# Patient Record
Sex: MALE | Race: WHITE | Employment: FULL TIME | URBAN - METROPOLITAN AREA
[De-identification: names, ages, dates, MRNs, and addresses within clinical notes are randomized per-mention and may not be internally consistent; named-entity substitution may affect disease eponyms.]

---

## 2017-01-05 ENCOUNTER — TELEPHONE (OUTPATIENT)
Dept: FAMILY MEDICINE CLINIC | Facility: CLINIC | Age: 62
End: 2017-01-05

## 2017-01-09 NOTE — TELEPHONE ENCOUNTER
JOSE, please schedule nurse visit for Hep B #2 on or after 1/21/17. Pt will be due for Hep A #2 on or after 6/21/17, when he's due for Hep B #3. Thanks.

## 2017-01-21 ENCOUNTER — NURSE ONLY (OUTPATIENT)
Dept: FAMILY MEDICINE CLINIC | Facility: CLINIC | Age: 62
End: 2017-01-21

## 2017-01-21 DIAGNOSIS — Z23 NEED FOR HEPATITIS B VACCINATION: Primary | ICD-10-CM

## 2017-01-21 PROCEDURE — 90471 IMMUNIZATION ADMIN: CPT | Performed by: FAMILY MEDICINE

## 2017-01-21 PROCEDURE — 90746 HEPB VACCINE 3 DOSE ADULT IM: CPT | Performed by: FAMILY MEDICINE

## 2017-01-21 NOTE — PROGRESS NOTES
Pt is here for Hep B vaccine #2. Injection given, pt tolerated well. Pt advised to return for Hep B #3 and Hep B #2 after 6/21/17. Pt voiced understanding.

## 2017-02-23 ENCOUNTER — TELEPHONE (OUTPATIENT)
Dept: FAMILY MEDICINE CLINIC | Facility: CLINIC | Age: 62
End: 2017-02-23

## 2017-02-23 ENCOUNTER — OFFICE VISIT (OUTPATIENT)
Dept: FAMILY MEDICINE CLINIC | Facility: CLINIC | Age: 62
End: 2017-02-23

## 2017-02-23 VITALS
RESPIRATION RATE: 18 BRPM | TEMPERATURE: 98 F | WEIGHT: 284 LBS | HEART RATE: 76 BPM | SYSTOLIC BLOOD PRESSURE: 145 MMHG | BODY MASS INDEX: 43 KG/M2 | DIASTOLIC BLOOD PRESSURE: 87 MMHG

## 2017-02-23 DIAGNOSIS — I26.99 OTHER PULMONARY EMBOLISM WITHOUT ACUTE COR PULMONALE, UNSPECIFIED CHRONICITY (HCC): Primary | ICD-10-CM

## 2017-02-23 PROCEDURE — 99214 OFFICE O/P EST MOD 30 MIN: CPT | Performed by: FAMILY MEDICINE

## 2017-02-23 PROCEDURE — 99212 OFFICE O/P EST SF 10 MIN: CPT | Performed by: FAMILY MEDICINE

## 2017-02-23 RX ORDER — APIXABAN 5 MG/1
TABLET, FILM COATED ORAL
Refills: 0 | COMMUNITY
Start: 2017-02-15 | End: 2017-08-07

## 2017-02-23 NOTE — H&P
Mihai Norwood is a 64year old male with pmh of DMII, HTN, DID, depressive d/o, ADHD, BPH,who presents for f/u following hospitalization for DVT and small PE. Traveled to [facility] at end of January.  While there right calf selling and redness along with a grad mg by mouth daily. Disp:  Rfl:    Cholecalciferol (VITAMIN D3) 33222 UNITS Oral Cap Take by mouth. Disp:  Rfl:    tamsulosin HCl 0.4 MG Oral Cap Take by mouth daily.  Disp:  Rfl:    hydrochlorothiazide 25 MG Oral Tab Take 1 tablet (25 mg total) by mouth ignacio diarrhea and vomiting  Genitourinary:  Negative for dysuria and hematuria  Hema/Lymph:  Negative for easy bleeding and easy bruising since starting eliquis. Musculoskeletal:  Negative for bone/joint symptoms.  See HPI  Neurological:  Negative for gait dist

## 2017-02-23 NOTE — TELEPHONE ENCOUNTER
Pt was just discharged from [facility] fro right lower extremity DVT and PE. Needs follow-up with pulmonology. Still having some shortness of breath. Can we try to get him an appt in the next week or so?   He was due to have an appt 2 weeks after disc

## 2017-02-24 ENCOUNTER — TELEPHONE (OUTPATIENT)
Dept: PULMONOLOGY | Facility: CLINIC | Age: 62
End: 2017-02-24

## 2017-02-24 NOTE — TELEPHONE ENCOUNTER
Called scheduling, and the first available appt with pulmonology 3/9/17. Request will be forwarded to pulmo clinical staff for earlier appt.

## 2017-02-24 NOTE — TELEPHONE ENCOUNTER
Dr Sally Leyva office called to schedule appointment for this patient within the next week.   Patient was in [facility] with a blood clot and she would like patient seen by a Pulmonologist within the next week -  RN states Dr Yelena Medina sent communication to Mindi

## 2017-02-27 NOTE — TELEPHONE ENCOUNTER
Nai Narayan he was hospitalized @ [facility] for blood clot in R leg & then blood clot in lung found. He stts he hasn't seen any of our pulmonary physicians. Pt sched w/ AR for 3/6 @ 12 pm at [facility]. He was given appt time, location, & parking.  Pt verbal

## 2017-03-01 ENCOUNTER — TELEPHONE (OUTPATIENT)
Dept: FAMILY MEDICINE CLINIC | Facility: CLINIC | Age: 62
End: 2017-03-01

## 2017-03-02 RX ORDER — METOPROLOL SUCCINATE 50 MG/1
50 TABLET, EXTENDED RELEASE ORAL
Qty: 90 TABLET | Refills: 0 | Status: SHIPPED | OUTPATIENT
Start: 2017-03-02 | End: 2017-06-12

## 2017-03-02 NOTE — TELEPHONE ENCOUNTER
Current Outpatient Prescriptions:  Metoprolol Succinate ER 50 MG Oral Tablet 24 Hr Take 1 tablet (50 mg total) by mouth once daily.  Disp: 90 tablet Rfl: PT NEEDS REFILLS

## 2017-03-02 NOTE — TELEPHONE ENCOUNTER
Requesting Metoprolol Succinate ER refill    Hypertensive Medications  Protocol Criteria:  · Appointment scheduled in the past 6 months or in the next 3 months  · BMP or CMP in the past 12 months  · Creatinine result < 2  Recent Visits       Provider Depar

## 2017-03-22 ENCOUNTER — OFFICE VISIT (OUTPATIENT)
Dept: PULMONOLOGY | Facility: CLINIC | Age: 62
End: 2017-03-22

## 2017-03-22 VITALS
WEIGHT: 281 LBS | OXYGEN SATURATION: 98 % | HEIGHT: 68 IN | SYSTOLIC BLOOD PRESSURE: 150 MMHG | RESPIRATION RATE: 18 BRPM | BODY MASS INDEX: 42.59 KG/M2 | HEART RATE: 70 BPM | DIASTOLIC BLOOD PRESSURE: 76 MMHG

## 2017-03-22 DIAGNOSIS — I26.99 OTHER ACUTE PULMONARY EMBOLISM WITHOUT ACUTE COR PULMONALE (HCC): Primary | ICD-10-CM

## 2017-03-22 DIAGNOSIS — E66.01 MORBID OBESITY DUE TO EXCESS CALORIES (HCC): ICD-10-CM

## 2017-03-22 PROCEDURE — 99212 OFFICE O/P EST SF 10 MIN: CPT | Performed by: INTERNAL MEDICINE

## 2017-03-22 PROCEDURE — 99244 OFF/OP CNSLTJ NEW/EST MOD 40: CPT | Performed by: INTERNAL MEDICINE

## 2017-03-22 RX ORDER — OMEPRAZOLE 20 MG/1
20 CAPSULE, DELAYED RELEASE ORAL
COMMUNITY
End: 2018-12-26 | Stop reason: DRUGHIGH

## 2017-03-22 RX ORDER — BUPROPION HYDROCHLORIDE 150 MG/1
TABLET, EXTENDED RELEASE ORAL
Refills: 0 | COMMUNITY
Start: 2017-03-13 | End: 2017-03-22

## 2017-03-22 NOTE — PROGRESS NOTES
[facility], [facility], [facility]    Report of Consultation    María Sauceda Patient Status:  Outpatient    1955 MRN [id number]   Location [address] [place] [address], [address] [place] [address], [facility] Mother    • Hypertension Mother    • Stroke Mother      Per NG CVA   • Clotting Disorder Sister        Social History    Smoking Status: Never Smoker                      Alcohol Use: Yes                Comment: Beer & Wine Occasionally         Current Med tell from the records I have . Plan : At least 6 months and might need one year of anticoagulation and based on hematology evaluation , and  hypercoagulable workup might be need it  for longer time .   Continue anticoagulation with Eliquis  We will r

## 2017-05-05 ENCOUNTER — TELEPHONE (OUTPATIENT)
Dept: FAMILY MEDICINE CLINIC | Facility: CLINIC | Age: 62
End: 2017-05-05

## 2017-05-05 NOTE — TELEPHONE ENCOUNTER
Elva Leslie needs a refill of:    •  MetFORMIN HCl 500 MG Oral Tab, Take 1 tablet (500 mg total) by mouth daily with breakfast., Disp: 90 tablet, Rfl: 1

## 2017-05-05 NOTE — TELEPHONE ENCOUNTER
Requesting Metformin refill    Diabetes Medications  Protocol Criteria:  · Appointment scheduled in the past 6 months or the next 3 months  · A1C < 7.5 in the past 6 months  · Creatinine in the past 12 months  · Creatinine result < 1.5   Recent Visits

## 2017-06-06 RX ORDER — HYDROCHLOROTHIAZIDE 25 MG/1
TABLET ORAL
Qty: 90 TABLET | Refills: 0 | Status: SHIPPED | OUTPATIENT
Start: 2017-06-06 | End: 2017-09-01

## 2017-06-15 RX ORDER — METOPROLOL SUCCINATE 50 MG/1
50 TABLET, EXTENDED RELEASE ORAL
Qty: 90 TABLET | Refills: 0 | Status: SHIPPED | OUTPATIENT
Start: 2017-06-15 | End: 2017-09-09

## 2017-06-15 NOTE — TELEPHONE ENCOUNTER
Chart reviewed. Refills sent per Triage Dept protocol.      Hypertensive Medications  Protocol Criteria:  · Appointment scheduled in the past 6 months or in the next 3 months  · BMP or CMP in the past 12 months  · Creatinine result < 2  Recent Visits

## 2017-08-02 ENCOUNTER — TELEPHONE (OUTPATIENT)
Dept: FAMILY MEDICINE CLINIC | Facility: CLINIC | Age: 62
End: 2017-08-02

## 2017-08-03 ENCOUNTER — TELEPHONE (OUTPATIENT)
Dept: FAMILY MEDICINE CLINIC | Facility: CLINIC | Age: 62
End: 2017-08-03

## 2017-08-04 NOTE — TELEPHONE ENCOUNTER
Pt is scheduled for a diabetic f/u on Monday 08/07. Would Dr. Monse Mendoza like to order any labs prior to this visit?

## 2017-08-07 ENCOUNTER — OFFICE VISIT (OUTPATIENT)
Dept: FAMILY MEDICINE CLINIC | Facility: CLINIC | Age: 62
End: 2017-08-07

## 2017-08-07 VITALS
WEIGHT: 290.19 LBS | TEMPERATURE: 97 F | HEIGHT: 68 IN | OXYGEN SATURATION: 96 % | HEART RATE: 68 BPM | DIASTOLIC BLOOD PRESSURE: 84 MMHG | SYSTOLIC BLOOD PRESSURE: 140 MMHG | BODY MASS INDEX: 43.98 KG/M2

## 2017-08-07 DIAGNOSIS — I82.4Y1 ACUTE DEEP VEIN THROMBOSIS (DVT) OF PROXIMAL VEIN OF RIGHT LOWER EXTREMITY (HCC): ICD-10-CM

## 2017-08-07 DIAGNOSIS — I26.99 OTHER PULMONARY EMBOLISM WITHOUT ACUTE COR PULMONALE, UNSPECIFIED CHRONICITY (HCC): ICD-10-CM

## 2017-08-07 DIAGNOSIS — I10 ESSENTIAL HYPERTENSION: ICD-10-CM

## 2017-08-07 DIAGNOSIS — E11.9 TYPE 2 DIABETES MELLITUS WITHOUT COMPLICATION, WITHOUT LONG-TERM CURRENT USE OF INSULIN (HCC): Primary | ICD-10-CM

## 2017-08-07 PROCEDURE — 99214 OFFICE O/P EST MOD 30 MIN: CPT | Performed by: FAMILY MEDICINE

## 2017-08-07 PROCEDURE — 90471 IMMUNIZATION ADMIN: CPT | Performed by: FAMILY MEDICINE

## 2017-08-07 PROCEDURE — 90632 HEPA VACCINE ADULT IM: CPT | Performed by: FAMILY MEDICINE

## 2017-08-07 PROCEDURE — 90746 HEPB VACCINE 3 DOSE ADULT IM: CPT | Performed by: FAMILY MEDICINE

## 2017-08-07 PROCEDURE — 99212 OFFICE O/P EST SF 10 MIN: CPT | Performed by: FAMILY MEDICINE

## 2017-08-07 PROCEDURE — 90472 IMMUNIZATION ADMIN EACH ADD: CPT | Performed by: FAMILY MEDICINE

## 2017-08-07 RX ORDER — HYDROCHLOROTHIAZIDE 25 MG/1
25 TABLET ORAL
COMMUNITY
End: 2017-08-07

## 2017-08-07 RX ORDER — BUPROPION HYDROCHLORIDE 150 MG/1
150 TABLET, EXTENDED RELEASE ORAL
COMMUNITY
End: 2017-08-07

## 2017-08-07 RX ORDER — ALPRAZOLAM 1 MG/1
1 TABLET ORAL 4 TIMES DAILY
COMMUNITY
End: 2021-07-20

## 2017-08-07 RX ORDER — METOPROLOL SUCCINATE 50 MG/1
50 TABLET, EXTENDED RELEASE ORAL
COMMUNITY
End: 2017-08-07

## 2017-08-07 RX ORDER — TAMSULOSIN HYDROCHLORIDE 0.4 MG/1
0.4 CAPSULE ORAL
COMMUNITY
End: 2017-08-07

## 2017-08-07 RX ORDER — ARMODAFINIL 250 MG/1
250 TABLET ORAL
COMMUNITY
End: 2017-08-07

## 2017-08-07 RX ORDER — FLUOXETINE HYDROCHLORIDE 20 MG/1
20 CAPSULE ORAL
COMMUNITY
End: 2017-08-07

## 2017-08-07 NOTE — PROGRESS NOTES
HPI: Luis Duggan is a 64year old male who presents for diabetes follow-up. Takes Metformin 500mg daily. No side effects. Does not test accuchecks. States he is eating a lot. Last eye exam was 9-12 months ago. Pescatarian. Tries to avoid exercise.   States tamsulosin HCl 0.4 MG Oral Cap Take by mouth daily. Disp:  Rfl:    BuPROPion HCl ER, SR, (WELLBUTRIN SR) 150 MG Oral Tablet 12 Hr  Disp:  Rfl: 0   FLUoxetine HCl (PROZAC) 20 MG Oral Cap  Disp:  Rfl: 0   PSEUDOEPHEDRINE HCL OR Take  by mouth.  take 2 table

## 2017-08-17 ENCOUNTER — HOSPITAL ENCOUNTER (OUTPATIENT)
Dept: ULTRASOUND IMAGING | Facility: HOSPITAL | Age: 62
Discharge: HOME | End: 2017-08-17
Attending: FAMILY MEDICINE
Payer: COMMERCIAL

## 2017-08-17 DIAGNOSIS — I82.4Y1 ACUTE DEEP VEIN THROMBOSIS (DVT) OF PROXIMAL VEIN OF RIGHT LOWER EXTREMITY (HCC): ICD-10-CM

## 2017-08-17 DIAGNOSIS — I26.99 OTHER PULMONARY EMBOLISM WITHOUT ACUTE COR PULMONALE, UNSPECIFIED CHRONICITY (HCC): ICD-10-CM

## 2017-08-17 PROCEDURE — 93971 EXTREMITY STUDY: CPT | Performed by: FAMILY MEDICINE

## 2017-08-25 ENCOUNTER — TELEPHONE (OUTPATIENT)
Dept: FAMILY MEDICINE CLINIC | Facility: CLINIC | Age: 62
End: 2017-08-25

## 2017-08-25 ENCOUNTER — OFFICE VISIT (OUTPATIENT)
Dept: HEMATOLOGY/ONCOLOGY | Facility: HOSPITAL | Age: 62
End: 2017-08-25
Attending: INTERNAL MEDICINE
Payer: COMMERCIAL

## 2017-08-25 ENCOUNTER — TELEPHONE (OUTPATIENT)
Dept: HEMATOLOGY/ONCOLOGY | Facility: HOSPITAL | Age: 62
End: 2017-08-25

## 2017-08-25 VITALS
SYSTOLIC BLOOD PRESSURE: 130 MMHG | TEMPERATURE: 98 F | RESPIRATION RATE: 16 BRPM | DIASTOLIC BLOOD PRESSURE: 73 MMHG | HEART RATE: 73 BPM | WEIGHT: 289 LBS | BODY MASS INDEX: 43.8 KG/M2 | HEIGHT: 68 IN

## 2017-08-25 DIAGNOSIS — I82.4Y1 ACUTE DEEP VEIN THROMBOSIS (DVT) OF PROXIMAL VEIN OF RIGHT LOWER EXTREMITY (HCC): Primary | ICD-10-CM

## 2017-08-25 DIAGNOSIS — E66.9 OBESITY, UNSPECIFIED CLASSIFICATION, UNSPECIFIED OBESITY TYPE, UNSPECIFIED WHETHER SERIOUS COMORBIDITY PRESENT: Primary | ICD-10-CM

## 2017-08-25 DIAGNOSIS — I82.401 DEEP VEIN THROMBOSIS (DVT) OF RIGHT LOWER EXTREMITY, UNSPECIFIED CHRONICITY, UNSPECIFIED VEIN (HCC): ICD-10-CM

## 2017-08-25 DIAGNOSIS — I26.99 OTHER PULMONARY EMBOLISM WITHOUT ACUTE COR PULMONALE, UNSPECIFIED CHRONICITY (HCC): ICD-10-CM

## 2017-08-25 PROCEDURE — 99245 OFF/OP CONSLTJ NEW/EST HI 55: CPT | Performed by: INTERNAL MEDICINE

## 2017-08-25 PROCEDURE — 99212 OFFICE O/P EST SF 10 MIN: CPT | Performed by: INTERNAL MEDICINE

## 2017-08-25 RX ORDER — WARFARIN SODIUM 5 MG/1
5 TABLET ORAL NIGHTLY
Qty: 20 TABLET | Refills: 0 | Status: SHIPPED | OUTPATIENT
Start: 2017-08-25 | End: 2017-09-20 | Stop reason: SDUPTHER

## 2017-08-25 NOTE — TELEPHONE ENCOUNTER
Per Gracie/RN stts that pt need to starts coumadin, so pt needs to start to go and have his PT/INR to do. Pls advise.

## 2017-08-25 NOTE — CONSULTS
[facility]    PATIENT'S NAME: Tucker Mcneillum PHYSICIAN: Jackie Pitts.  Huyen Cole MD   PATIENT ACCOUNT #: [de-identified] LOCATION: [address] [place] [address] RECORD #: [id number] YOB: 1955   CONSULTATION DATE: 08/25/2017       CANCER [facility] thromboembolic disease in the family. REVIEW OF SYSTEMS:  All other systems are reviewed and negative x12. PHYSICAL EXAMINATION:    GENERAL:  No acute distress. Alert and oriented. VITAL SIGNS:  ECOG performance status is 0. Weight is 131 kg. arrange for him to be seen in the Coumadin Clinic. Of note, although he has risk factors with his obesity and sedentary lifestyle, this was likely a provoked episode of venous thromboembolic disease following his travel several weeks before.   If he has

## 2017-08-25 NOTE — TELEPHONE ENCOUNTER
Patient seen today by Dr Olinda Pitts, needs coumadin clinic for coumadin management. I called the coumadin and gave them pt information. They will forward the request to the MD and contact patient.

## 2017-08-28 ENCOUNTER — ANTI-COAG VISIT (OUTPATIENT)
Dept: INTERNAL MEDICINE CLINIC | Facility: CLINIC | Age: 62
End: 2017-08-28

## 2017-08-28 DIAGNOSIS — I82.4Y1 ACUTE DEEP VEIN THROMBOSIS (DVT) OF PROXIMAL VEIN OF RIGHT LOWER EXTREMITY (HCC): ICD-10-CM

## 2017-08-28 NOTE — TELEPHONE ENCOUNTER
S/w pt- is switching from eliquis to warfarin, just started on warfarin yesterday on 8/27. New pt appt made to see at [address] [place] [address] on 8/31. Pt v/u will call Dr. Benson Forrester when to stop on eliquis exactly.

## 2017-08-29 ENCOUNTER — TELEPHONE (OUTPATIENT)
Dept: INTERNAL MEDICINE CLINIC | Facility: CLINIC | Age: 62
End: 2017-08-29

## 2017-08-31 ENCOUNTER — TELEPHONE (OUTPATIENT)
Dept: INTERNAL MEDICINE CLINIC | Facility: CLINIC | Age: 62
End: 2017-08-31

## 2017-08-31 ENCOUNTER — ANTI-COAG VISIT (OUTPATIENT)
Dept: INTERNAL MEDICINE CLINIC | Facility: CLINIC | Age: 62
End: 2017-08-31

## 2017-08-31 DIAGNOSIS — I82.4Y1 ACUTE DEEP VEIN THROMBOSIS (DVT) OF PROXIMAL VEIN OF RIGHT LOWER EXTREMITY (HCC): ICD-10-CM

## 2017-08-31 LAB — INR: 1.9 (ref 2–3)

## 2017-08-31 PROCEDURE — 85610 PROTHROMBIN TIME: CPT

## 2017-08-31 PROCEDURE — 36416 COLLJ CAPILLARY BLOOD SPEC: CPT

## 2017-08-31 NOTE — TELEPHONE ENCOUNTER
I am so sorry for sending this so late in the day did lower pts dose to take warfarin 2.5 mg Mon., Fri., and 5 the other days.   Is coming back next week for INR test.  ashleigh

## 2017-09-01 RX ORDER — HYDROCHLOROTHIAZIDE 25 MG/1
25 TABLET ORAL
Qty: 90 TABLET | Refills: 0 | Status: SHIPPED | OUTPATIENT
Start: 2017-09-01 | End: 2017-12-04

## 2017-09-01 NOTE — TELEPHONE ENCOUNTER
Hypertensive Medications: Refilled per protocol    Protocol Criteria:  · Appointment scheduled in the past 6 months or in the next 3 months  · BMP or CMP in the past 12 months  · Creatinine result < 2  Recent Outpatient Visits            1 week ago Obesity

## 2017-09-05 ENCOUNTER — TELEPHONE (OUTPATIENT)
Dept: HEMATOLOGY/ONCOLOGY | Facility: HOSPITAL | Age: 62
End: 2017-09-05

## 2017-09-05 NOTE — TELEPHONE ENCOUNTER
Sreekanth Estrada called and said the coumadin clinic wanted him to call Dr. Christiano Lee to see if he wants him to continue on the eliquis medication please advise.

## 2017-09-06 ENCOUNTER — ANTI-COAG VISIT (OUTPATIENT)
Dept: INTERNAL MEDICINE CLINIC | Facility: CLINIC | Age: 62
End: 2017-09-06

## 2017-09-06 DIAGNOSIS — I82.4Y1 ACUTE DEEP VEIN THROMBOSIS (DVT) OF PROXIMAL VEIN OF RIGHT LOWER EXTREMITY (HCC): ICD-10-CM

## 2017-09-06 LAB — INR: 2.9 (ref 2–3)

## 2017-09-06 PROCEDURE — 85610 PROTHROMBIN TIME: CPT

## 2017-09-06 PROCEDURE — 36416 COLLJ CAPILLARY BLOOD SPEC: CPT

## 2017-09-06 NOTE — TELEPHONE ENCOUNTER
Hi Dr. Allan Galdamez,    Pt has INR of 2.9 (his goal is 2.0-3.0) today, and he is taking 2.5 mg x2 days, and 5 mg x5 days. Can he stop the eliquis now? Please advise.

## 2017-09-07 ENCOUNTER — TELEPHONE (OUTPATIENT)
Dept: INTERNAL MEDICINE CLINIC | Facility: CLINIC | Age: 62
End: 2017-09-07

## 2017-09-09 RX ORDER — METOPROLOL SUCCINATE 50 MG/1
50 TABLET, EXTENDED RELEASE ORAL
Qty: 90 TABLET | Refills: 0 | Status: SHIPPED | OUTPATIENT
Start: 2017-09-09 | End: 2017-12-05

## 2017-09-09 NOTE — TELEPHONE ENCOUNTER
Chart reviewed. Refills sent per Triage Dept protocol.      Hypertensive Medications  Protocol Criteria:  · Appointment scheduled in the past 6 months or in the next 3 months  · BMP or CMP in the past 12 months  · Creatinine result < 2  Recent Outpatient Vi

## 2017-09-19 RX ORDER — WARFARIN SODIUM 5 MG/1
TABLET ORAL
Qty: 20 TABLET | Refills: 0 | OUTPATIENT
Start: 2017-09-19

## 2017-09-20 ENCOUNTER — TELEPHONE (OUTPATIENT)
Dept: INTERNAL MEDICINE CLINIC | Facility: CLINIC | Age: 62
End: 2017-09-20

## 2017-09-20 ENCOUNTER — ANTI-COAG VISIT (OUTPATIENT)
Dept: INTERNAL MEDICINE CLINIC | Facility: CLINIC | Age: 62
End: 2017-09-20

## 2017-09-20 DIAGNOSIS — I82.4Y1 ACUTE DEEP VEIN THROMBOSIS (DVT) OF PROXIMAL VEIN OF RIGHT LOWER EXTREMITY (HCC): ICD-10-CM

## 2017-09-20 LAB — INR: 1.8 (ref 2–3)

## 2017-09-20 PROCEDURE — 85610 PROTHROMBIN TIME: CPT

## 2017-09-20 PROCEDURE — 36416 COLLJ CAPILLARY BLOOD SPEC: CPT

## 2017-09-20 PROCEDURE — 99211 OFF/OP EST MAY X REQ PHY/QHP: CPT

## 2017-09-20 RX ORDER — WARFARIN SODIUM 5 MG/1
TABLET ORAL
Qty: 30 TABLET | Refills: 3 | Status: SHIPPED | OUTPATIENT
Start: 2017-09-20 | End: 2018-01-10

## 2017-10-05 ENCOUNTER — ANTI-COAG VISIT (OUTPATIENT)
Dept: INTERNAL MEDICINE CLINIC | Facility: CLINIC | Age: 62
End: 2017-10-05

## 2017-10-05 DIAGNOSIS — I82.4Y1 ACUTE DEEP VEIN THROMBOSIS (DVT) OF PROXIMAL VEIN OF RIGHT LOWER EXTREMITY (HCC): ICD-10-CM

## 2017-10-05 PROCEDURE — 36416 COLLJ CAPILLARY BLOOD SPEC: CPT

## 2017-10-05 PROCEDURE — 85610 PROTHROMBIN TIME: CPT

## 2017-10-13 ENCOUNTER — APPOINTMENT (OUTPATIENT)
Dept: LAB | Age: 62
End: 2017-10-13
Attending: FAMILY MEDICINE
Payer: COMMERCIAL

## 2017-10-13 DIAGNOSIS — E11.9 TYPE 2 DIABETES MELLITUS WITHOUT COMPLICATION, WITHOUT LONG-TERM CURRENT USE OF INSULIN (HCC): ICD-10-CM

## 2017-10-13 PROCEDURE — 83036 HEMOGLOBIN GLYCOSYLATED A1C: CPT

## 2017-10-13 PROCEDURE — 36415 COLL VENOUS BLD VENIPUNCTURE: CPT

## 2017-10-28 RX ORDER — ROPINIROLE 0.25 MG/1
0.5 TABLET, FILM COATED ORAL NIGHTLY
Qty: 60 TABLET | Refills: 0 | Status: SHIPPED | OUTPATIENT
Start: 2017-10-28 | End: 2017-11-30

## 2017-10-28 NOTE — TELEPHONE ENCOUNTER
Please advise on refill request.    Refill Protocol Appointment Criteria  · Appointment scheduled in the past 6 months or in the next 3 months  Recent Outpatient Visits            2 months ago Obesity, unspecified classification, unspecified obesity type,

## 2017-11-01 ENCOUNTER — ANTI-COAG VISIT (OUTPATIENT)
Dept: INTERNAL MEDICINE CLINIC | Facility: CLINIC | Age: 62
End: 2017-11-01

## 2017-11-01 DIAGNOSIS — I82.4Y1 ACUTE DEEP VEIN THROMBOSIS (DVT) OF PROXIMAL VEIN OF RIGHT LOWER EXTREMITY (HCC): ICD-10-CM

## 2017-11-01 PROCEDURE — 36416 COLLJ CAPILLARY BLOOD SPEC: CPT

## 2017-11-01 PROCEDURE — 85610 PROTHROMBIN TIME: CPT

## 2017-11-07 NOTE — TELEPHONE ENCOUNTER
Signed Prescriptions Disp Refills    MetFORMIN HCl 500 MG Oral Tab 90 tablet 2      Sig: Take 1 tablet (500 mg total) by mouth daily with breakfast.        Authorizing Provider: Severiano Boga        Ordering User: Hernandez Arturo           Refill nieves Tab Luis Brought, DO]    Preferred pharmacy: [facility] ST[facility] [address], [address] [address], [phone], [phone]

## 2017-11-15 ENCOUNTER — ANTI-COAG VISIT (OUTPATIENT)
Dept: INTERNAL MEDICINE CLINIC | Facility: CLINIC | Age: 62
End: 2017-11-15

## 2017-11-15 DIAGNOSIS — I82.4Y1 ACUTE DEEP VEIN THROMBOSIS (DVT) OF PROXIMAL VEIN OF RIGHT LOWER EXTREMITY (HCC): ICD-10-CM

## 2017-11-15 PROCEDURE — 36416 COLLJ CAPILLARY BLOOD SPEC: CPT

## 2017-11-15 PROCEDURE — 85610 PROTHROMBIN TIME: CPT

## 2017-11-21 ENCOUNTER — OFFICE VISIT (OUTPATIENT)
Dept: HEMATOLOGY/ONCOLOGY | Facility: HOSPITAL | Age: 62
End: 2017-11-21
Attending: INTERNAL MEDICINE
Payer: COMMERCIAL

## 2017-11-21 VITALS
DIASTOLIC BLOOD PRESSURE: 86 MMHG | SYSTOLIC BLOOD PRESSURE: 138 MMHG | HEART RATE: 73 BPM | RESPIRATION RATE: 16 BRPM | WEIGHT: 291 LBS | BODY MASS INDEX: 44.1 KG/M2 | TEMPERATURE: 98 F | HEIGHT: 68 IN

## 2017-11-21 DIAGNOSIS — I26.99 OTHER PULMONARY EMBOLISM WITHOUT ACUTE COR PULMONALE, UNSPECIFIED CHRONICITY (HCC): ICD-10-CM

## 2017-11-21 DIAGNOSIS — I82.401 DEEP VEIN THROMBOSIS (DVT) OF RIGHT LOWER EXTREMITY, UNSPECIFIED CHRONICITY, UNSPECIFIED VEIN (HCC): ICD-10-CM

## 2017-11-21 DIAGNOSIS — E66.9 OBESITY, UNSPECIFIED CLASSIFICATION, UNSPECIFIED OBESITY TYPE, UNSPECIFIED WHETHER SERIOUS COMORBIDITY PRESENT: Primary | ICD-10-CM

## 2017-11-21 PROCEDURE — 99212 OFFICE O/P EST SF 10 MIN: CPT | Performed by: INTERNAL MEDICINE

## 2017-11-21 PROCEDURE — 99214 OFFICE O/P EST MOD 30 MIN: CPT | Performed by: INTERNAL MEDICINE

## 2017-11-22 NOTE — PROGRESS NOTES
Cancer Center Progress Note    Patient Name: Neal Marino   YOB: 1955   Medical Record Number: [id number]   Attending Physician: Paulina Del Cid M.D. Chief Complaint:  Right LE DVT.   PE    History of Present Illness:  26-year-old male with a Hypertension Mother    • Stroke Mother      Per NG CVA   • Clotting Disorder Sister        Social History:    Social History  Social History   Marital status: Single  Spouse name: N/A    Years of education: N/A  Number of children: N/A     Occupational His route  every 6 hours as needed, Disp: , Rfl:   •  Multiple Vitamins-Minerals (CENTRUM) Oral Tab, Take  by mouth.  take 1 tablet by oral route  every day with food, Disp: , Rfl:     Allergies:  No Known Allergies     Review of Systems:  All other systems rev February 2017. His event occurred after a flight to [facility] several weeks earlier. The patient was started on treatment at [facility] with apixaban 5 mg twice daily.   There was still evidence of residual thrombus on ultrasound 8/2017

## 2017-11-29 ENCOUNTER — TELEPHONE (OUTPATIENT)
Dept: FAMILY MEDICINE CLINIC | Facility: CLINIC | Age: 62
End: 2017-11-29

## 2017-11-29 NOTE — TELEPHONE ENCOUNTER
Hugo Armendariz needs a refill of:    •  ROPINIRole HCl (REQUIP) 0.25 MG Oral Tab, Take 2 tablets (0.5 mg total) by mouth nightly., Disp: 60 tablet, Rfl: 0

## 2017-11-30 RX ORDER — ROPINIROLE 0.25 MG/1
0.5 TABLET, FILM COATED ORAL NIGHTLY
Qty: 60 TABLET | Refills: 2 | Status: SHIPPED | OUTPATIENT
Start: 2017-11-30 | End: 2018-02-20

## 2017-11-30 NOTE — TELEPHONE ENCOUNTER
ROPINIROLE Medication refilled for 90 days per protocol.     Refill Protocol Appointment Criteria  · Appointment scheduled in the past 6 months or in the next 3 months  Recent Outpatient Visits            1 week ago Obesity, unspecified classification, unsp

## 2017-12-04 RX ORDER — HYDROCHLOROTHIAZIDE 25 MG/1
TABLET ORAL
Qty: 90 TABLET | Refills: 0 | Status: SHIPPED | OUTPATIENT
Start: 2017-12-04 | End: 2018-02-23

## 2017-12-04 NOTE — TELEPHONE ENCOUNTER
Chart reviewed, stable with CPM written by Dr Ana Laura Preston. HCTZ 25mg one tablet daily by mouth refilled for 90 days per [facility] refill protocol.     Hypertensive Medications  Protocol Criteria:  · Appointment scheduled in the past 6 months or in the next 3 months  · B 11/09/2016   GLOBULIN 3.0 11/09/2016   AGRATIO 1.4 08/20/2015   ANIONGAP 10 11/09/2016   OSMOCALC 286 11/09/2016

## 2017-12-05 RX ORDER — METOPROLOL SUCCINATE 50 MG/1
50 TABLET, EXTENDED RELEASE ORAL
Qty: 90 TABLET | Refills: 0 | Status: SHIPPED | OUTPATIENT
Start: 2017-12-05 | End: 2018-02-23

## 2017-12-05 NOTE — TELEPHONE ENCOUNTER
Hugo Armendariz needs a refill of:    •  Metoprolol Succinate ER 50 MG Oral Tablet 24 Hr, Take 1 tablet (50 mg total) by mouth once daily. , Disp: 90 tablet, Rfl: 0

## 2017-12-05 NOTE — TELEPHONE ENCOUNTER
Protocol failed, please advise on prescription request.  Labs over a year old.       Hypertensive Medications  Protocol Criteria:  · Appointment scheduled in the past 6 months or in the next 3 months  · BMP or CMP in the past 12 months  · Creatinine result 08/20/2015   ANIONGAP 10 11/09/2016   OSMOCALC 286 11/09/2016

## 2017-12-13 ENCOUNTER — APPOINTMENT (OUTPATIENT)
Dept: LAB | Age: 62
End: 2017-12-13
Attending: INTERNAL MEDICINE
Payer: COMMERCIAL

## 2017-12-13 ENCOUNTER — TELEPHONE (OUTPATIENT)
Dept: INTERNAL MEDICINE CLINIC | Facility: CLINIC | Age: 62
End: 2017-12-13

## 2017-12-13 ENCOUNTER — ANTI-COAG VISIT (OUTPATIENT)
Dept: INTERNAL MEDICINE CLINIC | Facility: CLINIC | Age: 62
End: 2017-12-13

## 2017-12-13 DIAGNOSIS — I82.4Y1 ACUTE DEEP VEIN THROMBOSIS (DVT) OF PROXIMAL VEIN OF RIGHT LOWER EXTREMITY (HCC): ICD-10-CM

## 2017-12-13 DIAGNOSIS — I82.401 DEEP VEIN THROMBOSIS (DVT) OF RIGHT LOWER EXTREMITY, UNSPECIFIED CHRONICITY, UNSPECIFIED VEIN (HCC): ICD-10-CM

## 2017-12-13 PROCEDURE — 99211 OFF/OP EST MAY X REQ PHY/QHP: CPT

## 2017-12-13 PROCEDURE — 36416 COLLJ CAPILLARY BLOOD SPEC: CPT

## 2017-12-13 PROCEDURE — 85610 PROTHROMBIN TIME: CPT

## 2017-12-13 PROCEDURE — 36415 COLL VENOUS BLD VENIPUNCTURE: CPT

## 2017-12-13 PROCEDURE — 85379 FIBRIN DEGRADATION QUANT: CPT

## 2017-12-27 ENCOUNTER — ANTI-COAG VISIT (OUTPATIENT)
Dept: INTERNAL MEDICINE CLINIC | Facility: CLINIC | Age: 62
End: 2017-12-27

## 2017-12-27 DIAGNOSIS — I82.4Y1 ACUTE DEEP VEIN THROMBOSIS (DVT) OF PROXIMAL VEIN OF RIGHT LOWER EXTREMITY (HCC): ICD-10-CM

## 2017-12-27 PROCEDURE — 85610 PROTHROMBIN TIME: CPT

## 2017-12-27 PROCEDURE — 99211 OFF/OP EST MAY X REQ PHY/QHP: CPT

## 2017-12-27 PROCEDURE — 36416 COLLJ CAPILLARY BLOOD SPEC: CPT

## 2018-01-04 ENCOUNTER — ANTI-COAG VISIT (OUTPATIENT)
Dept: INTERNAL MEDICINE CLINIC | Facility: CLINIC | Age: 63
End: 2018-01-04

## 2018-01-04 DIAGNOSIS — I82.4Y1 ACUTE DEEP VEIN THROMBOSIS (DVT) OF PROXIMAL VEIN OF RIGHT LOWER EXTREMITY (HCC): ICD-10-CM

## 2018-01-04 LAB — INR: 2.4 (ref 2–3)

## 2018-01-04 PROCEDURE — 85610 PROTHROMBIN TIME: CPT

## 2018-01-04 PROCEDURE — 36416 COLLJ CAPILLARY BLOOD SPEC: CPT

## 2018-01-10 RX ORDER — WARFARIN SODIUM 5 MG/1
TABLET ORAL
Qty: 30 TABLET | Refills: 0 | Status: SHIPPED | OUTPATIENT
Start: 2018-01-10 | End: 2018-01-10 | Stop reason: ALTCHOICE

## 2018-01-10 NOTE — TELEPHONE ENCOUNTER
LOV: 8/7/17  Last Rx:9/20/17    No protocol     Please advise in regards to refill request. Thank You

## 2018-01-18 ENCOUNTER — OFFICE VISIT (OUTPATIENT)
Dept: HEMATOLOGY/ONCOLOGY | Facility: HOSPITAL | Age: 63
End: 2018-01-18
Attending: INTERNAL MEDICINE
Payer: COMMERCIAL

## 2018-01-18 VITALS
HEART RATE: 74 BPM | HEIGHT: 68 IN | TEMPERATURE: 99 F | BODY MASS INDEX: 44.25 KG/M2 | WEIGHT: 292 LBS | DIASTOLIC BLOOD PRESSURE: 79 MMHG | SYSTOLIC BLOOD PRESSURE: 127 MMHG | RESPIRATION RATE: 18 BRPM

## 2018-01-18 DIAGNOSIS — I82.401 DEEP VEIN THROMBOSIS (DVT) OF RIGHT LOWER EXTREMITY, UNSPECIFIED CHRONICITY, UNSPECIFIED VEIN (HCC): ICD-10-CM

## 2018-01-18 DIAGNOSIS — E66.9 OBESITY, UNSPECIFIED CLASSIFICATION, UNSPECIFIED OBESITY TYPE, UNSPECIFIED WHETHER SERIOUS COMORBIDITY PRESENT: Primary | ICD-10-CM

## 2018-01-18 DIAGNOSIS — I26.99 OTHER PULMONARY EMBOLISM WITHOUT ACUTE COR PULMONALE, UNSPECIFIED CHRONICITY (HCC): ICD-10-CM

## 2018-01-18 PROCEDURE — 99214 OFFICE O/P EST MOD 30 MIN: CPT | Performed by: INTERNAL MEDICINE

## 2018-01-18 PROCEDURE — 99212 OFFICE O/P EST SF 10 MIN: CPT | Performed by: INTERNAL MEDICINE

## 2018-01-18 RX ORDER — WARFARIN SODIUM 5 MG/1
TABLET ORAL
Refills: 3 | COMMUNITY
Start: 2017-12-04 | End: 2018-04-26

## 2018-01-18 NOTE — PROGRESS NOTES
Cancer Center Progress Note    Patient Name: Jasmin Espinosa   YOB: 1955   Medical Record Number: [id number]   Attending Physician: Abel Rico M.D. Chief Complaint:  Right LE DVT.   PE    History of Present Illness:  17-year-old male with a Hypertension Mother    • Stroke Mother      Per NG CVA   • Clotting Disorder Sister        Social History:    Social History  Social History   Marital status: Single  Spouse name: N/A    Years of education: N/A  Number of children: N/A     Occupational His day with food, Disp: , Rfl:     Allergies:  No Known Allergies     Review of Systems:  All other systems reviewed and negative x12    Vital Signs: There were no vitals taken for this visit.     Physical Examination:  General: Patient is alert and oriented patient's BMI of 43.9, he is likely not on a therapeutic dose of anticoagulation and he was switched to warfarin in August 2017  –Doing well clinically. D-dimer negative.   He has now completed what we consider a definitive course of anticoagulation for pr

## 2018-02-21 RX ORDER — ROPINIROLE 0.25 MG/1
TABLET, FILM COATED ORAL
Qty: 60 TABLET | Refills: 0 | Status: SHIPPED | OUTPATIENT
Start: 2018-02-21 | End: 2018-03-21

## 2018-02-21 NOTE — TELEPHONE ENCOUNTER
Refill Protocol Appointment Criteria. PROTOCOL FAILED. PLEASE ADVISE ON REFILL. THANKS.     · Appointment scheduled in the past 6 months or in the next 3 months  Recent Outpatient Visits            1 month ago Obesity, unspecified classification, unspecifie

## 2018-02-26 RX ORDER — METOPROLOL SUCCINATE 50 MG/1
TABLET, EXTENDED RELEASE ORAL
Qty: 90 TABLET | Refills: 0 | Status: SHIPPED | OUTPATIENT
Start: 2018-02-26 | End: 2018-04-20

## 2018-02-26 RX ORDER — HYDROCHLOROTHIAZIDE 25 MG/1
TABLET ORAL
Qty: 90 TABLET | Refills: 0 | Status: SHIPPED | OUTPATIENT
Start: 2018-02-26 | End: 2018-04-20

## 2018-02-26 NOTE — TELEPHONE ENCOUNTER
Refill protocol failed because the patient did not meet the protocol criteria.  Please advise in regards to refill request     Hypertensive Medications  Protocol Criteria:  · Appointment scheduled in the past 6 months or in the next 3 months  · BMP or CMP i ANIONGAP 10 11/09/2016   Ruth 496 286 11/09/2016

## 2018-03-16 ENCOUNTER — TELEPHONE (OUTPATIENT)
Dept: FAMILY MEDICINE CLINIC | Facility: CLINIC | Age: 63
End: 2018-03-16

## 2018-03-16 DIAGNOSIS — Z00.00 ROUTINE PHYSICAL EXAMINATION: Primary | ICD-10-CM

## 2018-03-16 DIAGNOSIS — E11.9 CONTROLLED TYPE 2 DIABETES MELLITUS WITHOUT COMPLICATION, WITHOUT LONG-TERM CURRENT USE OF INSULIN (HCC): ICD-10-CM

## 2018-03-16 NOTE — TELEPHONE ENCOUNTER
Pt calling to request order for blood work for his physical appt that is scheduled April 26, 2018. Olivia Membreno please advise

## 2018-03-22 ENCOUNTER — PRIOR ORIGINAL RECORDS (OUTPATIENT)
Dept: OTHER | Age: 63
End: 2018-03-22

## 2018-03-22 ENCOUNTER — LAB ENCOUNTER (OUTPATIENT)
Dept: LAB | Age: 63
End: 2018-03-22
Attending: FAMILY MEDICINE
Payer: COMMERCIAL

## 2018-03-22 DIAGNOSIS — E11.9 CONTROLLED TYPE 2 DIABETES MELLITUS WITHOUT COMPLICATION, WITHOUT LONG-TERM CURRENT USE OF INSULIN (HCC): ICD-10-CM

## 2018-03-22 DIAGNOSIS — Z00.00 ROUTINE PHYSICAL EXAMINATION: ICD-10-CM

## 2018-03-22 LAB
ALBUMIN SERPL BCP-MCNC: 4.1 G/DL (ref 3.5–4.8)
ALBUMIN/GLOB SERPL: 1.3 {RATIO} (ref 1–2)
ALP SERPL-CCNC: 56 U/L (ref 32–100)
ALT SERPL-CCNC: 82 U/L (ref 17–63)
ANION GAP SERPL CALC-SCNC: 6 MMOL/L (ref 0–18)
AST SERPL-CCNC: 67 U/L (ref 15–41)
BASOPHILS # BLD: 0 K/UL (ref 0–0.2)
BASOPHILS NFR BLD: 1 %
BILIRUB SERPL-MCNC: 1 MG/DL (ref 0.3–1.2)
BUN SERPL-MCNC: 12 MG/DL (ref 8–20)
BUN/CREAT SERPL: 11 (ref 10–20)
CALCIUM SERPL-MCNC: 9.3 MG/DL (ref 8.5–10.5)
CHLORIDE SERPL-SCNC: 98 MMOL/L (ref 95–110)
CHOLEST SERPL-MCNC: 167 MG/DL (ref 110–200)
CO2 SERPL-SCNC: 33 MMOL/L (ref 22–32)
CREAT SERPL-MCNC: 1.09 MG/DL (ref 0.5–1.5)
CREAT UR-MCNC: 286 MG/DL
EOSINOPHIL # BLD: 0.2 K/UL (ref 0–0.7)
EOSINOPHIL NFR BLD: 4 %
ERYTHROCYTE [DISTWIDTH] IN BLOOD BY AUTOMATED COUNT: 13.9 % (ref 11–15)
GLOBULIN PLAS-MCNC: 3.1 G/DL (ref 2.5–3.7)
GLUCOSE SERPL-MCNC: 132 MG/DL (ref 70–99)
HBA1C MFR BLD: 6.4 % (ref 4–6)
HCT VFR BLD AUTO: 47.1 % (ref 41–52)
HDLC SERPL-MCNC: 34 MG/DL
HGB BLD-MCNC: 16 G/DL (ref 13.5–17.5)
LDLC SERPL CALC-MCNC: 89 MG/DL (ref 0–99)
LYMPHOCYTES # BLD: 1.3 K/UL (ref 1–4)
LYMPHOCYTES NFR BLD: 21 %
MCH RBC QN AUTO: 31.4 PG (ref 27–32)
MCHC RBC AUTO-ENTMCNC: 34 G/DL (ref 32–37)
MCV RBC AUTO: 92.2 FL (ref 80–100)
MICROALBUMIN UR-MCNC: 0.5 MG/DL (ref 0–1.8)
MICROALBUMIN/CREAT UR: 1.7 MG/G{CREAT} (ref 0–20)
MONOCYTES # BLD: 0.8 K/UL (ref 0–1)
MONOCYTES NFR BLD: 13 %
NEUTROPHILS # BLD AUTO: 3.7 K/UL (ref 1.8–7.7)
NEUTROPHILS NFR BLD: 61 %
NONHDLC SERPL-MCNC: 133 MG/DL
OSMOLALITY UR CALC.SUM OF ELEC: 286 MOSM/KG (ref 275–295)
PATIENT FASTING: YES
PLATELET # BLD AUTO: 132 K/UL (ref 140–400)
PMV BLD AUTO: 8.6 FL (ref 7.4–10.3)
POTASSIUM SERPL-SCNC: 3.3 MMOL/L (ref 3.3–5.1)
PROT SERPL-MCNC: 7.2 G/DL (ref 5.9–8.4)
RBC # BLD AUTO: 5.1 M/UL (ref 4.5–5.9)
SODIUM SERPL-SCNC: 137 MMOL/L (ref 136–144)
TRIGL SERPL-MCNC: 221 MG/DL (ref 1–149)
TSH SERPL-ACNC: 4.28 UIU/ML (ref 0.45–5.33)
WBC # BLD AUTO: 6 K/UL (ref 4–11)

## 2018-03-22 PROCEDURE — 82043 UR ALBUMIN QUANTITATIVE: CPT

## 2018-03-22 PROCEDURE — 82306 VITAMIN D 25 HYDROXY: CPT

## 2018-03-22 PROCEDURE — 36415 COLL VENOUS BLD VENIPUNCTURE: CPT

## 2018-03-22 PROCEDURE — 80050 GENERAL HEALTH PANEL: CPT

## 2018-03-22 PROCEDURE — 82570 ASSAY OF URINE CREATININE: CPT

## 2018-03-22 PROCEDURE — 80061 LIPID PANEL: CPT

## 2018-03-22 PROCEDURE — 83036 HEMOGLOBIN GLYCOSYLATED A1C: CPT

## 2018-03-22 PROCEDURE — 84443 ASSAY THYROID STIM HORMONE: CPT

## 2018-03-22 PROCEDURE — 85025 COMPLETE CBC W/AUTO DIFF WBC: CPT

## 2018-03-22 NOTE — TELEPHONE ENCOUNTER
Refill Protocol Appointment Criteria  · Appointment scheduled in the past 6 months or in the next 3 months  Recent Outpatient Visits            2 months ago Obesity, unspecified classification, unspecified obesity type, unspecified whether serious comorbid

## 2018-03-23 LAB — 25(OH)D3 SERPL-MCNC: 27.1 NG/ML

## 2018-03-23 RX ORDER — ROPINIROLE 0.25 MG/1
TABLET, FILM COATED ORAL
Qty: 60 TABLET | Refills: 0 | Status: SHIPPED | OUTPATIENT
Start: 2018-03-23 | End: 2018-04-19

## 2018-04-21 RX ORDER — ROPINIROLE 0.25 MG/1
TABLET, FILM COATED ORAL
Qty: 60 TABLET | Refills: 0 | Status: SHIPPED | OUTPATIENT
Start: 2018-04-21 | End: 2018-05-17

## 2018-04-21 NOTE — TELEPHONE ENCOUNTER
Please advise in regards to refill request. Thank You      Refill Protocol Appointment Criteria  · Appointment scheduled in the past 6 months or in the next 3 months  Recent Outpatient Visits            3 months ago Obesity, unspecified classification, uns

## 2018-04-22 RX ORDER — METOPROLOL SUCCINATE 50 MG/1
TABLET, EXTENDED RELEASE ORAL
Qty: 90 TABLET | Refills: 0 | Status: SHIPPED | OUTPATIENT
Start: 2018-04-22 | End: 2018-09-28

## 2018-04-22 RX ORDER — HYDROCHLOROTHIAZIDE 25 MG/1
TABLET ORAL
Qty: 90 TABLET | Refills: 0 | Status: SHIPPED | OUTPATIENT
Start: 2018-04-22 | End: 2018-10-23

## 2018-04-22 NOTE — TELEPHONE ENCOUNTER
Hypertensive Medications  Protocol Criteria:  · Appointment scheduled in the past 6 months or in the next 3 months  · BMP or CMP in the past 12 months  · Creatinine result < 2  Recent Outpatient Visits            3 months ago Obesity, unspecified classific 03/22/2018     Refilled per protocol

## 2018-04-26 ENCOUNTER — OFFICE VISIT (OUTPATIENT)
Dept: FAMILY MEDICINE CLINIC | Facility: CLINIC | Age: 63
End: 2018-04-26

## 2018-04-26 VITALS
HEART RATE: 74 BPM | HEIGHT: 68 IN | SYSTOLIC BLOOD PRESSURE: 145 MMHG | BODY MASS INDEX: 44.1 KG/M2 | WEIGHT: 291 LBS | TEMPERATURE: 98 F | DIASTOLIC BLOOD PRESSURE: 91 MMHG | RESPIRATION RATE: 16 BRPM

## 2018-04-26 DIAGNOSIS — R79.89 ELEVATED LFTS: ICD-10-CM

## 2018-04-26 DIAGNOSIS — I10 ESSENTIAL HYPERTENSION: ICD-10-CM

## 2018-04-26 DIAGNOSIS — Z00.00 ROUTINE PHYSICAL EXAMINATION: Primary | ICD-10-CM

## 2018-04-26 DIAGNOSIS — L08.9 SKIN INFECTION: ICD-10-CM

## 2018-04-26 DIAGNOSIS — R79.89 ELEVATED TSH: ICD-10-CM

## 2018-04-26 DIAGNOSIS — Z12.11 SCREENING FOR COLON CANCER: ICD-10-CM

## 2018-04-26 DIAGNOSIS — E11.9 CONTROLLED TYPE 2 DIABETES MELLITUS WITHOUT COMPLICATION, WITHOUT LONG-TERM CURRENT USE OF INSULIN (HCC): ICD-10-CM

## 2018-04-26 DIAGNOSIS — E66.01 CLASS 3 SEVERE OBESITY DUE TO EXCESS CALORIES WITH SERIOUS COMORBIDITY AND BODY MASS INDEX (BMI) OF 40.0 TO 44.9 IN ADULT (HCC): ICD-10-CM

## 2018-04-26 PROCEDURE — 90670 PCV13 VACCINE IM: CPT | Performed by: FAMILY MEDICINE

## 2018-04-26 PROCEDURE — 99396 PREV VISIT EST AGE 40-64: CPT | Performed by: FAMILY MEDICINE

## 2018-04-26 PROCEDURE — 90471 IMMUNIZATION ADMIN: CPT | Performed by: FAMILY MEDICINE

## 2018-04-26 RX ORDER — SULFAMETHOXAZOLE AND TRIMETHOPRIM 800; 160 MG/1; MG/1
1 TABLET ORAL 2 TIMES DAILY
Qty: 14 TABLET | Refills: 0 | Status: SHIPPED | OUTPATIENT
Start: 2018-04-26 | End: 2018-05-03

## 2018-04-26 RX ORDER — FLUTICASONE PROPIONATE 50 MCG
SPRAY, SUSPENSION (ML) NASAL
COMMUNITY
Start: 2018-04-01 | End: 2019-01-16

## 2018-04-26 NOTE — PROGRESS NOTES
HPI;  58 yr old male who presents for physical. Taking yoga class once per week. No other exercise. States he is still eating the same. Has very limited options due to girlfriend's eating restrictions. Considering lap band.  Eats mostly vegetarian with some landmarks noted. Nares patent. Oral mucous membrane moist.  Normal lips, teeth, and gums. Oropharynx normal.  Neck supple. Good ROM. No LAD.   Thyroid normal.  CV:  Regular rate and rhythm; no murmurs  Lungs:  Clear to ausculation; good aeration

## 2018-05-18 RX ORDER — ROPINIROLE 0.25 MG/1
TABLET, FILM COATED ORAL
Qty: 60 TABLET | Refills: 0 | Status: SHIPPED | OUTPATIENT
Start: 2018-05-18 | End: 2018-06-28

## 2018-05-18 RX ORDER — ROPINIROLE 0.25 MG/1
TABLET, FILM COATED ORAL
Qty: 60 TABLET | Refills: 0 | OUTPATIENT
Start: 2018-05-18

## 2018-05-18 NOTE — TELEPHONE ENCOUNTER
Refill Protocol Appointment Criteria  · Appointment scheduled in the past 6 months or in the next 3 months  Recent Outpatient Visits            3 weeks ago Routine physical examination    [facility], [facility], [address], [place], [address]

## 2018-06-28 ENCOUNTER — TELEPHONE (OUTPATIENT)
Dept: SURGERY | Facility: CLINIC | Age: 63
End: 2018-06-28

## 2018-06-28 ENCOUNTER — OFFICE VISIT (OUTPATIENT)
Dept: SURGERY | Facility: CLINIC | Age: 63
End: 2018-06-28

## 2018-06-28 VITALS
SYSTOLIC BLOOD PRESSURE: 150 MMHG | RESPIRATION RATE: 16 BRPM | DIASTOLIC BLOOD PRESSURE: 93 MMHG | BODY MASS INDEX: 44.14 KG/M2 | HEIGHT: 67.7 IN | HEART RATE: 93 BPM | WEIGHT: 287.88 LBS

## 2018-06-28 DIAGNOSIS — E66.01 MORBID OBESITY DUE TO EXCESS CALORIES (HCC): Primary | ICD-10-CM

## 2018-06-28 RX ORDER — ROPINIROLE 0.25 MG/1
TABLET, FILM COATED ORAL
Qty: 60 TABLET | Refills: 0 | Status: SHIPPED | OUTPATIENT
Start: 2018-06-28 | End: 2018-08-01

## 2018-06-28 NOTE — H&P
[address], [address]  Dept: [phone]    6/28/2018  Bariatric New Patient Evaluation    PCP: Dr. Monse Mendoza    Chief Complaint:  Initial evaluation for baria issues with balancing the change after baraitric surgery with his relationship with his GF.     ROS:    Gen: No Fevers, chills, recent weight changes, night sweats  Pulm: No shortness of breath, coughing, sore throats  Cardiac: No chest pain, palpitaitons Rfl: 0  •  HYDROCHLOROTHIAZIDE 25 MG Oral Tab, TAKE 1 TABLET BY MOUTH EVERY DAY, Disp: 90 tablet, Rfl: 0  •  MetFORMIN HCl 500 MG Oral Tab, Take 1 tablet (500 mg total) by mouth daily with breakfast., Disp: 90 tablet, Rfl: 2  •  ALPRAZolam 1 MG Oral Tab, T various different bariatric surgical operations including the adjustable gastric band, Andres-en-Y gastric bypass, sleeve gastrectomy, and duodenal switch, but focused mainly on the sleeve gastrectomy and the andres-en-y gastric bypass as the two main options. be special considerations given his history, along with his relationship with his girlfriend. THat being said, I think this will be very manageable.     Gastroenterology - referred to Dr. Sorin carrillo, for preoperative evaluation - eval diarrhea as well as

## 2018-07-05 ENCOUNTER — TELEPHONE (OUTPATIENT)
Dept: SURGERY | Facility: CLINIC | Age: 63
End: 2018-07-05

## 2018-07-05 NOTE — TELEPHONE ENCOUNTER
5/23/18 @ 10:33am Spoke to [employer]. at [facility], [phone], [facility]#[phone]. She verified that patient has following benefits for Bariatric services:   • No weight management criteria. • No LYNDSEY/Blue Distinction required. • ADELITA (IGZ#[id number]) DX E66.

## 2018-07-11 NOTE — TELEPHONE ENCOUNTER
Diabetes Medications  Protocol Criteria:  · Appointment scheduled in the past 6 months or the next 3 months  · A1C < 7.5 in the past 6 months  · Creatinine in the past 12 months  · Creatinine result < 1.5   Recent Outpatient Visits            1 week ago Mo Lab Results  Component Value Date   A1C 6.4 (H) 03/22/2018       Lab Results  Component Value Date   CREATSERUM 1.09 03/22/2018     Refilled per protocol

## 2018-07-16 ENCOUNTER — OFFICE VISIT (OUTPATIENT)
Dept: SURGERY | Facility: CLINIC | Age: 63
End: 2018-07-16

## 2018-07-16 VITALS
OXYGEN SATURATION: 95 % | WEIGHT: 289 LBS | SYSTOLIC BLOOD PRESSURE: 131 MMHG | HEART RATE: 64 BPM | RESPIRATION RATE: 16 BRPM | HEIGHT: 67.7 IN | BODY MASS INDEX: 44.31 KG/M2 | DIASTOLIC BLOOD PRESSURE: 87 MMHG

## 2018-07-16 DIAGNOSIS — E66.01 MORBID OBESITY WITH BMI OF 40.0-44.9, ADULT (HCC): Primary | ICD-10-CM

## 2018-07-16 DIAGNOSIS — F41.9 ANXIETY: ICD-10-CM

## 2018-07-16 DIAGNOSIS — I82.4Y1 ACUTE DEEP VEIN THROMBOSIS (DVT) OF PROXIMAL VEIN OF RIGHT LOWER EXTREMITY (HCC): ICD-10-CM

## 2018-07-16 DIAGNOSIS — G47.33 OSA (OBSTRUCTIVE SLEEP APNEA): ICD-10-CM

## 2018-07-16 DIAGNOSIS — E11.9 TYPE 2 DIABETES MELLITUS WITHOUT COMPLICATION, WITHOUT LONG-TERM CURRENT USE OF INSULIN (HCC): ICD-10-CM

## 2018-07-16 DIAGNOSIS — I10 ESSENTIAL HYPERTENSION: ICD-10-CM

## 2018-07-16 PROCEDURE — 99203 OFFICE O/P NEW LOW 30 MIN: CPT | Performed by: NURSE PRACTITIONER

## 2018-07-16 NOTE — PROGRESS NOTES
[facility] Consultation Note       Date of Consult:  2018    Patient:  Pushpa Michaels  :      1955  MRN:      [id number]    Referring Provider: Dr. Rader ref.  provider found    Chief Complaint:  Patient presents with:  Consult Tablet 24 Hr TAKE 1 TABLET(50 MG) BY MOUTH EVERY DAY Disp: 90 tablet Rfl: 0   HYDROCHLOROTHIAZIDE 25 MG Oral Tab TAKE 1 TABLET BY MOUTH EVERY DAY Disp: 90 tablet Rfl: 0   ALPRAZolam 1 MG Oral Tab Take 1 mg by mouth 4 (four) times daily.    Disp:  Rfl:    om bowls    Coffee with cream Leftovers    [place]    Cheese and apples Boca cheeseburger, fries    Pasta with chicken carmen with cheese    [place] or [place] with veggies    Cheese pizza    Chinese food Does not snack    Does have something sweet after dinner W person, place, and time. He appears well-developed and well-nourished. No distress. HENT:   Head: Normocephalic and atraumatic. Neck: Neck supple. Pulmonary/Chest: Effort normal. No respiratory distress. Abdominal: Soft.    Musculoskeletal: Normal r minutes exercise per week)     Advised Patient not to skip meals    Eat 3 balanced meals per day    Provided Patient a list of healthier snacks to keep on hand to avoid snacking/binging on foods high in carbs/fat     Increase water intake to 48-64oz water/

## 2018-07-19 ENCOUNTER — OFFICE VISIT (OUTPATIENT)
Dept: HEMATOLOGY/ONCOLOGY | Facility: HOSPITAL | Age: 63
End: 2018-07-19
Attending: INTERNAL MEDICINE
Payer: COMMERCIAL

## 2018-07-19 VITALS
HEART RATE: 75 BPM | SYSTOLIC BLOOD PRESSURE: 131 MMHG | TEMPERATURE: 98 F | RESPIRATION RATE: 16 BRPM | DIASTOLIC BLOOD PRESSURE: 76 MMHG | HEIGHT: 68 IN | WEIGHT: 283 LBS | BODY MASS INDEX: 42.89 KG/M2

## 2018-07-19 DIAGNOSIS — I82.401 DEEP VEIN THROMBOSIS (DVT) OF RIGHT LOWER EXTREMITY, UNSPECIFIED CHRONICITY, UNSPECIFIED VEIN (HCC): ICD-10-CM

## 2018-07-19 DIAGNOSIS — E66.9 OBESITY, UNSPECIFIED CLASSIFICATION, UNSPECIFIED OBESITY TYPE, UNSPECIFIED WHETHER SERIOUS COMORBIDITY PRESENT: Primary | ICD-10-CM

## 2018-07-19 DIAGNOSIS — I26.99 OTHER PULMONARY EMBOLISM WITHOUT ACUTE COR PULMONALE, UNSPECIFIED CHRONICITY (HCC): ICD-10-CM

## 2018-07-19 PROCEDURE — 99214 OFFICE O/P EST MOD 30 MIN: CPT | Performed by: INTERNAL MEDICINE

## 2018-07-19 NOTE — PROGRESS NOTES
Cancer Center Progress Note    Patient Name: Romi Mcmahan   YOB: 1955   Medical Record Number: [id number]   Attending Physician: Erwin Mary M.D. Chief Complaint:  Right LE DVT.   PE    History of Present Illness:  79-year-old male with a Comment: as child for croup    Family History:  Family History   Problem Relation Age of Onset   • Diabetes Mother    • Hypertension Mother    • Stroke Mother      Per NG CVA   • Clotting Disorder Sister        Social History:    Social History  Social His Allergies:  No Known Allergies     Review of Systems:  All other systems reviewed and negative x12    Vital Signs:  /76 (BP Location: Left arm, Patient Position: Sitting, Cuff Size: large)   Pulse 75   Temp 98.4 °F (36.9 °C) (Oral)   Resp 16   Ht at [facility] with apixaban 5 mg twice daily. There was still evidence of residual thrombus on ultrasound 8/2017.  Based on the patient's BMI of 43.9, he is likely not on a therapeutic dose of anticoagulation and he was switched to warf

## 2018-07-21 ENCOUNTER — LAB ENCOUNTER (OUTPATIENT)
Dept: LAB | Facility: HOSPITAL | Age: 63
End: 2018-07-21
Attending: SURGERY
Payer: COMMERCIAL

## 2018-07-21 ENCOUNTER — HOSPITAL ENCOUNTER (OUTPATIENT)
Dept: ULTRASOUND IMAGING | Facility: HOSPITAL | Age: 63
Discharge: HOME | End: 2018-07-21
Attending: SURGERY
Payer: COMMERCIAL

## 2018-07-21 DIAGNOSIS — R79.89 ELEVATED LFTS: ICD-10-CM

## 2018-07-21 DIAGNOSIS — E66.01 MORBID OBESITY DUE TO EXCESS CALORIES (HCC): ICD-10-CM

## 2018-07-21 DIAGNOSIS — R79.89 ELEVATED TSH: ICD-10-CM

## 2018-07-21 LAB
ALBUMIN SERPL BCP-MCNC: 3.8 G/DL (ref 3.5–4.8)
ALP SERPL-CCNC: 69 U/L (ref 32–100)
ALT SERPL-CCNC: 78 U/L (ref 17–63)
AST SERPL-CCNC: 61 U/L (ref 15–41)
BILIRUB DIRECT SERPL-MCNC: 0.2 MG/DL (ref 0–0.2)
BILIRUB SERPL-MCNC: 0.6 MG/DL (ref 0.3–1.2)
FERRITIN SERPL IA-MCNC: 255 NG/ML (ref 24–336)
FOLATE SERPL-MCNC: >24 NG/ML
IRON SATN MFR SERPL: 24 % (ref 20–55)
IRON SERPL-MCNC: 76 MCG/DL (ref 45–182)
PROT SERPL-MCNC: 6.9 G/DL (ref 5.9–8.4)
TIBC SERPL-MCNC: 318 MCG/DL (ref 228–428)
TRANSFERRIN SERPL-MCNC: 241 MG/DL (ref 180–329)
TSH SERPL-ACNC: 3.31 UIU/ML (ref 0.45–5.33)
VIT B12 SERPL-MCNC: 541 PG/ML (ref 181–914)

## 2018-07-21 PROCEDURE — 84443 ASSAY THYROID STIM HORMONE: CPT

## 2018-07-21 PROCEDURE — 36415 COLL VENOUS BLD VENIPUNCTURE: CPT

## 2018-07-21 PROCEDURE — 82728 ASSAY OF FERRITIN: CPT

## 2018-07-21 PROCEDURE — 87340 HEPATITIS B SURFACE AG IA: CPT

## 2018-07-21 PROCEDURE — 86704 HEP B CORE ANTIBODY TOTAL: CPT

## 2018-07-21 PROCEDURE — 82746 ASSAY OF FOLIC ACID SERUM: CPT

## 2018-07-21 PROCEDURE — 80076 HEPATIC FUNCTION PANEL: CPT

## 2018-07-21 PROCEDURE — 86376 MICROSOMAL ANTIBODY EACH: CPT

## 2018-07-21 PROCEDURE — 76700 US EXAM ABDOM COMPLETE: CPT | Performed by: SURGERY

## 2018-07-21 PROCEDURE — 86708 HEPATITIS A ANTIBODY: CPT

## 2018-07-21 PROCEDURE — 86803 HEPATITIS C AB TEST: CPT

## 2018-07-21 PROCEDURE — 86709 HEPATITIS A IGM ANTIBODY: CPT

## 2018-07-21 PROCEDURE — 83540 ASSAY OF IRON: CPT

## 2018-07-21 PROCEDURE — 82607 VITAMIN B-12: CPT

## 2018-07-21 PROCEDURE — 84425 ASSAY OF VITAMIN B-1: CPT

## 2018-07-21 PROCEDURE — 84466 ASSAY OF TRANSFERRIN: CPT

## 2018-07-21 PROCEDURE — 86706 HEP B SURFACE ANTIBODY: CPT

## 2018-07-21 PROCEDURE — 80500 HEPATITIS A B + C PROFILE: CPT

## 2018-07-23 LAB
HAV AB SER QL IA: REACTIVE
HAV IGM SERPL QL IA: NONREACTIVE
HBV CORE AB SERPL QL IA: NONREACTIVE
HBV SURFACE AB SER-ACNC: <3.1 MIU/ML (ref ?–10)
HBV SURFACE AG SERPL QL IA: NONREACTIVE
HBV SURFACE AG SERPL QL IA: NONREACTIVE
HCV AB SERPL QL IA: NONREACTIVE
THYROPEROXIDASE AB SERPL-ACNC: 8.2 IU/ML (ref 0–9)

## 2018-07-24 ENCOUNTER — OFFICE VISIT (OUTPATIENT)
Dept: SURGERY | Facility: CLINIC | Age: 63
End: 2018-07-24
Payer: COMMERCIAL

## 2018-07-24 VITALS — WEIGHT: 285.88 LBS | BODY MASS INDEX: 43.33 KG/M2 | HEIGHT: 68 IN

## 2018-07-24 DIAGNOSIS — E66.01 MORBID OBESITY WITH BMI OF 40.0-44.9, ADULT (HCC): Primary | ICD-10-CM

## 2018-07-24 DIAGNOSIS — E11.9 TYPE 2 DIABETES MELLITUS WITHOUT COMPLICATION, WITHOUT LONG-TERM CURRENT USE OF INSULIN (HCC): ICD-10-CM

## 2018-07-24 PROCEDURE — 97802 MEDICAL NUTRITION INDIV IN: CPT | Performed by: DIETITIAN, REGISTERED

## 2018-07-27 LAB — VITAMIN B1 (THIAMINE), WHOLE B: 236 NMOL/L

## 2018-08-01 LAB
ALBUMIN: 4.1 G/DL
ALKALINE PHOSPHATATE(ALK PHOS): 56 IU/L
ALT (SGPT): 82 U/L
AST (SGOT): 67 U/L
BILIRUBIN TOTAL: 1 MG/DL
BUN: 12 MG/DL
CALCIUM: 9.3 MG/DL
CHLORIDE: 98 MEQ/L
CHOLESTEROL, TOTAL: 167 MG/DL
CREATININE, SERUM: 1.09 MG/DL
GLOBULIN: 3.1 G/DL
GLUCOSE: 132 MG/DL
GLUCOSE: 132 MG/DL
HDL CHOLESTEROL: 34 MG/DL
HEMATOCRIT: 47.1 %
HEMOGLOBIN: 16 G/DL
LDL CHOLESTEROL: 89 MG/DL
NON-HDL CHOLESTEROL: 133 MG/DL
PLATELETS: 132 K/UL
POTASSIUM, SERUM: 3.3 MEQ/L
PROTEIN, TOTAL: 7.2 G/DL
RED BLOOD COUNT: 5.1 X 10-6/U
SGOT (AST): 67 IU/L
SGPT (ALT): 82 IU/L
SODIUM: 137 MEQ/L
TRIGLYCERIDES: 221 MG/DL
WHITE BLOOD COUNT: 6 X 10-3/U

## 2018-08-01 RX ORDER — ROPINIROLE 0.25 MG/1
TABLET, FILM COATED ORAL
Qty: 60 TABLET | Refills: 0 | Status: SHIPPED | OUTPATIENT
Start: 2018-08-01 | End: 2018-08-31

## 2018-08-02 ENCOUNTER — APPOINTMENT (OUTPATIENT)
Dept: LAB | Facility: HOSPITAL | Age: 63
End: 2018-08-02
Attending: PHYSICIAN ASSISTANT
Payer: COMMERCIAL

## 2018-08-02 ENCOUNTER — TELEPHONE (OUTPATIENT)
Dept: GASTROENTEROLOGY | Facility: CLINIC | Age: 63
End: 2018-08-02

## 2018-08-02 ENCOUNTER — OFFICE VISIT (OUTPATIENT)
Dept: GASTROENTEROLOGY | Facility: CLINIC | Age: 63
End: 2018-08-02
Payer: COMMERCIAL

## 2018-08-02 VITALS
HEART RATE: 66 BPM | SYSTOLIC BLOOD PRESSURE: 134 MMHG | HEIGHT: 67 IN | BODY MASS INDEX: 44.57 KG/M2 | WEIGHT: 284 LBS | DIASTOLIC BLOOD PRESSURE: 84 MMHG

## 2018-08-02 DIAGNOSIS — R10.9 ABDOMINAL CRAMPING: ICD-10-CM

## 2018-08-02 DIAGNOSIS — R19.5 LOOSE STOOLS: Primary | ICD-10-CM

## 2018-08-02 DIAGNOSIS — K21.9 GASTROESOPHAGEAL REFLUX DISEASE, ESOPHAGITIS PRESENCE NOT SPECIFIED: ICD-10-CM

## 2018-08-02 DIAGNOSIS — K52.9 CHRONIC DIARRHEA: ICD-10-CM

## 2018-08-02 DIAGNOSIS — R10.13 DYSPEPSIA: ICD-10-CM

## 2018-08-02 DIAGNOSIS — R19.5 LOOSE STOOLS: ICD-10-CM

## 2018-08-02 LAB — IGA SERPL-MCNC: 260 MG/DL (ref 68–378)

## 2018-08-02 PROCEDURE — 82784 ASSAY IGA/IGD/IGG/IGM EACH: CPT

## 2018-08-02 PROCEDURE — 99244 OFF/OP CNSLTJ NEW/EST MOD 40: CPT | Performed by: PHYSICIAN ASSISTANT

## 2018-08-02 PROCEDURE — 83516 IMMUNOASSAY NONANTIBODY: CPT

## 2018-08-02 PROCEDURE — 36415 COLL VENOUS BLD VENIPUNCTURE: CPT

## 2018-08-02 PROCEDURE — 99212 OFFICE O/P EST SF 10 MIN: CPT | Performed by: PHYSICIAN ASSISTANT

## 2018-08-02 RX ORDER — POLYETHYLENE GLYCOL 3350, SODIUM CHLORIDE, SODIUM BICARBONATE, POTASSIUM CHLORIDE 420; 11.2; 5.72; 1.48 G/4L; G/4L; G/4L; G/4L
POWDER, FOR SOLUTION ORAL
Qty: 1 BOTTLE | Refills: 0 | Status: ON HOLD | OUTPATIENT
Start: 2018-08-02 | End: 2018-09-26 | Stop reason: ALTCHOICE

## 2018-08-02 NOTE — H&P
[address] [facility] [address] Gastroenterology                                                                                                  Clinic History and Physical     Pa 25 years. He never has reflux. No nausea, vomiting, hematemesis, dysphagia, odynophagia or epigastric pain. He does not take NSAIDs other than low dose aspirin. No CP or SOB. Denies unintentional weight loss. No rectal bleeding/hematochezia.      No j Oral Recon Soln Split dose preparation - take as directed.  Disp: 1 Bottle Rfl: 0   ROPINIROLE HCL 0.25 MG Oral Tab TAKE 2 TABLETS(0.5 MG) BY MOUTH EVERY NIGHT Disp: 60 tablet Rfl: 0   METFORMIN  MG Oral Tab TAKE 1 TABLET(500 MG) BY MOUTH DAILY WITH pink, the sclera appears anicteric, oropharynx clear, mucus membranes appear moist +thick neck circumference   CV: regular rate and rhythm  Lung: moves air well; no labored breathing  Abdomen: soft, obese, non-tender exam with +BS   Back: No CVA tenderness up. Likely fatty liver given morbid obesity - this may improve if gastric bypass is successful. IBS and functional components cannot be excluded. Follow up in 2-3 months in office. Consider CT scan if abd develops. Declines Bentyl today in office.     Recom

## 2018-08-02 NOTE — PATIENT INSTRUCTIONS
1. Schedule colonoscopy and upper endoscopy with MAC sedation @ [facility] or [facility].   - Dr. Elena Dodson or Rosanna Posada. 2.  bowel prep from pharmacy - I have prescribed Trilyte split dose preparation    3.  HOLD Metformin the day before and day of

## 2018-08-02 NOTE — TELEPHONE ENCOUNTER
Scheduled for:  Colonoscopy 49609 / [phone]  Provider Name: Dr. Imelda Gabriel  Date:  9/26/18  Location:  [facility]  Sedation:  MAC  Time:  8:15 am, arrival 7:15 am  Prep: Trilyte  Meds/Allergies Reconciled?:  Musa Bagley PA-C reviewed  \"3.  HOLD Metformin the day befor

## 2018-08-03 LAB — TTG IGG SER-ACNC: <0.6 U/ML (ref ?–7)

## 2018-08-06 ENCOUNTER — OFFICE VISIT (OUTPATIENT)
Dept: NUTRITION/OBESITY MEDICINE | Facility: HOSPITAL | Age: 63
End: 2018-08-06
Attending: SURGERY
Payer: COMMERCIAL

## 2018-08-06 ENCOUNTER — PRIOR ORIGINAL RECORDS (OUTPATIENT)
Dept: OTHER | Age: 63
End: 2018-08-06

## 2018-08-06 ENCOUNTER — MYAURORA ACCOUNT LINK (OUTPATIENT)
Dept: OTHER | Age: 63
End: 2018-08-06

## 2018-08-06 DIAGNOSIS — E66.01 MORBID OBESITY DUE TO EXCESS CALORIES (HCC): Primary | ICD-10-CM

## 2018-08-06 PROCEDURE — 96101 PSYCL TSTG PR HR F2F TIME W/PT: CPT | Performed by: OTHER

## 2018-08-06 NOTE — PROGRESS NOTES
Psychological Evaluation    Patient Name: Nupur Kaminski  Visit Date:  2018  :   1955    Reason for Referral:    Casper Platt is a 58year old single  male who was referred for a psychological evaluation prior to being scheduled for bariatric history of medical issues, Mirta Cheema stated that both his mother and maternal grandmother suffered from diabetes.     As it relates to a history of psychological and/or substance abuse issues, Mirta Cheema stated that he has seen both a psychiatrist and therapist to be no abnormalities in his sensorium or mental capacity. His overall affect was somewhat constricted, but his mood fell within the normal range. His speech and thought processes fell within the normal range.    Gauri Moreno appears to be motivated and ready thighs, agility, stomach, health, and weight, while regarding the latter, he endorsed holding negative feelings regarding his muscular strength, biceps, body build, physical condition, width of shoulders, chest, figure, and sex drive.   Conversely, he endor well-being and overall quality of life.   Regarding the former, he related that due to various health issues, notably diabetes, with which he has struggled and have worsened, he is concerned that his health will worsen and his quality of life will be impact

## 2018-08-08 ENCOUNTER — MYAURORA ACCOUNT LINK (OUTPATIENT)
Dept: OTHER | Age: 63
End: 2018-08-08

## 2018-08-10 ENCOUNTER — LAB ENCOUNTER (OUTPATIENT)
Dept: LAB | Facility: HOSPITAL | Age: 63
End: 2018-08-10
Attending: PHYSICIAN ASSISTANT
Payer: COMMERCIAL

## 2018-08-10 DIAGNOSIS — R19.5 LOOSE STOOLS: ICD-10-CM

## 2018-08-10 DIAGNOSIS — K52.9 CHRONIC DIARRHEA: ICD-10-CM

## 2018-08-10 LAB
C DIFF TOX B STL QL: NEGATIVE
CRYPTOSP AG STL QL IA: NEGATIVE
G LAMBLIA AG STL QL IA: NEGATIVE

## 2018-08-10 PROCEDURE — 87493 C DIFF AMPLIFIED PROBE: CPT

## 2018-08-10 PROCEDURE — 87046 STOOL CULTR AEROBIC BACT EA: CPT

## 2018-08-10 PROCEDURE — 87272 CRYPTOSPORIDIUM AG IF: CPT

## 2018-08-10 PROCEDURE — 87427 SHIGA-LIKE TOXIN AG IA: CPT

## 2018-08-10 PROCEDURE — 87045 FECES CULTURE AEROBIC BACT: CPT

## 2018-08-10 PROCEDURE — 87329 GIARDIA AG IA: CPT

## 2018-08-10 PROCEDURE — 87077 CULTURE AEROBIC IDENTIFY: CPT

## 2018-08-13 ENCOUNTER — PRIOR ORIGINAL RECORDS (OUTPATIENT)
Dept: OTHER | Age: 63
End: 2018-08-13

## 2018-08-16 ENCOUNTER — TELEPHONE (OUTPATIENT)
Dept: SURGERY | Facility: CLINIC | Age: 63
End: 2018-08-16

## 2018-08-24 ENCOUNTER — OFFICE VISIT (OUTPATIENT)
Dept: SURGERY | Facility: CLINIC | Age: 63
End: 2018-08-24
Payer: COMMERCIAL

## 2018-08-24 VITALS
HEART RATE: 62 BPM | SYSTOLIC BLOOD PRESSURE: 130 MMHG | WEIGHT: 280 LBS | OXYGEN SATURATION: 96 % | HEIGHT: 67 IN | BODY MASS INDEX: 43.95 KG/M2 | RESPIRATION RATE: 18 BRPM | DIASTOLIC BLOOD PRESSURE: 83 MMHG

## 2018-08-24 DIAGNOSIS — E11.9 TYPE 2 DIABETES MELLITUS WITHOUT COMPLICATION, WITHOUT LONG-TERM CURRENT USE OF INSULIN (HCC): ICD-10-CM

## 2018-08-24 DIAGNOSIS — I10 ESSENTIAL HYPERTENSION: ICD-10-CM

## 2018-08-24 DIAGNOSIS — E66.01 MORBID OBESITY WITH BMI OF 40.0-44.9, ADULT (HCC): Primary | ICD-10-CM

## 2018-08-24 DIAGNOSIS — Z86.718 HISTORY OF DVT (DEEP VEIN THROMBOSIS): ICD-10-CM

## 2018-08-24 PROCEDURE — 99213 OFFICE O/P EST LOW 20 MIN: CPT | Performed by: NURSE PRACTITIONER

## 2018-08-24 RX ORDER — ARMODAFINIL 250 MG/1
TABLET ORAL
Refills: 3 | COMMUNITY
Start: 2018-08-07 | End: 2019-12-23

## 2018-08-24 RX ORDER — CYANOCOBALAMIN 500 UG/1
SPRAY NASAL
Refills: 0 | COMMUNITY
Start: 2018-08-10 | End: 2019-01-16

## 2018-08-24 NOTE — PROGRESS NOTES
[address], [address] [facility] [address]  Dept: [phone]     Date:   2018    Patient:  Sebastien Sparks  :      1955  MRN:      [id number]    Chief Complaint: times daily. Disp:  Rfl:    omeprazole 20 MG Oral Capsule Delayed Release Take 20 mg by mouth every morning before breakfast. Disp:  Rfl:    tamsulosin HCl 0.4 MG Oral Cap Take by mouth daily.  Disp:  Rfl:    BuPROPion HCl ER, SR, (WELLBUTRIN SR) 150 MG O none  · Amount of water (in ounces) per day:  adequate  · Drinking between meals only:  no  · Toughest challenge:  exercise    Nutritional Goals  Limit carbohydrates to 100 gms per day, Eat 100-200 calories within 1 hour of waking  and Eat 3-4 cups of fres Discussed with patient the risks and benefits of RYGBP and VSG.  The patient is interested in bariatric surgery and will begin our presurgical process.      Still deciding between VSG vs RYGB     Ge Jorgensen attended bariatric info seminar     DM: stable; con

## 2018-08-29 ENCOUNTER — OFFICE VISIT (OUTPATIENT)
Dept: PULMONOLOGY | Facility: CLINIC | Age: 63
End: 2018-08-29
Payer: COMMERCIAL

## 2018-08-29 ENCOUNTER — TELEPHONE (OUTPATIENT)
Dept: PULMONOLOGY | Facility: CLINIC | Age: 63
End: 2018-08-29

## 2018-08-29 ENCOUNTER — HOSPITAL ENCOUNTER (OUTPATIENT)
Dept: GENERAL RADIOLOGY | Facility: HOSPITAL | Age: 63
Discharge: HOME | End: 2018-08-29
Attending: INTERNAL MEDICINE
Payer: COMMERCIAL

## 2018-08-29 VITALS
WEIGHT: 278.63 LBS | BODY MASS INDEX: 42.23 KG/M2 | DIASTOLIC BLOOD PRESSURE: 79 MMHG | SYSTOLIC BLOOD PRESSURE: 124 MMHG | OXYGEN SATURATION: 96 % | HEART RATE: 61 BPM | HEIGHT: 68 IN | RESPIRATION RATE: 19 BRPM

## 2018-08-29 DIAGNOSIS — R06.00 DOE (DYSPNEA ON EXERTION): ICD-10-CM

## 2018-08-29 DIAGNOSIS — Z71.89 ENCOUNTER FOR PRE-BARIATRIC SURGERY COUNSELING AND EDUCATION: Primary | ICD-10-CM

## 2018-08-29 DIAGNOSIS — I26.99 OTHER PULMONARY EMBOLISM WITHOUT ACUTE COR PULMONALE, UNSPECIFIED CHRONICITY (HCC): ICD-10-CM

## 2018-08-29 DIAGNOSIS — G47.33 OSA (OBSTRUCTIVE SLEEP APNEA): ICD-10-CM

## 2018-08-29 DIAGNOSIS — Z86.718 HISTORY OF DVT (DEEP VEIN THROMBOSIS): ICD-10-CM

## 2018-08-29 PROCEDURE — 71046 X-RAY EXAM CHEST 2 VIEWS: CPT | Performed by: INTERNAL MEDICINE

## 2018-08-29 PROCEDURE — 99212 OFFICE O/P EST SF 10 MIN: CPT | Performed by: INTERNAL MEDICINE

## 2018-08-29 PROCEDURE — 99244 OFF/OP CNSLTJ NEW/EST MOD 40: CPT | Performed by: INTERNAL MEDICINE

## 2018-08-29 NOTE — PATIENT INSTRUCTIONS
Please call us when you get home and let us know the model number of your CPAP machine    Please get an influenza \"Flu\" vaccine in the fall.

## 2018-08-29 NOTE — TELEPHONE ENCOUNTER
DONAL signed by patient faxed to Dr. Binta Wood at fax # [phone] requesting all cardiac testing. Signed DONAL sent to scanning.     Patient also provided information for his sleep doctor: Carrol Piña # [phone]

## 2018-08-29 NOTE — PROGRESS NOTES
Pulmonary Consult Note    HPI:   Ju Connell is a 58year old male with Patient presents with:  Bariatric Clearance: Pt stts he has used a cpap machine for 5 years now. Pt ts he had a sleep study 3 years ago at the [facility] in [facility].   Sl Disp: 60 tablet Rfl: 0   METFORMIN  MG Oral Tab TAKE 1 TABLET(500 MG) BY MOUTH DAILY WITH BREAKFAST Disp: 90 tablet Rfl: 0   Fluticasone Propionate (FLONASE) 50 MCG/ACT Nasal Suspension  Disp:  Rfl:    METOPROLOL SUCCINATE ER 50 MG Oral Tablet 24 Hr (126.4 kg)   SpO2 96%   BMI 42.36 kg/m²   NAD  A&Ox3  Head AT/NC  Anicteric  Lung CTA  CV reg Nl S1S2  Abd soft NT/ND  Ext +1  Edema R>L  Skin No rash  Psych Normal mood      ECHO Rush 2/17  SUMMARY:    1. Left ventricle:  The cavity size is normal. Wall th

## 2018-08-30 ENCOUNTER — TELEPHONE (OUTPATIENT)
Dept: PULMONOLOGY | Facility: CLINIC | Age: 63
End: 2018-08-30

## 2018-08-30 NOTE — TELEPHONE ENCOUNTER
Please let him know that I reviewed his cardiac testing and everything was normal    Unfortunately his current CPAP machine does not have the \"auto\" function.     I think its ok to wait until after his surgery and wt loss to get another CPAP titration michael

## 2018-08-30 NOTE — TELEPHONE ENCOUNTER
Pt called to give Dr. Earl Jacques the model # for his CPAP machine. Model is S9 H5I by [employer]. Call pt if any questions.

## 2018-08-31 ENCOUNTER — HOSPITAL ENCOUNTER (OUTPATIENT)
Dept: RESPIRATORY THERAPY | Facility: HOSPITAL | Age: 63
Discharge: HOME | End: 2018-08-31
Attending: INTERNAL MEDICINE
Payer: COMMERCIAL

## 2018-08-31 DIAGNOSIS — R06.00 DOE (DYSPNEA ON EXERTION): ICD-10-CM

## 2018-08-31 PROCEDURE — 94729 DIFFUSING CAPACITY: CPT | Performed by: INTERNAL MEDICINE

## 2018-08-31 PROCEDURE — 94060 EVALUATION OF WHEEZING: CPT | Performed by: INTERNAL MEDICINE

## 2018-08-31 PROCEDURE — 94726 PLETHYSMOGRAPHY LUNG VOLUMES: CPT | Performed by: INTERNAL MEDICINE

## 2018-08-31 RX ORDER — ROPINIROLE 0.25 MG/1
TABLET, FILM COATED ORAL
Qty: 60 TABLET | Refills: 0 | Status: SHIPPED | OUTPATIENT
Start: 2018-08-31 | End: 2018-09-28

## 2018-08-31 NOTE — TELEPHONE ENCOUNTER
Spoke w/ pt relayed MM message as written below. Pt instructed to cb after surgery which is not planned yet to discuss if just titration order will be needed or a face to face, pt verbalized understanding.

## 2018-08-31 NOTE — TELEPHONE ENCOUNTER
Rx approved  per protocol.     Refill Protocol Appointment Criteria  · Appointment scheduled in the past 6 months or in the next 3 months  Recent Outpatient Visits            2 days ago Encounter for pre-bariatric surgery counseling and education    Morgan

## 2018-09-09 NOTE — ADDENDUM NOTE
Encounter addended by: Lauren Betts MD on: 9/9/2018 3:11 PM   Actions taken: Sign clinical note, Charge Capture section accepted

## 2018-09-09 NOTE — PROCEDURES
[facility]    Patient's Name Governor Line MRN [id number]    1955 Pulmonologist Mily Resendiz MD   Location [facility] PCP Bry Del Real DO     IMPRESSION:    The PFTs are Normal.    The residual vo

## 2018-09-26 ENCOUNTER — ANESTHESIA EVENT (OUTPATIENT)
Dept: ENDOSCOPY | Facility: HOSPITAL | Age: 63
End: 2018-09-26
Payer: COMMERCIAL

## 2018-09-26 ENCOUNTER — HOSPITAL ENCOUNTER (OUTPATIENT)
Facility: HOSPITAL | Age: 63
Setting detail: HOSPITAL OUTPATIENT SURGERY
Discharge: HOME | End: 2018-09-26
Attending: INTERNAL MEDICINE | Admitting: INTERNAL MEDICINE
Payer: COMMERCIAL

## 2018-09-26 ENCOUNTER — ANESTHESIA (OUTPATIENT)
Dept: ENDOSCOPY | Facility: HOSPITAL | Age: 63
End: 2018-09-26
Payer: COMMERCIAL

## 2018-09-26 VITALS
TEMPERATURE: 98 F | SYSTOLIC BLOOD PRESSURE: 115 MMHG | OXYGEN SATURATION: 99 % | BODY MASS INDEX: 41.12 KG/M2 | DIASTOLIC BLOOD PRESSURE: 67 MMHG | HEART RATE: 60 BPM | WEIGHT: 262 LBS | RESPIRATION RATE: 15 BRPM | HEIGHT: 67 IN

## 2018-09-26 DIAGNOSIS — R19.5 LOOSE STOOLS: ICD-10-CM

## 2018-09-26 DIAGNOSIS — R10.13 DYSPEPSIA: ICD-10-CM

## 2018-09-26 DIAGNOSIS — R10.9 ABDOMINAL CRAMPING: ICD-10-CM

## 2018-09-26 DIAGNOSIS — K52.9 CHRONIC DIARRHEA: ICD-10-CM

## 2018-09-26 DIAGNOSIS — K21.9 GASTROESOPHAGEAL REFLUX DISEASE, ESOPHAGITIS PRESENCE NOT SPECIFIED: ICD-10-CM

## 2018-09-26 PROCEDURE — 45381 COLONOSCOPY SUBMUCOUS NJX: CPT | Performed by: INTERNAL MEDICINE

## 2018-09-26 PROCEDURE — 0DBK8ZX EXCISION OF ASCENDING COLON, VIA NATURAL OR ARTIFICIAL OPENING ENDOSCOPIC, DIAGNOSTIC: ICD-10-PCS | Performed by: INTERNAL MEDICINE

## 2018-09-26 PROCEDURE — 0DB48ZX EXCISION OF ESOPHAGOGASTRIC JUNCTION, VIA NATURAL OR ARTIFICIAL OPENING ENDOSCOPIC, DIAGNOSTIC: ICD-10-PCS | Performed by: INTERNAL MEDICINE

## 2018-09-26 PROCEDURE — 43239 EGD BIOPSY SINGLE/MULTIPLE: CPT | Performed by: INTERNAL MEDICINE

## 2018-09-26 PROCEDURE — 3E0H8GC INTRODUCTION OF OTHER THERAPEUTIC SUBSTANCE INTO LOWER GI, VIA NATURAL OR ARTIFICIAL OPENING ENDOSCOPIC: ICD-10-PCS | Performed by: INTERNAL MEDICINE

## 2018-09-26 PROCEDURE — 0DB68ZX EXCISION OF STOMACH, VIA NATURAL OR ARTIFICIAL OPENING ENDOSCOPIC, DIAGNOSTIC: ICD-10-PCS | Performed by: INTERNAL MEDICINE

## 2018-09-26 PROCEDURE — 0DB98ZX EXCISION OF DUODENUM, VIA NATURAL OR ARTIFICIAL OPENING ENDOSCOPIC, DIAGNOSTIC: ICD-10-PCS | Performed by: INTERNAL MEDICINE

## 2018-09-26 PROCEDURE — 45385 COLONOSCOPY W/LESION REMOVAL: CPT | Performed by: INTERNAL MEDICINE

## 2018-09-26 PROCEDURE — 0DBN8ZX EXCISION OF SIGMOID COLON, VIA NATURAL OR ARTIFICIAL OPENING ENDOSCOPIC, DIAGNOSTIC: ICD-10-PCS | Performed by: INTERNAL MEDICINE

## 2018-09-26 RX ORDER — MORPHINE SULFATE 4 MG/ML
4 INJECTION, SOLUTION INTRAMUSCULAR; INTRAVENOUS EVERY 10 MIN PRN
Status: DISCONTINUED | OUTPATIENT
Start: 2018-09-26 | End: 2018-09-26

## 2018-09-26 RX ORDER — SODIUM CHLORIDE, SODIUM LACTATE, POTASSIUM CHLORIDE, CALCIUM CHLORIDE 600; 310; 30; 20 MG/100ML; MG/100ML; MG/100ML; MG/100ML
INJECTION, SOLUTION INTRAVENOUS CONTINUOUS
Status: DISCONTINUED | OUTPATIENT
Start: 2018-09-26 | End: 2018-09-26

## 2018-09-26 RX ORDER — METOPROLOL TARTRATE 5 MG/5ML
2.5 INJECTION INTRAVENOUS ONCE
Status: DISCONTINUED | OUTPATIENT
Start: 2018-09-26 | End: 2018-09-26

## 2018-09-26 RX ORDER — MORPHINE SULFATE 4 MG/ML
2 INJECTION, SOLUTION INTRAMUSCULAR; INTRAVENOUS EVERY 10 MIN PRN
Status: DISCONTINUED | OUTPATIENT
Start: 2018-09-26 | End: 2018-09-26

## 2018-09-26 RX ORDER — ONDANSETRON 2 MG/ML
4 INJECTION INTRAMUSCULAR; INTRAVENOUS ONCE AS NEEDED
Status: DISCONTINUED | OUTPATIENT
Start: 2018-09-26 | End: 2018-09-26

## 2018-09-26 RX ORDER — HYDROCODONE BITARTRATE AND ACETAMINOPHEN 5; 325 MG/1; MG/1
1 TABLET ORAL AS NEEDED
Status: DISCONTINUED | OUTPATIENT
Start: 2018-09-26 | End: 2018-09-26

## 2018-09-26 RX ORDER — MORPHINE SULFATE 10 MG/ML
6 INJECTION, SOLUTION INTRAMUSCULAR; INTRAVENOUS EVERY 10 MIN PRN
Status: DISCONTINUED | OUTPATIENT
Start: 2018-09-26 | End: 2018-09-26

## 2018-09-26 RX ORDER — HALOPERIDOL 5 MG/ML
0.25 INJECTION INTRAMUSCULAR ONCE AS NEEDED
Status: DISCONTINUED | OUTPATIENT
Start: 2018-09-26 | End: 2018-09-26

## 2018-09-26 RX ORDER — LIDOCAINE HYDROCHLORIDE 10 MG/ML
INJECTION, SOLUTION EPIDURAL; INFILTRATION; INTRACAUDAL; PERINEURAL AS NEEDED
Status: DISCONTINUED | OUTPATIENT
Start: 2018-09-26 | End: 2018-09-26 | Stop reason: SURG

## 2018-09-26 RX ORDER — HYDROCODONE BITARTRATE AND ACETAMINOPHEN 5; 325 MG/1; MG/1
2 TABLET ORAL AS NEEDED
Status: DISCONTINUED | OUTPATIENT
Start: 2018-09-26 | End: 2018-09-26

## 2018-09-26 RX ORDER — DEXTROSE MONOHYDRATE 25 G/50ML
50 INJECTION, SOLUTION INTRAVENOUS
Status: DISCONTINUED | OUTPATIENT
Start: 2018-09-26 | End: 2018-09-26

## 2018-09-26 RX ORDER — NALOXONE HYDROCHLORIDE 0.4 MG/ML
80 INJECTION, SOLUTION INTRAMUSCULAR; INTRAVENOUS; SUBCUTANEOUS AS NEEDED
Status: DISCONTINUED | OUTPATIENT
Start: 2018-09-26 | End: 2018-09-26

## 2018-09-26 RX ADMIN — SODIUM CHLORIDE, SODIUM LACTATE, POTASSIUM CHLORIDE, CALCIUM CHLORIDE: 600; 310; 30; 20 INJECTION, SOLUTION INTRAVENOUS at 08:13:00

## 2018-09-26 RX ADMIN — LIDOCAINE HYDROCHLORIDE 100 MG: 10 INJECTION, SOLUTION EPIDURAL; INFILTRATION; INTRACAUDAL; PERINEURAL at 08:15:00

## 2018-09-26 NOTE — H&P
History & Physical Examination    Patient Name: Sue Vu  MRN: [id number]  [facility]: [id number]  YOB: 1955    Diagnosis: screening, diarrhea, dyspepsia, GERD      Medications Prior to Admission:  ROPINIROLE HCL 0.25 MG Oral Tab TAKE 2 TAB Diabetes ([facility]  No date: Dissociative identity disorder ([facility]  No date: DVT (deep venous thrombosis) ([facility])      Comment:  right leg  No date: Esophageal reflux  No date: H/O tracheostomy      Comment:  Age 2. 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   No date

## 2018-09-26 NOTE — ANESTHESIA POSTPROCEDURE EVALUATION
Patient: Leslie Negrete    Procedure Summary     Date:  09/26/18 Room / Location:  [facility] ENDOSCOPY 01 / [facility] ENDOSCOPY    Anesthesia Start:  0813 Anesthesia Stop:  7134    Procedures:       COLONOSCOPY (N/A )      ESOPHAGOGASTRODUODENOSCOPY (EGD) (N/A ) D

## 2018-09-26 NOTE — OPERATIVE REPORT
ESOPHAGOGASTRODUODENOSCOPY (EGD) & COLONOSCOPY REPORT    Joellen Teto     1955 Age 61year old   PCP Emelia Runner, DO Endoscopist Nasra Boss MD     Date of procedure: 18    Procedure: EGD w/cold biopsy & Colonoscopy w/hot sn colon were good with washing. I then carefully withdrew the instrument from the patient who tolerated the procedure well. Complications: None    EGD findings:      1. Esophagus:  The squamocolumnar junction was noted at 42 cm and appeared mildly irregul seen.  · Colonoscopy showed four polyps (s/p removal) and internal hemorrhoids. NO findings to explain change in bowels. Possibly IBS-D or medication related. Recommend:  · Await pathology. Repeat colonoscopy in 3 years.    · Low dose imodium to help wi

## 2018-09-26 NOTE — ANESTHESIA PREPROCEDURE EVALUATION
Anesthesia PreOp Note    HPI:     Ashley Husain is a 61year old male who presents for preoperative consultation requested by: Josseline Martel MD    Date of Surgery: 9/26/2018    Procedure(s):  COLONOSCOPY  ESOPHAGOGASTRODUODENOSCOPY (EGD)  Indicatio LARNGECTOMY      Comment:  as child for croup      Medications Prior to Admission:  ROPINIROLE HCL 0.25 MG Oral Tab TAKE 2 TABLETS(0.5 MG) BY MOUTH EVERY NIGHT Disp: 60 tablet Rfl: 0 9/25/2018   Armodafinil 250 MG Oral Tab  Disp:  Rfl: 3 9/24/2018   [facility]: [facility]      Spouse name: Not on file      Number of children: Not on file      Years of education: Not on file      Highest education level: Not on file    Social Needs      Financial resource strain: Not on file      Food insecurity - worry: Not o major complications, and any alternative forms of anesthetic management. All of the patient's questions were answered to the best of my ability. The patient desires the anesthetic management as planned.   Mehnaz Mack  9/26/2018 8:08 AM

## 2018-09-29 RX ORDER — METOPROLOL SUCCINATE 50 MG/1
TABLET, EXTENDED RELEASE ORAL
Qty: 90 TABLET | Refills: 0 | Status: SHIPPED | OUTPATIENT
Start: 2018-09-29 | End: 2018-12-12

## 2018-09-29 RX ORDER — ROPINIROLE 0.25 MG/1
TABLET, FILM COATED ORAL
Qty: 60 TABLET | Refills: 0 | Status: SHIPPED | OUTPATIENT
Start: 2018-09-29 | End: 2018-10-30

## 2018-09-29 NOTE — TELEPHONE ENCOUNTER
Requesting Ropinirole refill    Prescription refilled per IM/FM refill protocol, last OV with Dr. Herminia Aguilar 4/26/18    Refill Protocol Appointment Criteria  · Appointment scheduled in the past 6 months or in the next 3 months  Recent Outpatient Visits

## 2018-09-29 NOTE — TELEPHONE ENCOUNTER
Please review and advise regarding pended refill request as unable to refill per protocol.     Other med(s) refilled per [facility] protocol:    Hypertensive Medications  Protocol Criteria:  · Appointment scheduled in the past 6 months or in the next 3 GFRAA >60 03/22/2018    CA 9.3 03/22/2018    ALKPHOS 59 08/20/2015    AST 61 (H) 07/21/2018    ALT 78 (H) 07/21/2018    BILT 0.6 07/21/2018    TP 6.9 07/21/2018    ALB 3.8 07/21/2018     03/22/2018    K 3.3 03/22/2018    CL 98 03/22/2018    CO2 33

## 2018-10-01 ENCOUNTER — OFFICE VISIT (OUTPATIENT)
Dept: SURGERY | Facility: CLINIC | Age: 63
End: 2018-10-01
Payer: COMMERCIAL

## 2018-10-01 VITALS
RESPIRATION RATE: 16 BRPM | HEART RATE: 62 BPM | SYSTOLIC BLOOD PRESSURE: 125 MMHG | HEIGHT: 67 IN | WEIGHT: 265.38 LBS | OXYGEN SATURATION: 96 % | BODY MASS INDEX: 41.65 KG/M2 | DIASTOLIC BLOOD PRESSURE: 78 MMHG

## 2018-10-01 DIAGNOSIS — I10 ESSENTIAL HYPERTENSION: ICD-10-CM

## 2018-10-01 DIAGNOSIS — E66.01 MORBID OBESITY WITH BMI OF 40.0-44.9, ADULT (HCC): Primary | ICD-10-CM

## 2018-10-01 DIAGNOSIS — F41.9 ANXIETY: ICD-10-CM

## 2018-10-01 DIAGNOSIS — E11.9 TYPE 2 DIABETES MELLITUS WITHOUT COMPLICATION, WITHOUT LONG-TERM CURRENT USE OF INSULIN (HCC): ICD-10-CM

## 2018-10-01 DIAGNOSIS — G47.33 OSA (OBSTRUCTIVE SLEEP APNEA): ICD-10-CM

## 2018-10-01 DIAGNOSIS — Z86.718 HISTORY OF DVT (DEEP VEIN THROMBOSIS): ICD-10-CM

## 2018-10-01 PROCEDURE — 99213 OFFICE O/P EST LOW 20 MIN: CPT | Performed by: NURSE PRACTITIONER

## 2018-10-01 NOTE — PROGRESS NOTES
[address], [place] [address] Thi Lam  [facility] [address] [facility],[address]  Dept: [phone]     Date:   10/1/18    Patient:  Mercy Patrick  :      1955  MRN:      [id number]    Chief Complaint: Fluticasone Propionate (FLONASE) 50 MCG/ACT Nasal Suspension  Disp:  Rfl:    HYDROCHLOROTHIAZIDE 25 MG Oral Tab TAKE 1 TABLET BY MOUTH EVERY DAY Disp: 90 tablet Rfl: 0   ALPRAZolam 1 MG Oral Tab Take 1 mg by mouth 4 (four) times daily.    Disp:  Rfl:    o ENDOSCOPY  9/26/2018: ESOPHAGOGASTRODUODENOSCOPY (EGD); N/A      Comment:  Performed by Jose David Dodson MD at [facility] ENDOSCOPY  No date: TONSILLECTOMY  1952: [address]      Comment:  as child for croup  Family History:    Family History   P Physical Exam:   Physical Exam   Constitutional: He is oriented to person, place, and time. He appears well-developed and well-nourished. No distress. HENT:   Head: Normocephalic and atraumatic. Neck: Neck supple.    Pulmonary/Chest: Effort normal snacks in advance    Counseled Patient on comprehensive weight loss plan including attention to nutrition, exercise and behavior/stress management for in order to succeed and reach goals. Reviewed the importance of quality sleep and stress management.

## 2018-10-02 ENCOUNTER — TELEPHONE (OUTPATIENT)
Dept: GASTROENTEROLOGY | Facility: CLINIC | Age: 63
End: 2018-10-02

## 2018-10-02 NOTE — TELEPHONE ENCOUNTER
I mailed out colonoscopy/EGD results letter to pt  Updated health maintenance  Entered into 3 yr recall  Recall colon in 3 years per.  Colon done 9/26/18    GI staff: please place recall for colonoscopy in 3 years

## 2018-10-11 ENCOUNTER — OFFICE VISIT (OUTPATIENT)
Dept: PULMONOLOGY | Facility: CLINIC | Age: 63
End: 2018-10-11
Payer: COMMERCIAL

## 2018-10-11 VITALS
SYSTOLIC BLOOD PRESSURE: 118 MMHG | OXYGEN SATURATION: 97 % | HEIGHT: 67 IN | DIASTOLIC BLOOD PRESSURE: 72 MMHG | HEART RATE: 52 BPM | BODY MASS INDEX: 41.31 KG/M2 | WEIGHT: 263.19 LBS | RESPIRATION RATE: 18 BRPM

## 2018-10-11 DIAGNOSIS — Z86.718 HISTORY OF DVT (DEEP VEIN THROMBOSIS): ICD-10-CM

## 2018-10-11 DIAGNOSIS — E66.01 MORBID OBESITY (HCC): ICD-10-CM

## 2018-10-11 DIAGNOSIS — G47.33 OSA (OBSTRUCTIVE SLEEP APNEA): Primary | ICD-10-CM

## 2018-10-11 DIAGNOSIS — R06.00 DOE (DYSPNEA ON EXERTION): ICD-10-CM

## 2018-10-11 PROCEDURE — 99212 OFFICE O/P EST SF 10 MIN: CPT | Performed by: INTERNAL MEDICINE

## 2018-10-11 PROCEDURE — 99214 OFFICE O/P EST MOD 30 MIN: CPT | Performed by: INTERNAL MEDICINE

## 2018-10-11 RX ORDER — ALBUTEROL SULFATE 90 UG/1
2 AEROSOL, METERED RESPIRATORY (INHALATION) 4 TIMES DAILY PRN
Qty: 1 INHALER | Refills: 5 | Status: SHIPPED | OUTPATIENT
Start: 2018-10-11 | End: 2019-04-03

## 2018-10-11 NOTE — PROGRESS NOTES
Pulmonary Follow Up Note    HPI:   Leslie Smith is a 61year old male with Patient presents with:   Follow - Up: PFT results    Oscar Ho, DO    Feeling well  Using CPAP every night and rested in AM  Mild NASSAR and no change  Not worse  Occ wheeze Rfl: 3   NASCOBAL 500 MCG/0.1ML Nasal Solution  Disp:  Rfl: 0   Fluticasone Propionate (FLONASE) 50 MCG/ACT Nasal Suspension  Disp:  Rfl:    HYDROCHLOROTHIAZIDE 25 MG Oral Tab TAKE 1 TABLET BY MOUTH EVERY DAY Disp: 90 tablet Rfl: 0   ALPRAZolam 1 MG Oral T mood      PFT normal but with elevated RV   There is no significant change in flows after administration of bronchodilators. CXR normal       ASSESSMENT/PLAN:     60 yo with morbid obesity, childhood asthma, COLBY with pre op bariatric pulm f/u appt.     (

## 2018-10-12 NOTE — PATIENT INSTRUCTIONS
If you feel short of breath or chest tightness or cough, you can use your albuterol inhaler 4 times per day As Needed.

## 2018-10-15 ENCOUNTER — OFFICE VISIT (OUTPATIENT)
Dept: SURGERY | Facility: CLINIC | Age: 63
End: 2018-10-15
Payer: COMMERCIAL

## 2018-10-15 VITALS — WEIGHT: 258.63 LBS | BODY MASS INDEX: 40.59 KG/M2 | HEIGHT: 67 IN

## 2018-10-15 DIAGNOSIS — E66.01 MORBID OBESITY WITH BMI OF 40.0-44.9, ADULT (HCC): Primary | ICD-10-CM

## 2018-10-15 PROCEDURE — 0358T BIA WHOLE BODY: CPT | Performed by: DIETITIAN, REGISTERED

## 2018-10-15 PROCEDURE — 97803 MED NUTRITION INDIV SUBSEQ: CPT | Performed by: DIETITIAN, REGISTERED

## 2018-10-15 NOTE — PROGRESS NOTES
[address]  [address] [address]  Dept: [phone]  Loc: [phone]    10/15/18      Bariatric Follow-up Nutrition Session    Fabricio Rodriguez is a 61year old male.      As Results   Component Value Date    B12 541 07/21/2018     Lab Results   Component Value Date    FIDD09PY 27.1 03/22/2018     Lab Results   Component Value Date/Time    THIAMINE 236 (H) 07/21/2018 08:08 AM      No results found for: VITB1  Lab Results   Comp oz  08/24/18 : 280 lb      Weight change:  Down 27.3 lbs since last RD session 7.24.18  BMI: Body mass index is 40.5 kg/m².     Protein Intake: ~100 grams/day  Fluid intake:  ~64 ounces/day    Diet history:       Breakfast      AM Snack       Lunch     PM S Additional RD visits required to review concepts? Yes, to monitor goals and  for liquid protein diet  Patient understands protein requirements? Yes  Patient understand fluid requirements (amount and method of intake)?  Yes  Patient understands

## 2018-10-23 NOTE — TELEPHONE ENCOUNTER
Please review; protocol failed.   Hypertensive Medications  Protocol Criteria:  · Appointment scheduled in the past 6 months or in the next 3 months  · BMP or CMP in the past 12 months  · Creatinine result < 2  Recent Outpatient Visits            1 week ago

## 2018-10-24 RX ORDER — HYDROCHLOROTHIAZIDE 25 MG/1
TABLET ORAL
Qty: 90 TABLET | Refills: 0 | Status: SHIPPED | OUTPATIENT
Start: 2018-10-24 | End: 2018-11-08 | Stop reason: ALTCHOICE

## 2018-10-30 RX ORDER — ROPINIROLE 0.25 MG/1
TABLET, FILM COATED ORAL
Qty: 60 TABLET | Refills: 0 | Status: SHIPPED | OUTPATIENT
Start: 2018-10-30 | End: 2018-12-02

## 2018-11-06 NOTE — PROGRESS NOTES
[facility] Follow-up Visit    Lottie stool. Diarrhea has not worsened since Metformin - he has been on this medication x 10 years. He denies new skin rashes or joint pains, no mouth ulcers. This AM, he has already had 4 loose stools. There is associated gas and bloating.      Has been taking P adenoma of colon 09/2018    repeat CLN in 3 years       Past Surgical History:   Procedure Laterality Date   • APPENDECTOMY     • COLONOSCOPY  4/15/11   • COLONOSCOPY N/A 9/26/2018    Procedure: COLONOSCOPY;  Surgeon: Rhonda Goltz, MD;  Location: [address] [place] [address] Oral Tablet 12 Hr  Disp:  Rfl: 0   FLUoxetine HCl (PROZAC) 20 MG Oral Cap  Disp:  Rfl: 0   PSEUDOEPHEDRINE HCL OR Take  by mouth.  take 2 tablet (60MG)  by oral route  every 6 hours as needed Disp:  Rfl:    HYDROCHLOROTHIAZIDE 25 MG Oral Tab TAKE 1 TABLET B of the abdomen 7/21 c/w hepatic steatosis.  Patient is s/p (-) stool tests, negative Celiac Work up, and bi-directional with Dr. Molly Veal revealing tubular adenomatous polyps x 4 (three year c-scope recall) and mild gastric erythema in the stomach, negative fo

## 2018-11-08 ENCOUNTER — OFFICE VISIT (OUTPATIENT)
Dept: SURGERY | Facility: CLINIC | Age: 63
End: 2018-11-08
Payer: COMMERCIAL

## 2018-11-08 ENCOUNTER — OFFICE VISIT (OUTPATIENT)
Dept: GASTROENTEROLOGY | Facility: CLINIC | Age: 63
End: 2018-11-08
Payer: COMMERCIAL

## 2018-11-08 VITALS
DIASTOLIC BLOOD PRESSURE: 81 MMHG | BODY MASS INDEX: 39.25 KG/M2 | WEIGHT: 259 LBS | SYSTOLIC BLOOD PRESSURE: 136 MMHG | HEART RATE: 63 BPM | HEIGHT: 68 IN

## 2018-11-08 VITALS
SYSTOLIC BLOOD PRESSURE: 121 MMHG | DIASTOLIC BLOOD PRESSURE: 76 MMHG | BODY MASS INDEX: 39.68 KG/M2 | WEIGHT: 258.81 LBS | HEART RATE: 56 BPM | RESPIRATION RATE: 16 BRPM | HEIGHT: 67.7 IN

## 2018-11-08 DIAGNOSIS — Z09 FOLLOW UP: ICD-10-CM

## 2018-11-08 DIAGNOSIS — R19.5 LOOSE STOOLS: Primary | ICD-10-CM

## 2018-11-08 DIAGNOSIS — E66.01 MORBID OBESITY WITH BMI OF 40.0-44.9, ADULT (HCC): Primary | ICD-10-CM

## 2018-11-08 DIAGNOSIS — Z86.010 HISTORY OF COLON POLYPS: ICD-10-CM

## 2018-11-08 PROCEDURE — 99212 OFFICE O/P EST SF 10 MIN: CPT | Performed by: PHYSICIAN ASSISTANT

## 2018-11-08 PROCEDURE — 99214 OFFICE O/P EST MOD 30 MIN: CPT | Performed by: PHYSICIAN ASSISTANT

## 2018-11-08 NOTE — PATIENT INSTRUCTIONS
1. You can substitute whipping cream for lactose free half + half ([employer])    2. Kefir yogurt 99% lactose free ([employer] + specialty stores)    3.  Recall colonoscopy in 3 years - if your GI health changes prior to this, please do not hesitate t

## 2018-11-08 NOTE — PROGRESS NOTES
[address], [address]  Dept: [phone]    11/8/2018    BARIATRIC EXISTING PATIENT/FOLLOW UP    HPI:    6/28/18      287.9   44.2  10/1/18      265.4   41. eliquis/coumadin x 3 months.        Surg:   Tracheostomy (3years old) - for croup  Tonsillectomy  Open appy (1971)  Right bunionectomy     NKDA     SHx: neg x3, 1 glass wine/month    Lives with girlfriend - he does note his GF has a lot of food av time.    Did discuss some things that he can continue to work on as well. We discussed a lot of the differences between the sleeve and the bypass and some of his general concerns with bariatric surgery.       We will tentatively plan on scheduling a Andres-e

## 2018-11-27 ENCOUNTER — OFFICE VISIT (OUTPATIENT)
Dept: SURGERY | Facility: CLINIC | Age: 63
End: 2018-11-27
Payer: COMMERCIAL

## 2018-11-27 VITALS — HEIGHT: 67 IN | WEIGHT: 255.63 LBS | BODY MASS INDEX: 40.12 KG/M2

## 2018-11-27 DIAGNOSIS — E66.01 MORBID OBESITY WITH BMI OF 40.0-44.9, ADULT (HCC): Primary | ICD-10-CM

## 2018-11-27 DIAGNOSIS — E11.9 TYPE 2 DIABETES MELLITUS WITHOUT COMPLICATION, WITHOUT LONG-TERM CURRENT USE OF INSULIN (HCC): ICD-10-CM

## 2018-11-27 PROCEDURE — 97803 MED NUTRITION INDIV SUBSEQ: CPT | Performed by: DIETITIAN, REGISTERED

## 2018-11-27 NOTE — PROGRESS NOTES
[address] [place] [address] AND WEIGHT LOSS CLINIC  [address] [place] [address]  Dept: [phone]  Loc: [phone]    11/27/18      Bariatric Follow-up Nutrition Session    Sebastien Lopez is a 61year old male.      As Vitamins/Minerals:  Lab Results   Component Value Date    B12 541 07/21/2018     Lab Results   Component Value Date    DALJ29XG 27.1 03/22/2018     Lab Results   Component Value Date/Time    THIAMINE 236 (H) 07/21/2018 08:08 AM      No results found fo RD session 10.15.18  BMI: Body mass index is 40.03 kg/m².     Protein Intake:  grams/day  Fluid intake:  64 ounces/day    Diet history:       Breakfast      AM Snack       Lunch     PM Snack     Dinner   3 eggs or toast & yogurt spread with pisano seit Candelaria Gloria, MPH, RD/LDN    Face-to-face time spent with pt: 45 minutes (3 15-minute increments of MNT)

## 2018-12-03 RX ORDER — ROPINIROLE 0.25 MG/1
TABLET, FILM COATED ORAL
Qty: 60 TABLET | Refills: 0 | Status: SHIPPED | OUTPATIENT
Start: 2018-12-03 | End: 2019-01-16

## 2018-12-12 RX ORDER — METOPROLOL SUCCINATE 50 MG/1
TABLET, EXTENDED RELEASE ORAL
Qty: 90 TABLET | Refills: 0 | Status: SHIPPED | OUTPATIENT
Start: 2018-12-12 | End: 2019-03-29

## 2018-12-13 NOTE — TELEPHONE ENCOUNTER
Please review; protocol failed.     Hypertensive Medications  Protocol Criteria:  · Appointment scheduled in the past 6 months or in the next 3 months  · BMP or CMP in the past 12 months  · Creatinine result < 2  Recent Outpatient Visits            2 weeks 08/20/2015    ANIONGAP 6 03/22/2018    OSMOCALC 286 03/22/2018

## 2018-12-20 ENCOUNTER — OFFICE VISIT (OUTPATIENT)
Dept: SURGERY | Facility: CLINIC | Age: 63
End: 2018-12-20
Payer: COMMERCIAL

## 2018-12-20 VITALS — WEIGHT: 253.69 LBS | HEIGHT: 67 IN | BODY MASS INDEX: 39.82 KG/M2

## 2018-12-20 DIAGNOSIS — E66.09 CLASS 2 OBESITY DUE TO EXCESS CALORIES WITH BODY MASS INDEX (BMI) OF 39.0 TO 39.9 IN ADULT, UNSPECIFIED WHETHER SERIOUS COMORBIDITY PRESENT: Primary | ICD-10-CM

## 2018-12-20 PROCEDURE — 97803 MED NUTRITION INDIV SUBSEQ: CPT | Performed by: DIETITIAN, REGISTERED

## 2018-12-20 NOTE — PROGRESS NOTES
[address] [place] [address] AND WEIGHT LOSS CLINIC  [address] [place] [address]  Dept: [phone]  Loc: [phone]    12/20/18    Bariatric Liquid Protein Nutrition Session    Shan Winters is a 61year old male    A 132 (L) 03/22/2018     (L) 11/09/2016     09/15/2011        Diabetes:    HGBA1C:    Lab Results   Component Value Date    A1C 6.4 (H) 03/22/2018    A1C 6.6 (H) 10/13/2017    A1C 6.3 (H) 11/09/2016       Lipid Panel:  Lab Results   Component V PSEUDOEPHEDRINE HCL OR, Take  by mouth.  take 2 tablet (60MG)  by oral route  every 6 hours as needed, Disp: , Rfl:       Total Kcal: 6011-9901 calories/day  Protein intake: ~70 grams/day  Fluid intake:  64 ounces/day    Excessive in: nothing  Inadequate in Yes    Edyta Reyes, MPH, RD/LDN    Face-to-face time spent with pt: 60 minutes (4 15-minute increments of MNT)

## 2018-12-26 ENCOUNTER — OFFICE VISIT (OUTPATIENT)
Dept: SURGERY | Facility: CLINIC | Age: 63
End: 2018-12-26
Payer: COMMERCIAL

## 2018-12-26 VITALS
RESPIRATION RATE: 16 BRPM | HEIGHT: 67 IN | DIASTOLIC BLOOD PRESSURE: 72 MMHG | WEIGHT: 253.81 LBS | SYSTOLIC BLOOD PRESSURE: 126 MMHG | BODY MASS INDEX: 39.84 KG/M2 | HEART RATE: 60 BPM

## 2018-12-26 DIAGNOSIS — Z99.89 OSA ON CPAP: ICD-10-CM

## 2018-12-26 DIAGNOSIS — E66.9 OBESITY (BMI 30-39.9): ICD-10-CM

## 2018-12-26 DIAGNOSIS — I10 ESSENTIAL HYPERTENSION: Primary | ICD-10-CM

## 2018-12-26 DIAGNOSIS — G47.33 OSA ON CPAP: ICD-10-CM

## 2018-12-26 DIAGNOSIS — E11.9 TYPE 2 DIABETES MELLITUS WITHOUT COMPLICATION, WITHOUT LONG-TERM CURRENT USE OF INSULIN (HCC): ICD-10-CM

## 2018-12-26 PROCEDURE — 99214 OFFICE O/P EST MOD 30 MIN: CPT | Performed by: INTERNAL MEDICINE

## 2018-12-26 RX ORDER — PANTOPRAZOLE SODIUM 20 MG/1
20 TABLET, DELAYED RELEASE ORAL
Qty: 30 TABLET | Refills: 2 | Status: ON HOLD | OUTPATIENT
Start: 2018-12-26 | End: 2019-01-29

## 2018-12-26 RX ORDER — BUPROPION HYDROCHLORIDE 100 MG/1
100 TABLET ORAL 3 TIMES DAILY
Qty: 90 TABLET | Refills: 2 | Status: SHIPPED | OUTPATIENT
Start: 2018-12-26 | End: 2019-03-18

## 2018-12-26 NOTE — PROGRESS NOTES
[address], [address]  Dept: [phone]       Patient:  Tamar Capps  :      1955  MRN:      [id number]    Chief Complaint:  Patient presents METFORMIN  MG Oral Tab TAKE 1 TABLET(500 MG) BY MOUTH DAILY WITH BREAKFAST Disp: 90 tablet Rfl: 0   Armodafinil 250 MG Oral Tab  Disp:  Rfl: 3   NASCOBAL 500 MCG/0.1ML Nasal Solution  Disp:  Rfl: 0   Fluticasone Propionate (FLONASE) 50 MCG/ACT Reji ESOPHAGOGASTRODUODENOSCOPY (EGD) N/A 9/26/2018    Performed by Kelsey Whitley MD at [facility] ENDOSCOPY   • TONSILLECTOMY     • [address]    as child for croup     Family History:    Family History   Problem Relation Age of Onset   • Erica Qiu Constitutional: He is oriented to person, place, and time. He appears well-developed and well-nourished. No distress. HENT:   Head: Normocephalic and atraumatic. Neck: Neck supple. Pulmonary/Chest: Effort normal. No respiratory distress.    Abdomina

## 2018-12-26 NOTE — PATIENT INSTRUCTIONS
Outpatient Encounter Medications as of 12/26/2018   Medication Sig Note   • BuPROPion HCl (WELLBUTRIN) 100 MG Oral Tab Take 1 tablet (100 mg total) by mouth 3 (three) times daily.  12/26/2018: Crush tablet    • Pantoprazole Sodium 20 MG Oral Tab EC Take 1 t

## 2018-12-27 ENCOUNTER — OFFICE VISIT (OUTPATIENT)
Dept: SURGERY | Facility: CLINIC | Age: 63
End: 2018-12-27
Payer: COMMERCIAL

## 2018-12-27 VITALS
BODY MASS INDEX: 39.17 KG/M2 | HEIGHT: 67.7 IN | HEART RATE: 71 BPM | RESPIRATION RATE: 16 BRPM | DIASTOLIC BLOOD PRESSURE: 91 MMHG | WEIGHT: 255.5 LBS | SYSTOLIC BLOOD PRESSURE: 159 MMHG

## 2018-12-27 DIAGNOSIS — E11.9 TYPE 2 DIABETES MELLITUS WITHOUT COMPLICATION, WITHOUT LONG-TERM CURRENT USE OF INSULIN (HCC): ICD-10-CM

## 2018-12-27 DIAGNOSIS — E66.01 MORBID OBESITY WITH BMI OF 40.0-44.9, ADULT (HCC): Primary | ICD-10-CM

## 2018-12-27 NOTE — PROGRESS NOTES
[address], [address]  Dept: [phone]    12/27/2018    BARIATRIC EXISTING PATIENT/FOLLOW UP    HPI:    6/28/18      287.9   44.2  10/1/18      265.4   41 HTN  T2DM  DVT - Right LE -  Was on eliquis/coumadin x 3 months.        Surg:   Tracheostomy (3years old) - for croup  Tonsillectomy  Open audra (1971)  Right bunionectomy     NKDA     SHx: neg x3, 1 glass wine/month    Lives with girlfriend - he of the RYGB, along with the potential for dumping syndrome postoperatively. I discussed the possibility of encountering a hiatal hernia at the time of surgery and the indications for repair if one is found.   I discussed the anticipated hospital stay and r

## 2019-01-08 ENCOUNTER — ANTI-COAG VISIT (OUTPATIENT)
Dept: INTERNAL MEDICINE CLINIC | Facility: CLINIC | Age: 64
End: 2019-01-08

## 2019-01-08 DIAGNOSIS — I82.4Y1 ACUTE DEEP VEIN THROMBOSIS (DVT) OF PROXIMAL VEIN OF RIGHT LOWER EXTREMITY (HCC): ICD-10-CM

## 2019-01-17 ENCOUNTER — LAB ENCOUNTER (OUTPATIENT)
Dept: LAB | Facility: HOSPITAL | Age: 64
End: 2019-01-17
Attending: SURGERY
Payer: COMMERCIAL

## 2019-01-17 DIAGNOSIS — Z01.818 PREOP TESTING: ICD-10-CM

## 2019-01-17 LAB
ANTIBODY SCREEN: NEGATIVE
RH BLOOD TYPE: POSITIVE

## 2019-01-17 PROCEDURE — 86900 BLOOD TYPING SEROLOGIC ABO: CPT

## 2019-01-17 PROCEDURE — 36415 COLL VENOUS BLD VENIPUNCTURE: CPT

## 2019-01-17 PROCEDURE — 86850 RBC ANTIBODY SCREEN: CPT

## 2019-01-17 PROCEDURE — 86901 BLOOD TYPING SEROLOGIC RH(D): CPT

## 2019-01-17 RX ORDER — ROPINIROLE 0.25 MG/1
TABLET, FILM COATED ORAL
Qty: 60 TABLET | Refills: 0 | Status: SHIPPED | OUTPATIENT
Start: 2019-01-17 | End: 2019-02-15

## 2019-01-17 NOTE — TELEPHONE ENCOUNTER
Please review; protocol failed.     Refill Protocol Appointment Criteria  · Appointment scheduled in the past 6 months or in the next 3 months  Recent Outpatient Visits            2 weeks ago Morbid obesity with BMI of 40.0-44.9, [facility]    Manisha Mem

## 2019-01-27 ENCOUNTER — ANESTHESIA EVENT (OUTPATIENT)
Dept: SURGERY | Facility: HOSPITAL | Age: 64
DRG: 621 | End: 2019-01-27
Payer: COMMERCIAL

## 2019-01-28 ENCOUNTER — ANESTHESIA (OUTPATIENT)
Dept: SURGERY | Facility: HOSPITAL | Age: 64
DRG: 621 | End: 2019-01-28
Payer: COMMERCIAL

## 2019-01-28 ENCOUNTER — HOSPITAL ENCOUNTER (INPATIENT)
Facility: HOSPITAL | Age: 64
LOS: 1 days | Discharge: HOME | DRG: 621 | End: 2019-01-29
Attending: SURGERY | Admitting: SURGERY
Payer: COMMERCIAL

## 2019-01-28 DIAGNOSIS — Z01.818 PREOP TESTING: Primary | ICD-10-CM

## 2019-01-28 LAB
EST. AVERAGE GLUCOSE BLD GHB EST-MCNC: 100 MG/DL (ref 68–126)
GLUCOSE BLDC GLUCOMTR-MCNC: 104 MG/DL (ref 70–99)
GLUCOSE BLDC GLUCOMTR-MCNC: 105 MG/DL (ref 70–99)
GLUCOSE BLDC GLUCOMTR-MCNC: 122 MG/DL (ref 70–99)
GLUCOSE BLDC GLUCOMTR-MCNC: 134 MG/DL (ref 70–99)
HBA1C MFR BLD HPLC: 5.1 % (ref ?–5.7)

## 2019-01-28 PROCEDURE — 0D164ZA BYPASS STOMACH TO JEJUNUM, PERCUTANEOUS ENDOSCOPIC APPROACH: ICD-10-PCS | Performed by: SURGERY

## 2019-01-28 PROCEDURE — 0DJ08ZZ INSPECTION OF UPPER INTESTINAL TRACT, VIA NATURAL OR ARTIFICIAL OPENING ENDOSCOPIC: ICD-10-PCS | Performed by: SURGERY

## 2019-01-28 PROCEDURE — 99232 SBSQ HOSP IP/OBS MODERATE 35: CPT | Performed by: HOSPITALIST

## 2019-01-28 RX ORDER — EPHEDRINE SULFATE 50 MG/ML
INJECTION, SOLUTION INTRAVENOUS AS NEEDED
Status: DISCONTINUED | OUTPATIENT
Start: 2019-01-28 | End: 2019-01-28 | Stop reason: SURG

## 2019-01-28 RX ORDER — MORPHINE SULFATE 10 MG/ML
6 INJECTION, SOLUTION INTRAMUSCULAR; INTRAVENOUS EVERY 10 MIN PRN
Status: DISCONTINUED | OUTPATIENT
Start: 2019-01-28 | End: 2019-01-28 | Stop reason: HOSPADM

## 2019-01-28 RX ORDER — ALPRAZOLAM 1 MG/1
1 TABLET ORAL 4 TIMES DAILY PRN
Status: DISCONTINUED | OUTPATIENT
Start: 2019-01-28 | End: 2019-01-29

## 2019-01-28 RX ORDER — KETOROLAC TROMETHAMINE 15 MG/ML
15 INJECTION, SOLUTION INTRAMUSCULAR; INTRAVENOUS EVERY 6 HOURS
Status: DISCONTINUED | OUTPATIENT
Start: 2019-01-28 | End: 2019-01-28 | Stop reason: ALTCHOICE

## 2019-01-28 RX ORDER — PANTOPRAZOLE SODIUM 20 MG/1
20 TABLET, DELAYED RELEASE ORAL
Status: DISCONTINUED | OUTPATIENT
Start: 2019-01-29 | End: 2019-01-28 | Stop reason: ALTCHOICE

## 2019-01-28 RX ORDER — MIDAZOLAM HYDROCHLORIDE 1 MG/ML
INJECTION INTRAMUSCULAR; INTRAVENOUS AS NEEDED
Status: DISCONTINUED | OUTPATIENT
Start: 2019-01-28 | End: 2019-01-28 | Stop reason: SURG

## 2019-01-28 RX ORDER — ROCURONIUM BROMIDE 10 MG/ML
INJECTION, SOLUTION INTRAVENOUS AS NEEDED
Status: DISCONTINUED | OUTPATIENT
Start: 2019-01-28 | End: 2019-01-28 | Stop reason: SURG

## 2019-01-28 RX ORDER — ALFUZOSIN HYDROCHLORIDE 10 MG/1
10 TABLET, EXTENDED RELEASE ORAL
Status: DISCONTINUED | OUTPATIENT
Start: 2019-01-28 | End: 2019-01-29

## 2019-01-28 RX ORDER — KETOROLAC TROMETHAMINE 30 MG/ML
30 INJECTION, SOLUTION INTRAMUSCULAR; INTRAVENOUS EVERY 6 HOURS
Status: DISCONTINUED | OUTPATIENT
Start: 2019-01-28 | End: 2019-01-29

## 2019-01-28 RX ORDER — ONDANSETRON 2 MG/ML
4 INJECTION INTRAMUSCULAR; INTRAVENOUS EVERY 6 HOURS PRN
Status: DISCONTINUED | OUTPATIENT
Start: 2019-01-28 | End: 2019-01-29

## 2019-01-28 RX ORDER — HEPARIN SODIUM 5000 [USP'U]/ML
5000 INJECTION, SOLUTION INTRAVENOUS; SUBCUTANEOUS EVERY 8 HOURS
Status: DISCONTINUED | OUTPATIENT
Start: 2019-01-29 | End: 2019-01-29

## 2019-01-28 RX ORDER — ACETAMINOPHEN 10 MG/ML
1000 INJECTION, SOLUTION INTRAVENOUS EVERY 6 HOURS
Status: COMPLETED | OUTPATIENT
Start: 2019-01-28 | End: 2019-01-29

## 2019-01-28 RX ORDER — HALOPERIDOL 5 MG/ML
0.25 INJECTION INTRAMUSCULAR ONCE AS NEEDED
Status: DISCONTINUED | OUTPATIENT
Start: 2019-01-28 | End: 2019-01-28 | Stop reason: HOSPADM

## 2019-01-28 RX ORDER — ALBUTEROL SULFATE 90 UG/1
2 AEROSOL, METERED RESPIRATORY (INHALATION) 4 TIMES DAILY PRN
Status: DISCONTINUED | OUTPATIENT
Start: 2019-01-28 | End: 2019-01-29

## 2019-01-28 RX ORDER — METOPROLOL SUCCINATE 50 MG/1
50 TABLET, EXTENDED RELEASE ORAL
Status: DISCONTINUED | OUTPATIENT
Start: 2019-01-29 | End: 2019-01-29

## 2019-01-28 RX ORDER — BUPIVACAINE HYDROCHLORIDE AND EPINEPHRINE 5; 5 MG/ML; UG/ML
INJECTION, SOLUTION PERINEURAL AS NEEDED
Status: DISCONTINUED | OUTPATIENT
Start: 2019-01-28 | End: 2019-01-28 | Stop reason: HOSPADM

## 2019-01-28 RX ORDER — MORPHINE SULFATE 2 MG/ML
1 INJECTION, SOLUTION INTRAMUSCULAR; INTRAVENOUS EVERY 2 HOUR PRN
Status: DISCONTINUED | OUTPATIENT
Start: 2019-01-28 | End: 2019-01-29

## 2019-01-28 RX ORDER — SODIUM CHLORIDE, SODIUM LACTATE, POTASSIUM CHLORIDE, CALCIUM CHLORIDE 600; 310; 30; 20 MG/100ML; MG/100ML; MG/100ML; MG/100ML
INJECTION, SOLUTION INTRAVENOUS CONTINUOUS
Status: DISCONTINUED | OUTPATIENT
Start: 2019-01-28 | End: 2019-01-29

## 2019-01-28 RX ORDER — NEOSTIGMINE METHYLSULFATE 0.5 MG/ML
INJECTION INTRAVENOUS AS NEEDED
Status: DISCONTINUED | OUTPATIENT
Start: 2019-01-28 | End: 2019-01-28 | Stop reason: SURG

## 2019-01-28 RX ORDER — CEFAZOLIN SODIUM/WATER 2 G/20 ML
2 SYRINGE (ML) INTRAVENOUS EVERY 8 HOURS
Status: COMPLETED | OUTPATIENT
Start: 2019-01-28 | End: 2019-01-28

## 2019-01-28 RX ORDER — SODIUM CHLORIDE 0.9 % (FLUSH) 0.9 %
10 SYRINGE (ML) INJECTION AS NEEDED
Status: DISCONTINUED | OUTPATIENT
Start: 2019-01-28 | End: 2019-01-29

## 2019-01-28 RX ORDER — NALOXONE HYDROCHLORIDE 0.4 MG/ML
80 INJECTION, SOLUTION INTRAMUSCULAR; INTRAVENOUS; SUBCUTANEOUS AS NEEDED
Status: DISCONTINUED | OUTPATIENT
Start: 2019-01-28 | End: 2019-01-28 | Stop reason: HOSPADM

## 2019-01-28 RX ORDER — DEXTROSE MONOHYDRATE 25 G/50ML
50 INJECTION, SOLUTION INTRAVENOUS
Status: DISCONTINUED | OUTPATIENT
Start: 2019-01-28 | End: 2019-01-28 | Stop reason: HOSPADM

## 2019-01-28 RX ORDER — LIDOCAINE HYDROCHLORIDE 10 MG/ML
INJECTION, SOLUTION EPIDURAL; INFILTRATION; INTRACAUDAL; PERINEURAL AS NEEDED
Status: DISCONTINUED | OUTPATIENT
Start: 2019-01-28 | End: 2019-01-28 | Stop reason: SURG

## 2019-01-28 RX ORDER — CEFAZOLIN SODIUM/WATER 2 G/20 ML
2 SYRINGE (ML) INTRAVENOUS ONCE
Status: COMPLETED | OUTPATIENT
Start: 2019-01-28 | End: 2019-01-28

## 2019-01-28 RX ORDER — DEXTROSE MONOHYDRATE 25 G/50ML
50 INJECTION, SOLUTION INTRAVENOUS AS NEEDED
Status: DISCONTINUED | OUTPATIENT
Start: 2019-01-28 | End: 2019-01-29

## 2019-01-28 RX ORDER — GLYCOPYRROLATE 0.2 MG/ML
INJECTION INTRAMUSCULAR; INTRAVENOUS AS NEEDED
Status: DISCONTINUED | OUTPATIENT
Start: 2019-01-28 | End: 2019-01-28 | Stop reason: SURG

## 2019-01-28 RX ORDER — MORPHINE SULFATE 4 MG/ML
4 INJECTION, SOLUTION INTRAMUSCULAR; INTRAVENOUS EVERY 10 MIN PRN
Status: DISCONTINUED | OUTPATIENT
Start: 2019-01-28 | End: 2019-01-28 | Stop reason: HOSPADM

## 2019-01-28 RX ORDER — ARMODAFINIL 250 MG/1
250 TABLET ORAL DAILY
Status: DISCONTINUED | OUTPATIENT
Start: 2019-01-29 | End: 2019-01-29 | Stop reason: RX

## 2019-01-28 RX ORDER — MORPHINE SULFATE 2 MG/ML
2 INJECTION, SOLUTION INTRAMUSCULAR; INTRAVENOUS EVERY 2 HOUR PRN
Status: DISCONTINUED | OUTPATIENT
Start: 2019-01-28 | End: 2019-01-29

## 2019-01-28 RX ORDER — BACITRACIN 50000 [USP'U]/1
INJECTION, POWDER, LYOPHILIZED, FOR SOLUTION INTRAMUSCULAR AS NEEDED
Status: DISCONTINUED | OUTPATIENT
Start: 2019-01-28 | End: 2019-01-28 | Stop reason: HOSPADM

## 2019-01-28 RX ORDER — METOPROLOL TARTRATE 5 MG/5ML
2.5 INJECTION INTRAVENOUS ONCE
Status: DISCONTINUED | OUTPATIENT
Start: 2019-01-28 | End: 2019-01-28 | Stop reason: HOSPADM

## 2019-01-28 RX ORDER — HYDROCODONE BITARTRATE AND ACETAMINOPHEN 5; 325 MG/1; MG/1
1 TABLET ORAL AS NEEDED
Status: DISCONTINUED | OUTPATIENT
Start: 2019-01-28 | End: 2019-01-28 | Stop reason: HOSPADM

## 2019-01-28 RX ORDER — PANTOPRAZOLE SODIUM 40 MG/1
40 TABLET, DELAYED RELEASE ORAL
Status: DISCONTINUED | OUTPATIENT
Start: 2019-01-29 | End: 2019-01-29

## 2019-01-28 RX ORDER — HYDRALAZINE HYDROCHLORIDE 20 MG/ML
INJECTION INTRAMUSCULAR; INTRAVENOUS AS NEEDED
Status: DISCONTINUED | OUTPATIENT
Start: 2019-01-28 | End: 2019-01-28 | Stop reason: SURG

## 2019-01-28 RX ORDER — ONDANSETRON 2 MG/ML
4 INJECTION INTRAMUSCULAR; INTRAVENOUS ONCE AS NEEDED
Status: COMPLETED | OUTPATIENT
Start: 2019-01-28 | End: 2019-01-28

## 2019-01-28 RX ORDER — FAMOTIDINE 20 MG/1
20 TABLET ORAL ONCE
Status: DISCONTINUED | OUTPATIENT
Start: 2019-01-28 | End: 2019-01-28 | Stop reason: HOSPADM

## 2019-01-28 RX ORDER — DEXAMETHASONE SODIUM PHOSPHATE 4 MG/ML
VIAL (ML) INJECTION AS NEEDED
Status: DISCONTINUED | OUTPATIENT
Start: 2019-01-28 | End: 2019-01-28 | Stop reason: SURG

## 2019-01-28 RX ORDER — ACETAMINOPHEN 500 MG
1000 TABLET ORAL ONCE
Status: COMPLETED | OUTPATIENT
Start: 2019-01-28 | End: 2019-01-28

## 2019-01-28 RX ORDER — ROPINIROLE 0.5 MG/1
0.5 TABLET, FILM COATED ORAL NIGHTLY
Status: DISCONTINUED | OUTPATIENT
Start: 2019-01-28 | End: 2019-01-29

## 2019-01-28 RX ORDER — BUPROPION HYDROCHLORIDE 100 MG/1
100 TABLET ORAL 3 TIMES DAILY
Status: DISCONTINUED | OUTPATIENT
Start: 2019-01-28 | End: 2019-01-29

## 2019-01-28 RX ORDER — MORPHINE SULFATE 4 MG/ML
4 INJECTION, SOLUTION INTRAMUSCULAR; INTRAVENOUS EVERY 2 HOUR PRN
Status: DISCONTINUED | OUTPATIENT
Start: 2019-01-28 | End: 2019-01-29

## 2019-01-28 RX ORDER — HEPARIN SODIUM 5000 [USP'U]/ML
5000 INJECTION, SOLUTION INTRAVENOUS; SUBCUTANEOUS ONCE
Status: COMPLETED | OUTPATIENT
Start: 2019-01-28 | End: 2019-01-28

## 2019-01-28 RX ORDER — HYDROCODONE BITARTRATE AND ACETAMINOPHEN 5; 325 MG/1; MG/1
2 TABLET ORAL AS NEEDED
Status: DISCONTINUED | OUTPATIENT
Start: 2019-01-28 | End: 2019-01-28 | Stop reason: HOSPADM

## 2019-01-28 RX ORDER — SODIUM CHLORIDE, SODIUM LACTATE, POTASSIUM CHLORIDE, CALCIUM CHLORIDE 600; 310; 30; 20 MG/100ML; MG/100ML; MG/100ML; MG/100ML
INJECTION, SOLUTION INTRAVENOUS CONTINUOUS
Status: DISCONTINUED | OUTPATIENT
Start: 2019-01-28 | End: 2019-01-28 | Stop reason: ALTCHOICE

## 2019-01-28 RX ORDER — SODIUM CHLORIDE, SODIUM LACTATE, POTASSIUM CHLORIDE, CALCIUM CHLORIDE 600; 310; 30; 20 MG/100ML; MG/100ML; MG/100ML; MG/100ML
INJECTION, SOLUTION INTRAVENOUS CONTINUOUS
Status: DISCONTINUED | OUTPATIENT
Start: 2019-01-28 | End: 2019-01-28 | Stop reason: HOSPADM

## 2019-01-28 RX ORDER — MORPHINE SULFATE 4 MG/ML
2 INJECTION, SOLUTION INTRAMUSCULAR; INTRAVENOUS EVERY 10 MIN PRN
Status: DISCONTINUED | OUTPATIENT
Start: 2019-01-28 | End: 2019-01-28 | Stop reason: HOSPADM

## 2019-01-28 RX ADMIN — HYDRALAZINE HYDROCHLORIDE 10 MG: 20 INJECTION INTRAMUSCULAR; INTRAVENOUS at 15:58:00

## 2019-01-28 RX ADMIN — CEFAZOLIN SODIUM/WATER 2 G: 2 G/20 ML SYRINGE (ML) INTRAVENOUS at 14:38:00

## 2019-01-28 RX ADMIN — GLYCOPYRROLATE 0.8 MG: 0.2 INJECTION INTRAMUSCULAR; INTRAVENOUS at 15:53:00

## 2019-01-28 RX ADMIN — SODIUM CHLORIDE, SODIUM LACTATE, POTASSIUM CHLORIDE, CALCIUM CHLORIDE: 600; 310; 30; 20 INJECTION, SOLUTION INTRAVENOUS at 15:50:00

## 2019-01-28 RX ADMIN — GLYCOPYRROLATE 0.2 MG: 0.2 INJECTION INTRAMUSCULAR; INTRAVENOUS at 14:30:00

## 2019-01-28 RX ADMIN — ROCURONIUM BROMIDE 5 MG: 10 INJECTION, SOLUTION INTRAVENOUS at 14:22:00

## 2019-01-28 RX ADMIN — EPHEDRINE SULFATE 10 MG: 50 INJECTION, SOLUTION INTRAVENOUS at 15:23:00

## 2019-01-28 RX ADMIN — SODIUM CHLORIDE, SODIUM LACTATE, POTASSIUM CHLORIDE, CALCIUM CHLORIDE: 600; 310; 30; 20 INJECTION, SOLUTION INTRAVENOUS at 16:07:00

## 2019-01-28 RX ADMIN — SODIUM CHLORIDE, SODIUM LACTATE, POTASSIUM CHLORIDE, CALCIUM CHLORIDE: 600; 310; 30; 20 INJECTION, SOLUTION INTRAVENOUS at 15:47:00

## 2019-01-28 RX ADMIN — MIDAZOLAM HYDROCHLORIDE 2 MG: 1 INJECTION INTRAMUSCULAR; INTRAVENOUS at 14:20:00

## 2019-01-28 RX ADMIN — NEOSTIGMINE METHYLSULFATE 4 MG: 0.5 INJECTION INTRAVENOUS at 15:53:00

## 2019-01-28 RX ADMIN — EPHEDRINE SULFATE 10 MG: 50 INJECTION, SOLUTION INTRAVENOUS at 15:29:00

## 2019-01-28 RX ADMIN — LIDOCAINE HYDROCHLORIDE 50 MG: 10 INJECTION, SOLUTION EPIDURAL; INFILTRATION; INTRACAUDAL; PERINEURAL at 14:22:00

## 2019-01-28 RX ADMIN — DEXAMETHASONE SODIUM PHOSPHATE 4 MG: 4 MG/ML VIAL (ML) INJECTION at 14:25:00

## 2019-01-28 RX ADMIN — EPHEDRINE SULFATE 10 MG: 50 INJECTION, SOLUTION INTRAVENOUS at 14:33:00

## 2019-01-28 RX ADMIN — SODIUM CHLORIDE, SODIUM LACTATE, POTASSIUM CHLORIDE, CALCIUM CHLORIDE: 600; 310; 30; 20 INJECTION, SOLUTION INTRAVENOUS at 14:20:00

## 2019-01-28 RX ADMIN — SODIUM CHLORIDE, SODIUM LACTATE, POTASSIUM CHLORIDE, CALCIUM CHLORIDE: 600; 310; 30; 20 INJECTION, SOLUTION INTRAVENOUS at 15:46:00

## 2019-01-28 NOTE — ANESTHESIA POSTPROCEDURE EVALUATION
Patient: Joellen Irene    Procedure Summary     Date:  01/28/19 Room / Location:  [facility] OR 01 / [facility] OR    Anesthesia Start:  7383 Anesthesia Stop:  8556    Procedure:  BARIATRIC GASTRIC BYPASS LAPAROSCOPIC DIANN-EN-Y (N/A Abdomen) Diagnosis:  (

## 2019-01-28 NOTE — PROGRESS NOTES
[facility]    Progress Note    Mati Brambila Patient Status:  Surgery Admit    1955 MRN [id number]   Location [address] UNIT Attending Kim العلي MD   Hosp Day # 0 PCP Ly Wang DO without long-term current use of insulin ([facility]  MONITOR ACCU CHECKS.              Results:     Lab Results   Component Value Date    WBC 6.0 03/22/2018    HGB 16.0 03/22/2018    HCT 47.1 03/22/2018     (L) 03/22/2018    CREATSERUM 1.09 03/22/2018

## 2019-01-28 NOTE — H&P
Note from 12/27 for full details. 59yo male here for elective bariatric surgery - RYGB. No significant changes since last visit. Tolerated liquid diet well. Understands ERAS / change in protocol.     To OR for lap RYGB

## 2019-01-28 NOTE — OPERATIVE REPORT
Min Carrillo / Esmer Chu / Luis Manuel Leon / Bell Wall / Corinna Vilchis / Nelia Castillo - [phone]  Answering Service [phone]        Date: 1/28/19  Preop Diagnosis: Morbid Obesity secondary to excess calories   Postop Delisa antibiotics and subcutaneous heparin were given. The patient was wrapped and draped in a sterile fashion.   An Optiview technique was used to insert an 11 mm left paramedian trocar and the abdomen was insufflated and the patient tolerated this well per the stapler. An articulating maryland grasper was guided through this gastrotomy and an opening was made to guide the suture through the anterior wall of the stomach. The silk suture was grasped and the anvil was then brought out through the gastrotomy.   We the Andres limb with an atraumatic grasper. The endoscope was removed. Claudette [place] liver retractor was then removed under direct vision.   A left lateral trocar site fascia was closed with a figure of eight #1 PDS sutures passed with a Royer-Jonathan suture

## 2019-01-28 NOTE — ANESTHESIA PREPROCEDURE EVALUATION
Anesthesia PreOp Note    HPI:     Berta Mcqueen is a 61year old male who presents for preoperative consultation requested by: Lyle Joy MD    Date of Surgery: 1/28/2019    Procedure(s):  BARIATRIC GASTRIC BYPASS LAPAROSCOPIC DIANN-EN-Y  Indication eyeglasses       Past Surgical History:   Procedure Laterality Date   • APPENDECTOMY     • COLONOSCOPY     • COLONOSCOPY N/A 9/26/2018    Performed by Luis Fernando Benavidez MD at [facility] ENDOSCOPY   • ESOPHAGOGASTRODUODENOSCOPY (EGD) N/A 9/26/2018    Performed by Modesta Easton Subcutaneous Once Char Rehman MD   ceFAZolin sodium (ANCEF/KEFZOL) 2 GM/20ML premix IV syringe 2 g 2 g Intravenous Once Char Rehman MD     No current [facility]-ordered outpatient medications on file.     No Known Allergies    Family History   Problem [employer]    (+) implants    Pulmonary - normal exam   Cardiovascular - normal exam    Neuro/Psych - negative ROS     GI/Hepatic/Renal      Endo/Other    Abdominal              Anesthesia Plan:   ASA:  3  Plan:   General  Post-op Pain Management: IV analgesi

## 2019-01-28 NOTE — ANESTHESIA PROCEDURE NOTES
ANESTHESIA INTUBATION  Date/Time: 1/28/2019 2:22 AM  Urgency: elective    Difficult airway    General Information and Staff    Patient location during procedure: OR  Anesthesiologist: Nataly Gamble DO  Resident/CRNA: Berto Mark CRNA  Performed: Brandy Thomason

## 2019-01-29 VITALS
SYSTOLIC BLOOD PRESSURE: 117 MMHG | BODY MASS INDEX: 37.51 KG/M2 | HEIGHT: 67 IN | OXYGEN SATURATION: 94 % | DIASTOLIC BLOOD PRESSURE: 65 MMHG | HEART RATE: 53 BPM | WEIGHT: 239 LBS | RESPIRATION RATE: 20 BRPM | TEMPERATURE: 98 F

## 2019-01-29 LAB
ANION GAP SERPL CALC-SCNC: 9 MMOL/L (ref 0–18)
BASOPHILS # BLD: 0 K/UL (ref 0–0.2)
BASOPHILS NFR BLD: 0 %
BUN SERPL-MCNC: 8 MG/DL (ref 8–20)
BUN/CREAT SERPL: 8.4 (ref 10–20)
CALCIUM SERPL-MCNC: 8.1 MG/DL (ref 8.5–10.5)
CHLORIDE SERPL-SCNC: 101 MMOL/L (ref 95–110)
CO2 SERPL-SCNC: 24 MMOL/L (ref 22–32)
CREAT SERPL-MCNC: 0.95 MG/DL (ref 0.5–1.5)
EOSINOPHIL # BLD: 0 K/UL (ref 0–0.7)
EOSINOPHIL NFR BLD: 0 %
ERYTHROCYTE [DISTWIDTH] IN BLOOD BY AUTOMATED COUNT: 14.4 % (ref 11–15)
GLUCOSE BLDC GLUCOMTR-MCNC: 97 MG/DL (ref 70–99)
GLUCOSE SERPL-MCNC: 100 MG/DL (ref 70–99)
HCT VFR BLD AUTO: 44.7 % (ref 41–52)
HGB BLD-MCNC: 14.7 G/DL (ref 13.5–17.5)
LYMPHOCYTES # BLD: 1.1 K/UL (ref 1–4)
LYMPHOCYTES NFR BLD: 12 %
MCH RBC QN AUTO: 29.8 PG (ref 27–32)
MCHC RBC AUTO-ENTMCNC: 32.9 G/DL (ref 32–37)
MCV RBC AUTO: 90.4 FL (ref 80–100)
MONOCYTES # BLD: 1.1 K/UL (ref 0–1)
MONOCYTES NFR BLD: 11 %
NEUTROPHILS # BLD AUTO: 7.3 K/UL (ref 1.8–7.7)
NEUTROPHILS NFR BLD: 77 %
OSMOLALITY UR CALC.SUM OF ELEC: 276 MOSM/KG (ref 275–295)
PLATELET # BLD AUTO: 103 K/UL (ref 140–400)
PMV BLD AUTO: 10.2 FL (ref 7.4–10.3)
POTASSIUM SERPL-SCNC: 3.6 MMOL/L (ref 3.3–5.1)
RBC # BLD AUTO: 4.95 M/UL (ref 4.5–5.9)
SODIUM SERPL-SCNC: 134 MMOL/L (ref 136–144)
WBC # BLD AUTO: 9.5 K/UL (ref 4–11)

## 2019-01-29 PROCEDURE — 99238 HOSP IP/OBS DSCHRG MGMT 30/<: CPT | Performed by: HOSPITALIST

## 2019-01-29 RX ORDER — PANTOPRAZOLE SODIUM 40 MG/1
40 TABLET, DELAYED RELEASE ORAL
Qty: 30 TABLET | Refills: 0 | Status: SHIPPED | OUTPATIENT
Start: 2019-01-30 | End: 2019-02-27

## 2019-01-29 NOTE — PROGRESS NOTES
Bariatric Inpatient Nutrition Note      Ashley Husain is a 61year old male.     Procedure: Laparoscopic gastric bypass surgery  Surgery Date: 1.28.19  Medical Diagnosis: Morbid obesity    Height:  Ht Readings from Last 1 Encounters:  01/28/19 : 5' [COMPLETED] ceFAZolin sodium (ANCEF/KEFZOL) 2 GM/20ML premix IV syringe 2 g 2 g Intravenous Once   [COMPLETED] ondansetron HCl (ZOFRAN) injection 4 mg 4 mg Intravenous Once PRN   [COMPLETED] Prochlorperazine Edisylate (COMPAZINE) injection 5 mg 5 mg Intr Intravenous Q8H          Recommendations/Goals: Reviewed bariatric diet stages, fluid intake goals and chart, Begin bariatric diet stage 2 on Thursday, until then do clear protein shakes, Follow discharge instructions , Progress through bariatric diet stag

## 2019-01-29 NOTE — PROGRESS NOTES
Liliane Burch / Devin Palmer / Padmini Perez / Chicho Batista / Joshua Chisholm / Danni [place] - [phone]  Answering Service [phone]      Progress Note    Ju Connell Patient Status:  Inpatient    1955 MRN [id number] 50 mg Oral Daily Beta Blocker   rOPINIRole HCl (REQUIP) tab 0.5 mg 0.5 mg Oral Nightly   Alfuzosin HCl ER (UROXATRAL) 24 hr tab 10 mg 10 mg Oral Daily with dinner   dextrose 50 % injection 50 mL 50 mL Intravenous PRN   Glucose-Vitamin C (DEX-4) 4-6 GM-MG c

## 2019-01-29 NOTE — PAYOR COMM NOTE
--------------  ADMISSION REVIEW     [facility]  Subscriber #:  [id number]  Authorization Number: [id number]    Admit date: 1/28/19  Admit time: 80       Admitting Physician: Katharina Husain MD  Attending Physician:  Mariana Cleveland MD  Primary Car sodium (ANCEF/KEFZOL) 2 GM/20ML premix IV syringe 2 g     Date Action Dose Route User    1/28/2019 2224 Given 2 g Intravenous Chaz Spence RN      dexamethasone Sodium Phosphate (DECADRON) 4 MG/ML injection     Date Action Dose Route User    1/28/2019 Dose Route User    1/29/2019 0558 Given 30 mg Intravenous Jean Izquierdo RN    1/29/2019 0024 Given 30 mg Intravenous Jean Izquierdo RN    1/28/2019 1807 Given 30 mg Intravenous Cecilia Bardales RN      lactated ringers infusion     Date Action Dose Route chloride (ANECTINE) injection     Date Action Dose Route User    1/28/2019 1422 Given 140 mg Intravenous Zahra Mcnally CRNA        [facility] / Dick Peters / Myra Vasquez / Patricio Meigs / Felicity Acosta / Dave Chan - Yuridia and agrees to proceed.     Operative Summary: The patient was brought to the operating room and placed under general anesthesia. Perioperative antibiotics and subcutaneous heparin were given. The patient was wrapped and draped in a sterile fashion.   Monica RON gastrotomy was made along the anterolateral of the stomach close to the greater curve. An 0-silk suture was placed on the anvil of the EEA stapler.   An articulating maryland grasper was guided through this gastrotomy and an opening was made to guide the s along the staple line. The anastomosis was then insufflated under irrigated saline, and there is no evidence of any leak after we occluded the Andres limb with an atraumatic grasper. The endoscope was removed.   Alex Landers liver retractor was then removed un

## 2019-01-29 NOTE — DISCHARGE SUMMARY
Discharge Instructions    Saw patient in hospital to discuss discharge care. Covered the postoperative gastric bypass discharge instructions. *Covered fluid intake of 64 oz/day and to call if not meeting at 30-40 oz/day.   *Monitor incision for any redn

## 2019-01-29 NOTE — DISCHARGE SUMMARY
[facility]    Discharge Summary    [address] [facility] Patient Status:  Inpatient    1955 MRN [id number]   Location [facility] 4W/SW/SE Attending Ann-Marie Mitchell MD   Hosp Day # 1 PCP Christin Rendon DO     Date of Admission: 1 ACCU CHECKS.          Complications: none     Consultants     Provider Role Specialty    Ramana Cristobal MD Consulting Physician  HOSPITALIST        Surgical Procedures     Case IDs Date Procedure Surgeon Location Status    [id number] 1/28/19 BARIATRIC GASTRIC 1/30/2019  What changed:    · medication strength  · how much to take      Take 1 tablet (40 mg total) by mouth every morning before breakfast.   Quantity:  30 tablet  Refills:  0        CONTINUE taking these medications      Instructions Prescription deta Medicine  Contact information:  [address] [facility]  [phone]                   Follow up Labs and imaging:          Other Discharge Instructions:       Discharge Instructions for Bariatric Surgery    You have had a procedure ounces of water per day. Some signs of dehydration include dry mouth and dark urine. · Liquids should be avoided for a period of 15 minutes before, and 30-60 minutes after eating solid food or meals. · Eat slowly.  Plan on taking at least 20-30 minutes to exercises you learned in the hospital.  · Shower as needed. Avoid baths, swimming pools, and hot tubs for 1 to 2 weeks after going home. This helps prevent infection at the incision site. · Keep the incision clean and dry.  Wash the incision gently with a

## 2019-02-01 ENCOUNTER — TELEPHONE (OUTPATIENT)
Dept: SURGERY | Facility: CLINIC | Age: 64
End: 2019-02-01

## 2019-02-05 ENCOUNTER — TELEPHONE (OUTPATIENT)
Dept: SURGERY | Facility: CLINIC | Age: 64
End: 2019-02-05

## 2019-02-05 NOTE — TELEPHONE ENCOUNTER
2/4/19 @ 11:40am Spoke to Turner at [facility], [phone], SZO#[phone].  She verified that patient has following benefits for Bariatric services:   • EHV ([facility]#[id number]) DX E66.9   - Dietitian ([id number]) – No  - Body Composition (URR9188I)- NO.   •

## 2019-02-07 ENCOUNTER — OFFICE VISIT (OUTPATIENT)
Dept: SURGERY | Facility: CLINIC | Age: 64
End: 2019-02-07
Payer: COMMERCIAL

## 2019-02-07 VITALS
WEIGHT: 233.31 LBS | BODY MASS INDEX: 35.77 KG/M2 | HEART RATE: 62 BPM | SYSTOLIC BLOOD PRESSURE: 124 MMHG | DIASTOLIC BLOOD PRESSURE: 80 MMHG | HEIGHT: 67.7 IN | RESPIRATION RATE: 16 BRPM

## 2019-02-07 DIAGNOSIS — E66.01 MORBID OBESITY WITH BMI OF 40.0-44.9, ADULT (HCC): Primary | ICD-10-CM

## 2019-02-07 DIAGNOSIS — Z98.84 GASTRIC BYPASS STATUS FOR OBESITY: ICD-10-CM

## 2019-02-07 NOTE — PROGRESS NOTES
[address], [address]  Dept: [phone]    2/7/2019    BARIATRIC EXISTING PATIENT/FOLLOW UP    HPI:    Preop weight 287.9      44.2  DOS: 1/28/19 RYGB  2/7 to slowly increase physical activity as tolerated  Should start multivitamins - hasn't gotten chewables - recommended optisource or bariatric choice. Followup with bariatrician, dietitian - has appts scheduled     Should followup w me in 1 month.    Pos

## 2019-02-16 RX ORDER — ROPINIROLE 0.25 MG/1
TABLET, FILM COATED ORAL
Qty: 60 TABLET | Refills: 0 | Status: SHIPPED | OUTPATIENT
Start: 2019-02-16 | End: 2019-03-18

## 2019-02-21 ENCOUNTER — OFFICE VISIT (OUTPATIENT)
Dept: SURGERY | Facility: CLINIC | Age: 64
End: 2019-02-21
Payer: COMMERCIAL

## 2019-02-21 VITALS — BODY MASS INDEX: 35.47 KG/M2 | HEIGHT: 67 IN | WEIGHT: 226 LBS

## 2019-02-21 DIAGNOSIS — E66.09 CLASS 2 OBESITY DUE TO EXCESS CALORIES WITH BODY MASS INDEX (BMI) OF 35.0 TO 35.9 IN ADULT, UNSPECIFIED WHETHER SERIOUS COMORBIDITY PRESENT: Primary | ICD-10-CM

## 2019-02-21 PROCEDURE — 97803 MED NUTRITION INDIV SUBSEQ: CPT | Performed by: DIETITIAN, REGISTERED

## 2019-02-21 NOTE — PROGRESS NOTES
[address]  [address] [address]  Dept: [phone]  Loc: [phone]    02/21/19      Bariatric Follow-up Nutrition Session    Sebastien Sparks is a 61year old male.      As Vitamins/Minerals:  Lab Results   Component Value Date    B12 541 07/21/2018     Lab Results   Component Value Date    YLNQ57RP 27.1 03/22/2018     Lab Results   Component Value Date/Time    THIAMINE 236 (H) 07/21/2018 08:08 AM      No results found Breakfast      AM Snack       Lunch     PM Snack     Dinner   2 scrambled eggs with cheese  Clear Protein shake, Muscle milk  1/2 boca burger with 1/4 mashed potatoes, home-made gravy     Total Calories:  400-600 cals  Excessive in: nothing  Inadequate in:

## 2019-02-25 ENCOUNTER — OFFICE VISIT (OUTPATIENT)
Dept: SURGERY | Facility: CLINIC | Age: 64
End: 2019-02-25
Payer: COMMERCIAL

## 2019-02-25 VITALS
HEIGHT: 67 IN | HEART RATE: 76 BPM | BODY MASS INDEX: 35.16 KG/M2 | WEIGHT: 224 LBS | OXYGEN SATURATION: 97 % | SYSTOLIC BLOOD PRESSURE: 121 MMHG | DIASTOLIC BLOOD PRESSURE: 84 MMHG | RESPIRATION RATE: 16 BRPM

## 2019-02-25 DIAGNOSIS — G47.33 OSA ON CPAP: ICD-10-CM

## 2019-02-25 DIAGNOSIS — E55.9 VITAMIN D DEFICIENCY: ICD-10-CM

## 2019-02-25 DIAGNOSIS — E66.9 OBESITY (BMI 30-39.9): ICD-10-CM

## 2019-02-25 DIAGNOSIS — Z99.89 OSA ON CPAP: ICD-10-CM

## 2019-02-25 DIAGNOSIS — R73.09 ABNORMAL BLOOD SUGAR: ICD-10-CM

## 2019-02-25 DIAGNOSIS — I10 ESSENTIAL HYPERTENSION: Primary | ICD-10-CM

## 2019-02-25 DIAGNOSIS — Z98.84 H/O GASTRIC BYPASS: ICD-10-CM

## 2019-02-25 DIAGNOSIS — E53.8 LOW VITAMIN B12 LEVEL: ICD-10-CM

## 2019-02-25 PROCEDURE — 99214 OFFICE O/P EST MOD 30 MIN: CPT | Performed by: INTERNAL MEDICINE

## 2019-02-25 RX ORDER — PANTOPRAZOLE SODIUM 20 MG/1
TABLET, DELAYED RELEASE ORAL
Refills: 2 | COMMUNITY
Start: 2019-02-15 | End: 2019-02-25 | Stop reason: DRUGHIGH

## 2019-02-25 NOTE — PROGRESS NOTES
[address] [place] [address]  Dept: [phone]    Date: 2019    Patient:  Carrol Draper  :      1955  MRN:      [id number]    Referring Provider: ALPRAZolam 1 MG Oral Tab, Take 1 mg by mouth 4 (four) times daily. , Disp: , Rfl:   •  tamsulosin HCl 0.4 MG Oral Cap, Take by mouth daily. , Disp: , Rfl:   •  FLUoxetine HCl (PROZAC) 20 MG Oral Cap,  , Disp: , Rfl: 0  •  PSEUDOEPHEDRINE HCL OR, Take  by m tenderness.   Lungs: clear to auscultation bilaterally  Heart: S1, S2 normal, no murmur, click, rub or gallop, regular rate and rhythm  Abdomen: soft, non-tender; bowel sounds normal; no masses,  no organomegaly  Extremities: extremities normal, atraumatic, Standing Status: Future          Standing Expiration Date: 2/25/2020      Lipid Panel          Standing Status: Future          Standing Expiration Date: 2/25/2020      Thyroid Stim Hormone, assay          Standing Status: Future          Standing Expi

## 2019-02-27 RX ORDER — PANTOPRAZOLE SODIUM 40 MG/1
TABLET, DELAYED RELEASE ORAL
Qty: 30 TABLET | Refills: 0 | Status: SHIPPED | OUTPATIENT
Start: 2019-02-27 | End: 2019-04-17

## 2019-02-28 VITALS
HEIGHT: 67 IN | BODY MASS INDEX: 44.42 KG/M2 | DIASTOLIC BLOOD PRESSURE: 70 MMHG | OXYGEN SATURATION: 98 % | WEIGHT: 283 LBS | SYSTOLIC BLOOD PRESSURE: 124 MMHG | HEART RATE: 67 BPM

## 2019-03-06 ENCOUNTER — LAB ENCOUNTER (OUTPATIENT)
Dept: LAB | Facility: HOSPITAL | Age: 64
End: 2019-03-06
Attending: INTERNAL MEDICINE
Payer: COMMERCIAL

## 2019-03-06 DIAGNOSIS — Z98.84 H/O GASTRIC BYPASS: ICD-10-CM

## 2019-03-06 DIAGNOSIS — E55.9 VITAMIN D DEFICIENCY: ICD-10-CM

## 2019-03-06 DIAGNOSIS — E53.8 LOW VITAMIN B12 LEVEL: ICD-10-CM

## 2019-03-06 DIAGNOSIS — G47.33 OSA ON CPAP: ICD-10-CM

## 2019-03-06 DIAGNOSIS — E66.9 OBESITY (BMI 30-39.9): ICD-10-CM

## 2019-03-06 DIAGNOSIS — I10 ESSENTIAL HYPERTENSION: ICD-10-CM

## 2019-03-06 DIAGNOSIS — R73.09 ABNORMAL BLOOD SUGAR: ICD-10-CM

## 2019-03-06 DIAGNOSIS — Z99.89 OSA ON CPAP: ICD-10-CM

## 2019-03-06 LAB
25(OH)D3 SERPL-MCNC: 48.1 NG/ML (ref 30–100)
ALBUMIN SERPL-MCNC: 3.9 G/DL (ref 3.4–5)
ALBUMIN/GLOB SERPL: 1.1 {RATIO} (ref 1–2)
ALP LIVER SERPL-CCNC: 81 U/L (ref 45–117)
ALT SERPL-CCNC: 29 U/L (ref 16–61)
ANION GAP SERPL CALC-SCNC: 6 MMOL/L (ref 0–18)
AST SERPL-CCNC: 23 U/L (ref 15–37)
BASOPHILS # BLD AUTO: 0.03 X10(3) UL (ref 0–0.2)
BASOPHILS NFR BLD AUTO: 0.6 %
BILIRUB SERPL-MCNC: 0.6 MG/DL (ref 0.1–2)
BUN BLD-MCNC: 14 MG/DL (ref 7–18)
BUN/CREAT SERPL: 13.7 (ref 10–20)
CALCIUM BLD-MCNC: 9.2 MG/DL (ref 8.5–10.1)
CHLORIDE SERPL-SCNC: 105 MMOL/L (ref 98–107)
CHOLEST SMN-MCNC: 109 MG/DL (ref ?–200)
CO2 SERPL-SCNC: 29 MMOL/L (ref 21–32)
CREAT BLD-MCNC: 1.02 MG/DL (ref 0.7–1.3)
DEPRECATED RDW RBC AUTO: 46.5 FL (ref 35.1–46.3)
EOSINOPHIL # BLD AUTO: 0.09 X10(3) UL (ref 0–0.7)
EOSINOPHIL NFR BLD AUTO: 1.9 %
ERYTHROCYTE [DISTWIDTH] IN BLOOD BY AUTOMATED COUNT: 13.9 % (ref 11–15)
EST. AVERAGE GLUCOSE BLD GHB EST-MCNC: 103 MG/DL (ref 68–126)
GLOBULIN PLAS-MCNC: 3.6 G/DL (ref 2.8–4.4)
GLUCOSE BLD-MCNC: 85 MG/DL (ref 70–99)
HBA1C MFR BLD HPLC: 5.2 % (ref ?–5.7)
HCT VFR BLD AUTO: 49.8 % (ref 39–53)
HDLC SERPL-MCNC: 34 MG/DL (ref 40–59)
HGB BLD-MCNC: 15.9 G/DL (ref 13–17.5)
IMM GRANULOCYTES # BLD AUTO: 0.01 X10(3) UL (ref 0–1)
IMM GRANULOCYTES NFR BLD: 0.2 %
LDLC SERPL CALC-MCNC: 59 MG/DL (ref ?–100)
LYMPHOCYTES # BLD AUTO: 0.96 X10(3) UL (ref 1–4)
LYMPHOCYTES NFR BLD AUTO: 20 %
M PROTEIN MFR SERPL ELPH: 7.5 G/DL (ref 6.4–8.2)
MCH RBC QN AUTO: 29.1 PG (ref 26–34)
MCHC RBC AUTO-ENTMCNC: 31.9 G/DL (ref 31–37)
MCV RBC AUTO: 91.2 FL (ref 80–100)
MONOCYTES # BLD AUTO: 0.46 X10(3) UL (ref 0.1–1)
MONOCYTES NFR BLD AUTO: 9.6 %
NEUTROPHILS # BLD AUTO: 3.25 X10 (3) UL (ref 1.5–7.7)
NEUTROPHILS # BLD AUTO: 3.25 X10(3) UL (ref 1.5–7.7)
NEUTROPHILS NFR BLD AUTO: 67.7 %
NONHDLC SERPL-MCNC: 75 MG/DL (ref ?–130)
OSMOLALITY SERPL CALC.SUM OF ELEC: 290 MOSM/KG (ref 275–295)
PLATELET # BLD AUTO: 118 10(3)UL (ref 150–450)
POTASSIUM SERPL-SCNC: 4.1 MMOL/L (ref 3.5–5.1)
RBC # BLD AUTO: 5.46 X10(6)UL (ref 4.3–5.7)
SODIUM SERPL-SCNC: 140 MMOL/L (ref 136–145)
TRIGL SERPL-MCNC: 79 MG/DL (ref 30–149)
TSI SER-ACNC: 2.79 MIU/ML (ref 0.36–3.74)
VIT B12 SERPL-MCNC: 1233 PG/ML (ref 193–986)
VLDLC SERPL CALC-MCNC: 16 MG/DL (ref 0–30)
WBC # BLD AUTO: 4.8 X10(3) UL (ref 4–11)

## 2019-03-06 PROCEDURE — 85025 COMPLETE CBC W/AUTO DIFF WBC: CPT

## 2019-03-06 PROCEDURE — 36415 COLL VENOUS BLD VENIPUNCTURE: CPT

## 2019-03-06 PROCEDURE — 83036 HEMOGLOBIN GLYCOSYLATED A1C: CPT

## 2019-03-06 PROCEDURE — 84425 ASSAY OF VITAMIN B-1: CPT

## 2019-03-06 PROCEDURE — 80053 COMPREHEN METABOLIC PANEL: CPT

## 2019-03-06 PROCEDURE — 84443 ASSAY THYROID STIM HORMONE: CPT

## 2019-03-06 PROCEDURE — 82306 VITAMIN D 25 HYDROXY: CPT

## 2019-03-06 PROCEDURE — 82607 VITAMIN B-12: CPT

## 2019-03-06 PROCEDURE — 80061 LIPID PANEL: CPT

## 2019-03-09 LAB — VITAMIN B1 (THIAMINE), WHOLE B: 183 NMOL/L

## 2019-03-18 RX ORDER — BUPROPION HYDROCHLORIDE 100 MG/1
TABLET ORAL
Qty: 90 TABLET | Refills: 0 | Status: SHIPPED | OUTPATIENT
Start: 2019-03-18 | End: 2019-04-15

## 2019-03-19 RX ORDER — ROPINIROLE 0.25 MG/1
TABLET, FILM COATED ORAL
Qty: 60 TABLET | Refills: 0 | Status: SHIPPED | OUTPATIENT
Start: 2019-03-19 | End: 2019-04-15

## 2019-03-29 RX ORDER — METOPROLOL SUCCINATE 50 MG/1
TABLET, EXTENDED RELEASE ORAL
Qty: 90 TABLET | Refills: 0 | Status: SHIPPED | OUTPATIENT
Start: 2019-03-29 | End: 2019-04-25 | Stop reason: DRUGHIGH

## 2019-04-03 ENCOUNTER — OFFICE VISIT (OUTPATIENT)
Dept: FAMILY MEDICINE CLINIC | Facility: CLINIC | Age: 64
End: 2019-04-03
Payer: COMMERCIAL

## 2019-04-03 VITALS
BODY MASS INDEX: 34 KG/M2 | TEMPERATURE: 98 F | DIASTOLIC BLOOD PRESSURE: 70 MMHG | WEIGHT: 215 LBS | RESPIRATION RATE: 16 BRPM | HEART RATE: 52 BPM | SYSTOLIC BLOOD PRESSURE: 118 MMHG

## 2019-04-03 DIAGNOSIS — I10 ESSENTIAL HYPERTENSION: Primary | ICD-10-CM

## 2019-04-03 PROCEDURE — 99212 OFFICE O/P EST SF 10 MIN: CPT | Performed by: FAMILY MEDICINE

## 2019-04-03 PROCEDURE — 99213 OFFICE O/P EST LOW 20 MIN: CPT | Performed by: FAMILY MEDICINE

## 2019-04-03 RX ORDER — METOPROLOL SUCCINATE 25 MG/1
25 TABLET, EXTENDED RELEASE ORAL DAILY
Qty: 30 TABLET | Refills: 1 | Status: SHIPPED | OUTPATIENT
Start: 2019-04-03 | End: 2019-06-18

## 2019-04-03 NOTE — PROGRESS NOTES
HPI: Kristen Marques is a 61year old male who presents for follow-up HTN. Had gastric bypass on 1/28/19 with Dr. Letty Costa. Eating normal diet. Normal bowel movements. Off Metformin. Has decreased Metoprolol ER to 25mg daily.   Denies chest pain, SOB, palpitations, onelia take 2 tablet (60MG)  by oral route  every 6 hours as needed Disp:  Rfl:      No current facility-administered medications on file prior to visit. Tobacco Use: no     ROS: see HPI    Objective:   Gen: AOx3. NAD.    /70   Pulse 52   Temp 98.3 °F (36

## 2019-04-15 RX ORDER — BUPROPION HYDROCHLORIDE 100 MG/1
TABLET ORAL
Qty: 90 TABLET | Refills: 0 | Status: SHIPPED | OUTPATIENT
Start: 2019-04-15 | End: 2019-05-08

## 2019-04-16 RX ORDER — ROPINIROLE 0.25 MG/1
TABLET, FILM COATED ORAL
Qty: 180 TABLET | Refills: 1 | Status: SHIPPED | OUTPATIENT
Start: 2019-04-16 | End: 2019-10-11

## 2019-04-17 RX ORDER — PANTOPRAZOLE SODIUM 20 MG/1
TABLET, DELAYED RELEASE ORAL
Qty: 30 TABLET | Refills: 0 | Status: SHIPPED | OUTPATIENT
Start: 2019-04-17 | End: 2019-06-19 | Stop reason: ALTCHOICE

## 2019-04-17 NOTE — TELEPHONE ENCOUNTER
Refill passed per [facility], [facility] protocol.   Refill Protocol Appointment Criteria  · Appointment scheduled in the past 6 months or in the next 3 months  Recent Outpatient Visits            1 week ago Essential hypertension    [facility], [facility], [address],

## 2019-04-23 ENCOUNTER — TELEPHONE (OUTPATIENT)
Dept: OTHER | Age: 64
End: 2019-04-23

## 2019-04-23 DIAGNOSIS — E11.9 TYPE 2 DIABETES MELLITUS WITHOUT COMPLICATION, WITHOUT LONG-TERM CURRENT USE OF INSULIN (HCC): Primary | ICD-10-CM

## 2019-04-23 NOTE — TELEPHONE ENCOUNTER
Spoke with patient ( verified) and reports, \"The nephrology testing keeps popping up on MyChart. I just did a bunch of labs ordered by Dr. Mat Cárdenas told me to get this addressed by my primary care doctor. I don't even know what testing I need. \"    W

## 2019-04-23 NOTE — TELEPHONE ENCOUNTER
Pt returning call. Call was disconnected before this RN could speak with pt. Attempted to call back an no answer. Please see message below to discuss when pt calls back.

## 2019-04-23 NOTE — TELEPHONE ENCOUNTER
LMTCB  Transfer to triage. I also sent a mychart message on why he needs to testing. At the last office visit on 4/3/19  Was not addressed. From: Adriane Preston  To:  Jean-Claude Mejia DO  Sent: 4/22/2019 11:01 PM CDT  Subject: Non-Urgent Medic

## 2019-04-23 NOTE — TELEPHONE ENCOUNTER
Pt calling back and informed of Dr. Dior Santiago advice re urine sample. Pt voiced understanding and agrees. Also states he has not seen an Optho.

## 2019-04-23 NOTE — TELEPHONE ENCOUNTER
The microalbumin/creatinine ratio was ordered. It's a urine sample. He can come in at any time. I do not see an Optho visit. Does he see an ophtho?   If so, please enter the date of his last dilated eye exam.      The PSA is not recommended if he d

## 2019-04-25 ENCOUNTER — OFFICE VISIT (OUTPATIENT)
Dept: SURGERY | Facility: CLINIC | Age: 64
End: 2019-04-25
Payer: COMMERCIAL

## 2019-04-25 VITALS
HEIGHT: 67.7 IN | WEIGHT: 211.63 LBS | OXYGEN SATURATION: 98 % | BODY MASS INDEX: 32.45 KG/M2 | SYSTOLIC BLOOD PRESSURE: 129 MMHG | HEART RATE: 72 BPM | RESPIRATION RATE: 16 BRPM | DIASTOLIC BLOOD PRESSURE: 80 MMHG

## 2019-04-25 DIAGNOSIS — Z98.84 GASTRIC BYPASS STATUS FOR OBESITY: Primary | ICD-10-CM

## 2019-04-25 DIAGNOSIS — Z98.84 H/O GASTRIC BYPASS: ICD-10-CM

## 2019-04-25 NOTE — PROGRESS NOTES
[address]  Dept: [phone]    4/25/2019    BARIATRIC SURGERY PATIENT/FOLLOW UP    HPI:    Preop weight    287.9      44.2  DOS: 1/28/19 RYGB clear  Neck: no palpable masses, trachea midlien  Chest; clear BS b/l  CV: RR  Abd: incisions well healed, no erythema or cellulitis, no hernias, obese, nondistended, soft, nontender, no HSM    ASSESSMENT:  61yo male doing well 3 months s/p lap RYGB    Goo

## 2019-05-08 RX ORDER — BUPROPION HYDROCHLORIDE 100 MG/1
TABLET ORAL
Qty: 90 TABLET | Refills: 2 | Status: SHIPPED | OUTPATIENT
Start: 2019-05-08 | End: 2019-08-04

## 2019-06-17 ENCOUNTER — OFFICE VISIT (OUTPATIENT)
Dept: SURGERY | Facility: CLINIC | Age: 64
End: 2019-06-17
Payer: COMMERCIAL

## 2019-06-17 VITALS — WEIGHT: 199.13 LBS | BODY MASS INDEX: 31.25 KG/M2 | HEIGHT: 67 IN

## 2019-06-17 DIAGNOSIS — E66.09 CLASS 1 OBESITY DUE TO EXCESS CALORIES WITH SERIOUS COMORBIDITY AND BODY MASS INDEX (BMI) OF 31.0 TO 31.9 IN ADULT: Primary | ICD-10-CM

## 2019-06-17 PROCEDURE — 97803 MED NUTRITION INDIV SUBSEQ: CPT | Performed by: DIETITIAN, REGISTERED

## 2019-06-17 PROCEDURE — 0358T BIA WHOLE BODY: CPT | Performed by: DIETITIAN, REGISTERED

## 2019-06-17 NOTE — PROGRESS NOTES
[address]  [address] [address]  Dept: [phone]  Loc: [phone]    06/17/19      Bariatric Follow-up Nutrition Session    Sebatsien Sparks is a 61year old male.      As Marisa 75 03/06/2019    CALCNONHDL 95 08/20/2015        Vitamins/Minerals:  Lab Results   Component Value Date    B12 1,233 (H) 03/06/2019     Lab Results   Component Value Date    QMBB83EB 48.1 03/06/2019     Lab Results   Component Value Date/Time enchiladas, tuna, maybe some brussels sprouts) Coffee w/ coconut creamer, SF popsicle Same as lunch     +Pescatarian    Total Calories:  ~1200 kcal per day per Baritastic  Excessive in: nothing  Inadequate in:  fruits     Patient has made some modification labs    Annette Garcia MS RD LDN  Face-to-face time spent with pt: 45 minutes

## 2019-06-18 RX ORDER — METOPROLOL SUCCINATE 25 MG/1
TABLET, EXTENDED RELEASE ORAL
Qty: 90 TABLET | Refills: 1 | Status: SHIPPED | OUTPATIENT
Start: 2019-06-18 | End: 2019-12-23

## 2019-06-19 ENCOUNTER — OFFICE VISIT (OUTPATIENT)
Dept: SURGERY | Facility: CLINIC | Age: 64
End: 2019-06-19
Payer: COMMERCIAL

## 2019-06-19 VITALS
HEART RATE: 89 BPM | RESPIRATION RATE: 16 BRPM | SYSTOLIC BLOOD PRESSURE: 124 MMHG | HEIGHT: 67 IN | WEIGHT: 199 LBS | OXYGEN SATURATION: 97 % | BODY MASS INDEX: 31.23 KG/M2 | DIASTOLIC BLOOD PRESSURE: 79 MMHG

## 2019-06-19 DIAGNOSIS — Z99.89 OSA ON CPAP: ICD-10-CM

## 2019-06-19 DIAGNOSIS — I10 ESSENTIAL HYPERTENSION: Primary | ICD-10-CM

## 2019-06-19 DIAGNOSIS — G47.33 OSA ON CPAP: ICD-10-CM

## 2019-06-19 DIAGNOSIS — E66.9 OBESITY (BMI 30-39.9): ICD-10-CM

## 2019-06-19 DIAGNOSIS — Z98.84 H/O GASTRIC BYPASS: ICD-10-CM

## 2019-06-19 PROCEDURE — 99213 OFFICE O/P EST LOW 20 MIN: CPT | Performed by: INTERNAL MEDICINE

## 2019-06-19 NOTE — PROGRESS NOTES
[address], [address] [address]  Dept: [phone]    Date: 2019    Patient:  Sue Vu  :      1955  MRN:      [id number]    Referring Provider: History    Tobacco Use      Smoking status: Never Smoker      Smokeless tobacco: Never Used    Alcohol use: Yes      Comment: Beer & Wine Occasionally    Drug use: No    Surgical History:    Past Surgical History:   Procedure Laterality Date   • APPENDECTO fatigue  Respiratory: negative  Cardiovascular: negative  Gastrointestinal: negative  Musculoskeletal:positive for arthralgias and back pain  Neurological: negative  Behavioral/Psych: negative  Endocrine: negative  All other systems were reviewed and are n

## 2019-06-19 NOTE — TELEPHONE ENCOUNTER
Refill passed per [facility], [facility] protocol.   Hypertensive Medications  Protocol Criteria:  · Appointment scheduled in the past 6 months or in the next 3 months  · BMP or CMP in the past 12 months  · Creatinine result < 2  Recent Outpatient Visits

## 2019-07-10 ENCOUNTER — LAB ENCOUNTER (OUTPATIENT)
Dept: LAB | Facility: HOSPITAL | Age: 64
End: 2019-07-10
Attending: SURGERY
Payer: COMMERCIAL

## 2019-07-10 DIAGNOSIS — Z98.84 GASTRIC BYPASS STATUS FOR OBESITY: ICD-10-CM

## 2019-07-10 DIAGNOSIS — Z98.84 H/O GASTRIC BYPASS: ICD-10-CM

## 2019-07-10 LAB
25(OH)D3 SERPL-MCNC: 53.2 NG/ML (ref 30–100)
ALBUMIN SERPL-MCNC: 3.8 G/DL (ref 3.4–5)
ALBUMIN/GLOB SERPL: 1.2 {RATIO} (ref 1–2)
ALP LIVER SERPL-CCNC: 99 U/L (ref 45–117)
ALT SERPL-CCNC: 24 U/L (ref 16–61)
ANION GAP SERPL CALC-SCNC: 7 MMOL/L (ref 0–18)
AST SERPL-CCNC: 17 U/L (ref 15–37)
BASOPHILS # BLD AUTO: 0.01 X10(3) UL (ref 0–0.2)
BASOPHILS NFR BLD AUTO: 0.2 %
BILIRUB SERPL-MCNC: 0.6 MG/DL (ref 0.1–2)
BUN BLD-MCNC: 18 MG/DL (ref 7–18)
BUN/CREAT SERPL: 18.6 (ref 10–20)
CALCIUM BLD-MCNC: 9.1 MG/DL (ref 8.5–10.1)
CHLORIDE SERPL-SCNC: 107 MMOL/L (ref 98–112)
CO2 SERPL-SCNC: 29 MMOL/L (ref 21–32)
CREAT BLD-MCNC: 0.97 MG/DL (ref 0.7–1.3)
DEPRECATED RDW RBC AUTO: 46.3 FL (ref 35.1–46.3)
EOSINOPHIL # BLD AUTO: 0.11 X10(3) UL (ref 0–0.7)
EOSINOPHIL NFR BLD AUTO: 2.4 %
ERYTHROCYTE [DISTWIDTH] IN BLOOD BY AUTOMATED COUNT: 13.7 % (ref 11–15)
FOLATE SERPL-MCNC: >20 NG/ML (ref 8.7–?)
GLOBULIN PLAS-MCNC: 3.1 G/DL (ref 2.8–4.4)
GLUCOSE BLD-MCNC: 81 MG/DL (ref 70–99)
HCT VFR BLD AUTO: 46.4 % (ref 39–53)
HGB BLD-MCNC: 15.2 G/DL (ref 13–17.5)
IMM GRANULOCYTES # BLD AUTO: 0.01 X10(3) UL (ref 0–1)
IMM GRANULOCYTES NFR BLD: 0.2 %
IRON SERPL-MCNC: 103 UG/DL (ref 65–175)
LYMPHOCYTES # BLD AUTO: 1.08 X10(3) UL (ref 1–4)
LYMPHOCYTES NFR BLD AUTO: 23.2 %
M PROTEIN MFR SERPL ELPH: 6.9 G/DL (ref 6.4–8.2)
MCH RBC QN AUTO: 30.2 PG (ref 26–34)
MCHC RBC AUTO-ENTMCNC: 32.8 G/DL (ref 31–37)
MCV RBC AUTO: 92.2 FL (ref 80–100)
MONOCYTES # BLD AUTO: 0.58 X10(3) UL (ref 0.1–1)
MONOCYTES NFR BLD AUTO: 12.5 %
NEUTROPHILS # BLD AUTO: 2.86 X10 (3) UL (ref 1.5–7.7)
NEUTROPHILS # BLD AUTO: 2.86 X10(3) UL (ref 1.5–7.7)
NEUTROPHILS NFR BLD AUTO: 61.5 %
OSMOLALITY SERPL CALC.SUM OF ELEC: 297 MOSM/KG (ref 275–295)
PATIENT FASTING: YES
PLATELET # BLD AUTO: 115 10(3)UL (ref 150–450)
POTASSIUM SERPL-SCNC: 4.1 MMOL/L (ref 3.5–5.1)
RBC # BLD AUTO: 5.03 X10(6)UL (ref 4.3–5.7)
SODIUM SERPL-SCNC: 143 MMOL/L (ref 136–145)
VIT B12 SERPL-MCNC: 1602 PG/ML (ref 193–986)
WBC # BLD AUTO: 4.7 X10(3) UL (ref 4–11)

## 2019-07-10 PROCEDURE — 80053 COMPREHEN METABOLIC PANEL: CPT

## 2019-07-10 PROCEDURE — 36415 COLL VENOUS BLD VENIPUNCTURE: CPT

## 2019-07-10 PROCEDURE — 85025 COMPLETE CBC W/AUTO DIFF WBC: CPT

## 2019-07-10 PROCEDURE — 82746 ASSAY OF FOLIC ACID SERUM: CPT

## 2019-07-10 PROCEDURE — 82607 VITAMIN B-12: CPT

## 2019-07-10 PROCEDURE — 82306 VITAMIN D 25 HYDROXY: CPT

## 2019-07-10 PROCEDURE — 84425 ASSAY OF VITAMIN B-1: CPT

## 2019-07-10 PROCEDURE — 83540 ASSAY OF IRON: CPT

## 2019-07-13 LAB — VITAMIN B1 (THIAMINE), WHOLE B: 155 NMOL/L

## 2019-07-18 ENCOUNTER — OFFICE VISIT (OUTPATIENT)
Dept: HEMATOLOGY/ONCOLOGY | Facility: HOSPITAL | Age: 64
End: 2019-07-18
Attending: INTERNAL MEDICINE
Payer: COMMERCIAL

## 2019-07-18 VITALS
SYSTOLIC BLOOD PRESSURE: 136 MMHG | DIASTOLIC BLOOD PRESSURE: 78 MMHG | OXYGEN SATURATION: 99 % | BODY MASS INDEX: 28.97 KG/M2 | HEIGHT: 68 IN | TEMPERATURE: 98 F | HEART RATE: 51 BPM | WEIGHT: 191.13 LBS | RESPIRATION RATE: 18 BRPM

## 2019-07-18 DIAGNOSIS — E66.9 OBESITY, UNSPECIFIED CLASSIFICATION, UNSPECIFIED OBESITY TYPE, UNSPECIFIED WHETHER SERIOUS COMORBIDITY PRESENT: Primary | ICD-10-CM

## 2019-07-18 DIAGNOSIS — I82.401 DEEP VEIN THROMBOSIS (DVT) OF RIGHT LOWER EXTREMITY, UNSPECIFIED CHRONICITY, UNSPECIFIED VEIN (HCC): ICD-10-CM

## 2019-07-18 DIAGNOSIS — I26.99 OTHER PULMONARY EMBOLISM WITHOUT ACUTE COR PULMONALE, UNSPECIFIED CHRONICITY (HCC): ICD-10-CM

## 2019-07-18 PROCEDURE — 99214 OFFICE O/P EST MOD 30 MIN: CPT | Performed by: INTERNAL MEDICINE

## 2019-07-18 NOTE — PROGRESS NOTES
Cancer Center Progress Note    Patient Name: Lora Tijerina   YOB: 1955   Medical Record Number: [id number]   Attending Physician: Shereen Clinton M.D. Chief Complaint:  Right LE DVT.   PE    History of Present Illness:  77-year-old male w loss   • H/O tracheostomy     Age 2. 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    • High blood pressure    • History of blood transfusion     45 yrs ago   • Hypertension    • Lipid screening 6/21/13   • COLBY on CPAP     Dr. Javier Glez   • Pulmonary embolis use: No      Sexual activity: Not on file    Lifestyle      Physical activity:        Days per week: Not on file        Minutes per session: Not on file      Stress: Not on file    Relationships      Social connections:        Talks on phone: Not on file in acute distress. Psych:  Mood and affect appropriate  HEENT: EOMs intact. PERRL. Oropharynx is clear. Neck: No JVD. No palpable lymphadenopathy. Neck is supple. Lymphatics:  There is no palpable peripheral lymphadenopathy   Chest: Symmetric expansion, course of anticoagulation for provoked DVT. His d-dimer is negative and therefore monitor him off of anticoagulation.   He he does have increased risk going forward of future venous thromboembolic disease and gave him recommendations to avoid immobility  –

## 2019-07-25 ENCOUNTER — TELEPHONE (OUTPATIENT)
Dept: SURGERY | Facility: CLINIC | Age: 64
End: 2019-07-25

## 2019-07-25 NOTE — TELEPHONE ENCOUNTER
Left msg for pt to please call [address] [place] [address] to schedule 6 months post-RYGB appt with surgeon if hasn't done so already.    LOV with surgeon 4/25/19

## 2019-08-05 RX ORDER — BUPROPION HYDROCHLORIDE 100 MG/1
TABLET ORAL
Qty: 90 TABLET | Refills: 0 | Status: SHIPPED | OUTPATIENT
Start: 2019-08-05 | End: 2019-09-03

## 2019-08-13 ENCOUNTER — TELEPHONE (OUTPATIENT)
Dept: SURGERY | Facility: CLINIC | Age: 64
End: 2019-08-13

## 2019-08-13 NOTE — TELEPHONE ENCOUNTER
Left reminder msg for pt re: Bariatric Support Group meeting 8/14/19 @5:30p   Topic: Vitamins & Supplements for Life. Encouraged patient to attend.

## 2019-08-20 ENCOUNTER — TELEPHONE (OUTPATIENT)
Dept: FAMILY MEDICINE CLINIC | Facility: CLINIC | Age: 64
End: 2019-08-20

## 2019-08-20 NOTE — TELEPHONE ENCOUNTER
[facility] called in requesting LOV notes, lab results, and any imaging that Pt may have had. Pt is scheduled for tomorrow, 8/20. Please advise.      Fax# [phone]

## 2019-09-04 RX ORDER — BUPROPION HYDROCHLORIDE 100 MG/1
TABLET ORAL
Qty: 90 TABLET | Refills: 0 | Status: SHIPPED | OUTPATIENT
Start: 2019-09-04 | End: 2019-10-04

## 2019-09-19 RX ORDER — CYANOCOBALAMIN 500 UG/1
1 SPRAY NASAL
Qty: 1 BOTTLE | Refills: 11 | Status: SHIPPED | OUTPATIENT
Start: 2019-09-19 | End: 2019-10-03

## 2019-10-07 RX ORDER — BUPROPION HYDROCHLORIDE 100 MG/1
TABLET ORAL
Qty: 90 TABLET | Refills: 0 | Status: SHIPPED | OUTPATIENT
Start: 2019-10-07 | End: 2019-11-01

## 2019-10-07 RX ORDER — CYANOCOBALAMIN 500 UG/1
1 SPRAY NASAL
Qty: 1 BOTTLE | Refills: 11 | Status: SHIPPED | OUTPATIENT
Start: 2019-10-07 | End: 2020-10-12

## 2019-10-13 NOTE — TELEPHONE ENCOUNTER
Please review; protocol failed. LOV-04/03/19      Call center, pls assist with scheduling f/u appt. Thank you.     Requested Prescriptions   Pending Prescriptions Disp Refills   • ROPINIROLE HCL 0.25 MG Oral Tab [Pharmacy Med Name: ROPINIROLE 0.25MG TA

## 2019-10-14 RX ORDER — ROPINIROLE 0.25 MG/1
TABLET, FILM COATED ORAL
Qty: 180 TABLET | Refills: 1 | Status: SHIPPED | OUTPATIENT
Start: 2019-10-14 | End: 2019-12-23 | Stop reason: ALTCHOICE

## 2019-10-25 ENCOUNTER — HOSPITAL ENCOUNTER (OUTPATIENT)
Age: 64
Discharge: HOME | End: 2019-10-25
Attending: EMERGENCY MEDICINE
Payer: COMMERCIAL

## 2019-10-25 ENCOUNTER — TELEPHONE (OUTPATIENT)
Dept: SURGERY | Facility: CLINIC | Age: 64
End: 2019-10-25

## 2019-10-25 VITALS
DIASTOLIC BLOOD PRESSURE: 82 MMHG | SYSTOLIC BLOOD PRESSURE: 141 MMHG | OXYGEN SATURATION: 100 % | BODY MASS INDEX: 26.68 KG/M2 | RESPIRATION RATE: 17 BRPM | HEART RATE: 67 BPM | HEIGHT: 67 IN | WEIGHT: 170 LBS | TEMPERATURE: 98 F

## 2019-10-25 DIAGNOSIS — R42 DIZZINESS: Primary | ICD-10-CM

## 2019-10-25 PROCEDURE — 81002 URINALYSIS NONAUTO W/O SCOPE: CPT

## 2019-10-25 PROCEDURE — 36415 COLL VENOUS BLD VENIPUNCTURE: CPT

## 2019-10-25 PROCEDURE — 99203 OFFICE O/P NEW LOW 30 MIN: CPT

## 2019-10-25 PROCEDURE — 99213 OFFICE O/P EST LOW 20 MIN: CPT

## 2019-10-25 PROCEDURE — 85025 COMPLETE CBC W/AUTO DIFF WBC: CPT | Performed by: EMERGENCY MEDICINE

## 2019-10-25 PROCEDURE — 80047 BASIC METABLC PNL IONIZED CA: CPT

## 2019-10-25 NOTE — ED INITIAL ASSESSMENT (HPI)
Pt to [address] [place] [address] stating he \"feels dehydrated\". Reports \"muscle twitching\" and nausea x 5 days. Denies vomiting and diarrhea.  States he is tolerating po fluids (pedialyte/sports drinks) drinking 64 ounces a day without relief of symptoms

## 2019-10-25 NOTE — TELEPHONE ENCOUNTER
States he feels dehydrated    Weak, confused, dizzy    Drinking fluids, pedialyte, sports drinks    Advised to come to ER/urgent care if symptoms continue    He states he does have a sinus infection.     Also advised to check blood pressure

## 2019-10-25 NOTE — ED NOTES
Pt discharged home, all questions answered. Advised patient to go to ER if symptoms worsen and to follow up with PCP.

## 2019-10-25 NOTE — ED PROVIDER NOTES
Patient Seen in: [address]    History   Patient presents with:  Dizziness    Stated Complaint: dehydration    HPI    Patient presents with multiple complaints.   He did have gastric bypass surgery in January of this year 09/2018    repeat CLN in 3 years    • Visual impairment     eyeglasses       Past Surgical History:   Procedure Laterality Date   • APPENDECTOMY     • BARIATRIC GASTRIC BYPASS LAPAROSCOPIC DIANN  N-Y N/A 1/28/2019    Performed by Adriana Vanegas MD at [facility] Exam     ED Triage Vitals [10/25/19 1340]   /82   Pulse 67   Resp 17   Temp 98.3 °F (36.8 °C)   Temp src Oral   SpO2 100 %   O2 Device None (Room air)       Current:/82   Pulse 67   Temp 98.3 °F (36.8 °C) (Oral)   Resp 17   Ht 170.2 cm (5' 7\") dehydration and that was reassuring to him.       ED Course     Labs Reviewed   POCT CBC - Abnormal; Notable for the following components:       Result Value    PLT .0 (*)     All other components within normal limits   POCT ISTAT CHEM8 CARTRIDGE - Ab

## 2019-10-26 NOTE — TELEPHONE ENCOUNTER
[employer] message read by patient as follows: Last read by Joellen Irene at 6:10 PM on 10/25/2019.

## 2019-11-04 RX ORDER — BUPROPION HYDROCHLORIDE 100 MG/1
TABLET ORAL
Qty: 90 TABLET | Refills: 0 | Status: SHIPPED | OUTPATIENT
Start: 2019-11-04 | End: 2019-12-02

## 2019-12-02 RX ORDER — BUPROPION HYDROCHLORIDE 100 MG/1
TABLET ORAL
Qty: 90 TABLET | Refills: 0 | Status: SHIPPED | OUTPATIENT
Start: 2019-12-02 | End: 2019-12-30

## 2019-12-23 ENCOUNTER — OFFICE VISIT (OUTPATIENT)
Dept: FAMILY MEDICINE CLINIC | Facility: CLINIC | Age: 64
End: 2019-12-23
Payer: COMMERCIAL

## 2019-12-23 VITALS
DIASTOLIC BLOOD PRESSURE: 71 MMHG | WEIGHT: 170 LBS | BODY MASS INDEX: 27 KG/M2 | TEMPERATURE: 99 F | SYSTOLIC BLOOD PRESSURE: 124 MMHG | RESPIRATION RATE: 16 BRPM | HEART RATE: 72 BPM

## 2019-12-23 DIAGNOSIS — G25.81 RLS (RESTLESS LEGS SYNDROME): ICD-10-CM

## 2019-12-23 DIAGNOSIS — R42 DIZZINESS: Primary | ICD-10-CM

## 2019-12-23 DIAGNOSIS — I10 ESSENTIAL HYPERTENSION: ICD-10-CM

## 2019-12-23 PROCEDURE — 90472 IMMUNIZATION ADMIN EACH ADD: CPT | Performed by: FAMILY MEDICINE

## 2019-12-23 PROCEDURE — 99214 OFFICE O/P EST MOD 30 MIN: CPT | Performed by: FAMILY MEDICINE

## 2019-12-23 PROCEDURE — 90750 HZV VACC RECOMBINANT IM: CPT | Performed by: FAMILY MEDICINE

## 2019-12-23 PROCEDURE — 90471 IMMUNIZATION ADMIN: CPT | Performed by: FAMILY MEDICINE

## 2019-12-23 PROCEDURE — 90686 IIV4 VACC NO PRSV 0.5 ML IM: CPT | Performed by: FAMILY MEDICINE

## 2019-12-23 PROCEDURE — 90707 MMR VACCINE SC: CPT | Performed by: FAMILY MEDICINE

## 2019-12-23 RX ORDER — TADALAFIL 5 MG/1
5 TABLET ORAL
Qty: 30 TABLET | Refills: 1 | Status: SHIPPED | OUTPATIENT
Start: 2019-12-23 | End: 2021-01-18 | Stop reason: ALTCHOICE

## 2019-12-23 NOTE — PROGRESS NOTES
HPI: Birttany Lincoln is a 59year old male who presents for dizziness. Pt reports he gets lightheaded when he changes position. Takes Ropinirole for restless leg syndrome. Still gets cramping when he is falling asleep. On Tamsuosin and Viagra.   Psychiatrist recomm HCl 0.4 MG Oral Cap, Take by mouth daily. , Disp: , Rfl:   PSEUDOEPHEDRINE HCL OR, Take  by mouth. take 2 tablet (60MG)  by oral route  every 6 hours as needed, Disp: , Rfl:     No current facility-administered medications on file prior to visit.       Tobac

## 2019-12-30 RX ORDER — BUPROPION HYDROCHLORIDE 100 MG/1
TABLET ORAL
Qty: 90 TABLET | Refills: 0 | Status: SHIPPED | OUTPATIENT
Start: 2019-12-30 | End: 2020-01-27

## 2020-01-20 ENCOUNTER — OFFICE VISIT (OUTPATIENT)
Dept: SURGERY | Facility: CLINIC | Age: 65
End: 2020-01-20
Payer: COMMERCIAL

## 2020-01-20 VITALS
WEIGHT: 172 LBS | HEIGHT: 67 IN | BODY MASS INDEX: 27 KG/M2 | HEART RATE: 71 BPM | OXYGEN SATURATION: 99 % | SYSTOLIC BLOOD PRESSURE: 119 MMHG | DIASTOLIC BLOOD PRESSURE: 72 MMHG

## 2020-01-20 DIAGNOSIS — Z99.89 OSA ON CPAP: Primary | ICD-10-CM

## 2020-01-20 DIAGNOSIS — E66.3 OVERWEIGHT (BMI 25.0-29.9): ICD-10-CM

## 2020-01-20 DIAGNOSIS — Z98.84 H/O GASTRIC BYPASS: ICD-10-CM

## 2020-01-20 DIAGNOSIS — G47.33 OSA ON CPAP: Primary | ICD-10-CM

## 2020-01-20 DIAGNOSIS — E11.9 DIABETES MELLITUS TYPE 2, DIET-CONTROLLED (HCC): ICD-10-CM

## 2020-01-20 PROCEDURE — 99214 OFFICE O/P EST MOD 30 MIN: CPT | Performed by: INTERNAL MEDICINE

## 2020-01-20 RX ORDER — SILDENAFIL 50 MG/1
TABLET, FILM COATED ORAL
COMMUNITY
Start: 2020-01-17 | End: 2021-07-20

## 2020-01-20 RX ORDER — ROPINIROLE 0.25 MG/1
TABLET, FILM COATED ORAL
COMMUNITY
Start: 2020-01-11 | End: 2020-04-20

## 2020-01-20 NOTE — PROGRESS NOTES
[address]  Dept: [phone]      Patient:  Shan Winters  :      1955  MRN:      [id number]    Referring Provider: Valery Tran, needed, Disp: , Rfl:     Allergies:  Patient has no known allergies.      Social History:  Social History    Tobacco Use      Smoking status: Never Smoker      Smokeless tobacco: Never Used    Alcohol use: Yes      Comment: Beer & Wine Occasionally    Drug normal. No rashes or lesions       ROS:    Constitutional: positive for fatigue  Respiratory: negative  Cardiovascular: negative  Gastrointestinal: negative  Musculoskeletal:positive for arthralgias and back pain  Neurological: negative  Behavioral/Psych:

## 2020-01-27 RX ORDER — BUPROPION HYDROCHLORIDE 100 MG/1
TABLET ORAL
Qty: 90 TABLET | Refills: 0 | Status: SHIPPED | OUTPATIENT
Start: 2020-01-27 | End: 2020-02-24

## 2020-01-28 NOTE — TELEPHONE ENCOUNTER
LOV 10/11/18, No f/u scheduled.    LMTCB to ask pt if he needs albuterol refill still, not on med list.

## 2020-01-29 RX ORDER — ALBUTEROL SULFATE 90 UG/1
AEROSOL, METERED RESPIRATORY (INHALATION)
Qty: 6.7 G | Refills: 0 | Status: SHIPPED | OUTPATIENT
Start: 2020-01-29 | End: 2020-02-26

## 2020-02-12 ENCOUNTER — DOCUMENTATION ONLY (OUTPATIENT)
Dept: SURGERY | Facility: CLINIC | Age: 65
End: 2020-02-12

## 2020-02-12 NOTE — PROGRESS NOTES
Left reminder message for patient for support group \"How To Train The Quit Out of You\" 2/12/20 at 5:30pm

## 2020-02-24 RX ORDER — BUPROPION HYDROCHLORIDE 100 MG/1
TABLET ORAL
Qty: 90 TABLET | Refills: 0 | Status: SHIPPED | OUTPATIENT
Start: 2020-02-24 | End: 2020-03-30

## 2020-02-26 RX ORDER — ALBUTEROL SULFATE 90 UG/1
AEROSOL, METERED RESPIRATORY (INHALATION)
Qty: 6.7 G | Refills: 0 | Status: SHIPPED | OUTPATIENT
Start: 2020-02-26 | End: 2020-03-26

## 2020-03-26 NOTE — TELEPHONE ENCOUNTER
Current Outpatient Medications   Medication Sig Dispense Refill   • ALBUTEROL SULFATE  (90 Base) MCG/ACT Inhalation Aero Soln INHALE 2 PUFFS INTO THE LUNGS FOUR TIMES DAILY AS NEEDED FOR WHEEZING 6.7 g 0

## 2020-03-27 RX ORDER — ALBUTEROL SULFATE 90 UG/1
AEROSOL, METERED RESPIRATORY (INHALATION)
Qty: 6.7 G | Refills: 0 | Status: SHIPPED | OUTPATIENT
Start: 2020-03-27 | End: 2020-04-24

## 2020-03-30 RX ORDER — BUPROPION HYDROCHLORIDE 100 MG/1
TABLET ORAL
Qty: 90 TABLET | Refills: 0 | Status: SHIPPED | OUTPATIENT
Start: 2020-03-30 | End: 2020-04-29

## 2020-04-06 ENCOUNTER — NURSE ONLY (OUTPATIENT)
Dept: FAMILY MEDICINE CLINIC | Facility: CLINIC | Age: 65
End: 2020-04-06
Payer: COMMERCIAL

## 2020-04-06 DIAGNOSIS — Z23 NEED FOR SHINGLES VACCINE: Primary | ICD-10-CM

## 2020-04-06 PROCEDURE — 90471 IMMUNIZATION ADMIN: CPT | Performed by: FAMILY MEDICINE

## 2020-04-06 PROCEDURE — 90750 HZV VACC RECOMBINANT IM: CPT | Performed by: FAMILY MEDICINE

## 2020-04-20 RX ORDER — ROPINIROLE 0.25 MG/1
TABLET, FILM COATED ORAL
Qty: 180 TABLET | Refills: 0 | Status: SHIPPED | OUTPATIENT
Start: 2020-04-20 | End: 2020-07-19

## 2020-04-24 RX ORDER — ALBUTEROL SULFATE 90 UG/1
AEROSOL, METERED RESPIRATORY (INHALATION)
Qty: 6.7 G | Refills: 0 | Status: SHIPPED | OUTPATIENT
Start: 2020-04-24 | End: 2020-05-18

## 2020-04-29 RX ORDER — BUPROPION HYDROCHLORIDE 100 MG/1
TABLET ORAL
Qty: 90 TABLET | Refills: 0 | Status: SHIPPED | OUTPATIENT
Start: 2020-04-29

## 2020-05-18 RX ORDER — ALBUTEROL SULFATE 90 UG/1
AEROSOL, METERED RESPIRATORY (INHALATION)
Qty: 3 INHALER | Refills: 4 | Status: SHIPPED | OUTPATIENT
Start: 2020-05-18

## 2020-05-18 NOTE — TELEPHONE ENCOUNTER
Last office visit 10/11/2018  Last refill 4/24/2020    Dr. Moise Perez- Please review/sign pended order, if agreeable.

## 2020-06-02 RX ORDER — BUPROPION HYDROCHLORIDE 100 MG/1
TABLET ORAL
Qty: 90 TABLET | Refills: 0 | OUTPATIENT
Start: 2020-06-02

## 2020-07-08 NOTE — PROGRESS NOTES
[address]  Dept: [phone]      Patient:  Joce Ibanez  :      1955  MRN:      [id number]    Referring Provider: Kobi Overton, MG Oral Tab, Take 1 mg by mouth 4 (four) times daily. , Disp: , Rfl:   •  PSEUDOEPHEDRINE HCL OR, Take  by mouth. take 2 tablet (60MG)  by oral route  every 6 hours as needed, Disp: , Rfl:     Allergies:  Patient has no known allergies.      Social History sounds normal; no masses,  no organomegaly  Extremities: extremities normal, atraumatic, no cyanosis or edema  Pulses: 2+ and symmetric  Skin: Skin color, texture, turgor normal. No rashes or lesions       ROS:    Constitutional: positive for fatigue  Resp

## 2020-07-19 RX ORDER — ROPINIROLE 0.25 MG/1
TABLET, FILM COATED ORAL
Qty: 180 TABLET | Refills: 0 | Status: SHIPPED | OUTPATIENT
Start: 2020-07-19 | End: 2020-10-27

## 2020-10-12 RX ORDER — CYANOCOBALAMIN 500 UG/1
1 SPRAY NASAL WEEKLY
Qty: 4 BOTTLE | Refills: 11 | Status: SHIPPED | OUTPATIENT
Start: 2020-10-12 | End: 2021-06-14

## 2020-10-27 RX ORDER — ROPINIROLE 0.25 MG/1
TABLET, FILM COATED ORAL
Qty: 180 TABLET | Refills: 0 | Status: SHIPPED | OUTPATIENT
Start: 2020-10-27 | End: 2021-01-19

## 2021-01-18 ENCOUNTER — OFFICE VISIT (OUTPATIENT)
Dept: SURGERY | Facility: CLINIC | Age: 66
End: 2021-01-18
Payer: COMMERCIAL

## 2021-01-18 VITALS
OXYGEN SATURATION: 98 % | HEART RATE: 74 BPM | SYSTOLIC BLOOD PRESSURE: 136 MMHG | BODY MASS INDEX: 28.56 KG/M2 | WEIGHT: 182 LBS | HEIGHT: 67 IN | DIASTOLIC BLOOD PRESSURE: 75 MMHG

## 2021-01-18 DIAGNOSIS — E66.3 OVERWEIGHT (BMI 25.0-29.9): ICD-10-CM

## 2021-01-18 DIAGNOSIS — Z98.84 H/O GASTRIC BYPASS: ICD-10-CM

## 2021-01-18 DIAGNOSIS — E11.9 DIABETES MELLITUS TYPE 2, DIET-CONTROLLED (HCC): Primary | ICD-10-CM

## 2021-01-18 DIAGNOSIS — E44.1 MILD PROTEIN-CALORIE MALNUTRITION (HCC): ICD-10-CM

## 2021-01-18 DIAGNOSIS — E55.9 VITAMIN D DEFICIENCY: ICD-10-CM

## 2021-01-18 PROCEDURE — 3078F DIAST BP <80 MM HG: CPT | Performed by: INTERNAL MEDICINE

## 2021-01-18 PROCEDURE — 99214 OFFICE O/P EST MOD 30 MIN: CPT | Performed by: INTERNAL MEDICINE

## 2021-01-18 PROCEDURE — 3008F BODY MASS INDEX DOCD: CPT | Performed by: INTERNAL MEDICINE

## 2021-01-18 PROCEDURE — 3075F SYST BP GE 130 - 139MM HG: CPT | Performed by: INTERNAL MEDICINE

## 2021-01-18 NOTE — PROGRESS NOTES
[address], [address]  Dept: [phone]      Patient:  Jade Bass  :      1955  MRN:      [id number]    Referring Provider: Christin Rendon, mouth. take 2 tablet (60MG)  by oral route  every 6 hours as needed, Disp: , Rfl:     Allergies:  Patient has no known allergies.      Social History:  Social History    Tobacco Use      Smoking status: Never Smoker      Smokeless tobacco: Never Used    Alc edema  Pulses: 2+ and symmetric  Skin: Skin color, texture, turgor normal. No rashes or lesions       ROS:    Constitutional: positive for fatigue  Respiratory: negative  Cardiovascular: negative  Gastrointestinal: negative  Musculoskeletal:positive for ar patient          Answer: Immediate      Vitamin D, 25-Hydroxy          Standing Status: Future          Standing Expiration Date: 1/18/2022          Order Specific Question: Release to patient          Answer: Immediate      Vitamin B12          Standing S

## 2021-01-19 RX ORDER — ROPINIROLE 0.25 MG/1
TABLET, FILM COATED ORAL
Qty: 180 TABLET | Refills: 0 | Status: SHIPPED | OUTPATIENT
Start: 2021-01-19 | End: 2021-04-12

## 2021-01-20 ENCOUNTER — LAB ENCOUNTER (OUTPATIENT)
Dept: LAB | Facility: HOSPITAL | Age: 66
End: 2021-01-20
Attending: INTERNAL MEDICINE
Payer: COMMERCIAL

## 2021-01-20 DIAGNOSIS — E55.9 VITAMIN D DEFICIENCY: ICD-10-CM

## 2021-01-20 DIAGNOSIS — E66.3 OVERWEIGHT (BMI 25.0-29.9): ICD-10-CM

## 2021-01-20 DIAGNOSIS — Z98.84 H/O GASTRIC BYPASS: ICD-10-CM

## 2021-01-20 DIAGNOSIS — E44.1 MILD PROTEIN-CALORIE MALNUTRITION (HCC): ICD-10-CM

## 2021-01-20 DIAGNOSIS — E11.9 DIABETES MELLITUS TYPE 2, DIET-CONTROLLED (HCC): ICD-10-CM

## 2021-01-20 LAB
25(OH)D3 SERPL-MCNC: 31 NG/ML (ref 30–100)
ALBUMIN SERPL-MCNC: 3.8 G/DL (ref 3.4–5)
ALBUMIN/GLOB SERPL: 1.2 {RATIO} (ref 1–2)
ALP LIVER SERPL-CCNC: 69 U/L
ALT SERPL-CCNC: 34 U/L
ANION GAP SERPL CALC-SCNC: 6 MMOL/L (ref 0–18)
AST SERPL-CCNC: 21 U/L (ref 15–37)
BILIRUB SERPL-MCNC: 1 MG/DL (ref 0.1–2)
BUN BLD-MCNC: 16 MG/DL (ref 7–18)
BUN/CREAT SERPL: 14.5 (ref 10–20)
CALCIUM BLD-MCNC: 8.9 MG/DL (ref 8.5–10.1)
CHLORIDE SERPL-SCNC: 105 MMOL/L (ref 98–112)
CHOLEST SMN-MCNC: 109 MG/DL (ref ?–200)
CO2 SERPL-SCNC: 31 MMOL/L (ref 21–32)
CREAT BLD-MCNC: 1.1 MG/DL
DEPRECATED RDW RBC AUTO: 44.9 FL (ref 35.1–46.3)
ERYTHROCYTE [DISTWIDTH] IN BLOOD BY AUTOMATED COUNT: 13.4 % (ref 11–15)
GLOBULIN PLAS-MCNC: 3.3 G/DL (ref 2.8–4.4)
GLUCOSE BLD-MCNC: 82 MG/DL (ref 70–99)
HCT VFR BLD AUTO: 45.8 %
HDLC SERPL-MCNC: 55 MG/DL (ref 40–59)
HGB BLD-MCNC: 15.2 G/DL
LDLC SERPL CALC-MCNC: 42 MG/DL (ref ?–100)
M PROTEIN MFR SERPL ELPH: 7.1 G/DL (ref 6.4–8.2)
MCH RBC QN AUTO: 30.1 PG (ref 26–34)
MCHC RBC AUTO-ENTMCNC: 33.2 G/DL (ref 31–37)
MCV RBC AUTO: 90.7 FL
NONHDLC SERPL-MCNC: 54 MG/DL (ref ?–130)
OSMOLALITY SERPL CALC.SUM OF ELEC: 294 MOSM/KG (ref 275–295)
PATIENT FASTING Y/N/NP: YES
PATIENT FASTING Y/N/NP: YES
PLATELET # BLD AUTO: 111 10(3)UL (ref 150–450)
POTASSIUM SERPL-SCNC: 4.2 MMOL/L (ref 3.5–5.1)
RBC # BLD AUTO: 5.05 X10(6)UL
SODIUM SERPL-SCNC: 142 MMOL/L (ref 136–145)
TRIGL SERPL-MCNC: 58 MG/DL (ref 30–149)
TSI SER-ACNC: 4.43 MIU/ML (ref 0.36–3.74)
VIT B12 SERPL-MCNC: 1515 PG/ML (ref 193–986)
VLDLC SERPL CALC-MCNC: 12 MG/DL (ref 0–30)
WBC # BLD AUTO: 3.7 X10(3) UL (ref 4–11)

## 2021-01-20 PROCEDURE — 80061 LIPID PANEL: CPT

## 2021-01-20 PROCEDURE — 82607 VITAMIN B-12: CPT

## 2021-01-20 PROCEDURE — 82397 CHEMILUMINESCENT ASSAY: CPT

## 2021-01-20 PROCEDURE — 85027 COMPLETE CBC AUTOMATED: CPT

## 2021-01-20 PROCEDURE — 82306 VITAMIN D 25 HYDROXY: CPT

## 2021-01-20 PROCEDURE — 36415 COLL VENOUS BLD VENIPUNCTURE: CPT

## 2021-01-20 PROCEDURE — 84443 ASSAY THYROID STIM HORMONE: CPT

## 2021-01-20 PROCEDURE — 80053 COMPREHEN METABOLIC PANEL: CPT

## 2021-01-20 PROCEDURE — 84425 ASSAY OF VITAMIN B-1: CPT

## 2021-01-23 LAB — VITAMIN B1 (THIAMINE), WHOLE B: 179 NMOL/L

## 2021-01-25 LAB — LEPTIN: 1.1 NG/ML

## 2021-02-09 ENCOUNTER — TELEPHONE (OUTPATIENT)
Dept: FAMILY MEDICINE CLINIC | Facility: CLINIC | Age: 66
End: 2021-02-09

## 2021-02-10 NOTE — TELEPHONE ENCOUNTER
Please ask patient to schedule follow-up appt with me as we have not seen him in a year. Will need hemoglobin A1C at that time.

## 2021-02-10 NOTE — TELEPHONE ENCOUNTER
Per pt, he's no longer diabetic since bariatric surgery 2 years ago. Will send request to remove from the registry. Pt will call to schedule a px.

## 2021-03-04 DIAGNOSIS — Z23 NEED FOR VACCINATION: ICD-10-CM

## 2021-03-08 ENCOUNTER — IMMUNIZATION (OUTPATIENT)
Dept: LAB | Age: 66
End: 2021-03-08
Attending: HOSPITALIST
Payer: COMMERCIAL

## 2021-03-08 DIAGNOSIS — Z23 NEED FOR VACCINATION: Primary | ICD-10-CM

## 2021-03-08 PROCEDURE — 0001A SARSCOV2 VAC 30MCG/0.3ML IM: CPT

## 2021-03-29 ENCOUNTER — IMMUNIZATION (OUTPATIENT)
Dept: LAB | Age: 66
End: 2021-03-29
Attending: HOSPITALIST
Payer: COMMERCIAL

## 2021-03-29 DIAGNOSIS — Z23 NEED FOR VACCINATION: Primary | ICD-10-CM

## 2021-03-29 PROCEDURE — 0002A SARSCOV2 VAC 30MCG/0.3ML IM: CPT

## 2021-04-12 RX ORDER — ROPINIROLE 0.25 MG/1
TABLET, FILM COATED ORAL
Qty: 180 TABLET | Refills: 0 | Status: SHIPPED | OUTPATIENT
Start: 2021-04-12 | End: 2021-07-07

## 2021-04-19 ENCOUNTER — OFFICE VISIT (OUTPATIENT)
Dept: SURGERY | Facility: CLINIC | Age: 66
End: 2021-04-19
Payer: COMMERCIAL

## 2021-04-19 VITALS
BODY MASS INDEX: 29.09 KG/M2 | DIASTOLIC BLOOD PRESSURE: 74 MMHG | HEIGHT: 67 IN | HEART RATE: 55 BPM | OXYGEN SATURATION: 98 % | SYSTOLIC BLOOD PRESSURE: 128 MMHG | WEIGHT: 185.31 LBS

## 2021-04-19 DIAGNOSIS — E66.3 OVERWEIGHT (BMI 25.0-29.9): ICD-10-CM

## 2021-04-19 DIAGNOSIS — Z98.84 H/O GASTRIC BYPASS: ICD-10-CM

## 2021-04-19 DIAGNOSIS — E11.9 DIABETES MELLITUS TYPE 2, DIET-CONTROLLED (HCC): Primary | ICD-10-CM

## 2021-04-19 PROBLEM — E66.9 OBESITY (BMI 30-39.9): Status: RESOLVED | Noted: 2018-12-26 | Resolved: 2021-04-19

## 2021-04-19 PROBLEM — E66.01 MORBID OBESITY WITH BMI OF 40.0-44.9, ADULT (HCC): Status: RESOLVED | Noted: 2018-07-16 | Resolved: 2021-04-19

## 2021-04-19 PROBLEM — Z99.89 OSA ON CPAP: Status: RESOLVED | Noted: 2018-07-16 | Resolved: 2021-04-19

## 2021-04-19 PROBLEM — G47.33 OSA ON CPAP: Status: RESOLVED | Noted: 2018-07-16 | Resolved: 2021-04-19

## 2021-04-19 PROCEDURE — 3078F DIAST BP <80 MM HG: CPT | Performed by: INTERNAL MEDICINE

## 2021-04-19 PROCEDURE — 3074F SYST BP LT 130 MM HG: CPT | Performed by: INTERNAL MEDICINE

## 2021-04-19 PROCEDURE — 99214 OFFICE O/P EST MOD 30 MIN: CPT | Performed by: INTERNAL MEDICINE

## 2021-04-19 PROCEDURE — 3008F BODY MASS INDEX DOCD: CPT | Performed by: INTERNAL MEDICINE

## 2021-04-19 NOTE — PROGRESS NOTES
[address] [place] [address],[address]  Dept: [phone]      Patient:  Shan Winters  :      1955  MRN:      [id number]    Referring Provider: Valery Tran, Disp: , Rfl:   •  PSEUDOEPHEDRINE HCL OR, Take  by mouth. take 2 tablet (60MG)  by oral route  every 6 hours as needed, Disp: , Rfl:     Allergies:  Patient has no known allergies.      Social History:  Social History    Tobacco Use      Smoking status: [employer] for fatigue  Respiratory: negative  Cardiovascular: negative  Gastrointestinal: negative  Musculoskeletal:positive for arthralgias and back pain  Neurological: negative  Behavioral/Psych: negative  Endocrine: negative  All other systems were reviewed and a

## 2021-05-20 ENCOUNTER — LAB ENCOUNTER (OUTPATIENT)
Dept: LAB | Facility: HOSPITAL | Age: 66
End: 2021-05-20
Payer: COMMERCIAL

## 2021-05-20 DIAGNOSIS — J30.9 ALLERGIC RHINITIS DUE TO ALLERGEN: Primary | ICD-10-CM

## 2021-05-20 PROCEDURE — 86003 ALLG SPEC IGE CRUDE XTRC EA: CPT

## 2021-05-20 PROCEDURE — 36415 COLL VENOUS BLD VENIPUNCTURE: CPT

## 2021-05-20 PROCEDURE — 86606 ASPERGILLUS ANTIBODY: CPT

## 2021-07-07 RX ORDER — ROPINIROLE 0.25 MG/1
TABLET, FILM COATED ORAL
Qty: 180 TABLET | Refills: 0 | Status: SHIPPED | OUTPATIENT
Start: 2021-07-07 | End: 2021-07-20 | Stop reason: DRUGHIGH

## 2021-07-20 ENCOUNTER — OFFICE VISIT (OUTPATIENT)
Dept: FAMILY MEDICINE CLINIC | Facility: CLINIC | Age: 66
End: 2021-07-20
Payer: COMMERCIAL

## 2021-07-20 ENCOUNTER — LAB ENCOUNTER (OUTPATIENT)
Dept: LAB | Age: 66
End: 2021-07-20
Attending: FAMILY MEDICINE
Payer: COMMERCIAL

## 2021-07-20 VITALS
WEIGHT: 176 LBS | TEMPERATURE: 97 F | RESPIRATION RATE: 16 BRPM | DIASTOLIC BLOOD PRESSURE: 80 MMHG | BODY MASS INDEX: 28 KG/M2 | SYSTOLIC BLOOD PRESSURE: 124 MMHG | HEART RATE: 61 BPM

## 2021-07-20 DIAGNOSIS — R05.9 COUGH: ICD-10-CM

## 2021-07-20 DIAGNOSIS — G25.81 RLS (RESTLESS LEGS SYNDROME): ICD-10-CM

## 2021-07-20 DIAGNOSIS — Z01.818 PRE-OP EVALUATION: Primary | ICD-10-CM

## 2021-07-20 DIAGNOSIS — Z01.818 PRE-OP EVALUATION: ICD-10-CM

## 2021-07-20 LAB
ALBUMIN SERPL-MCNC: 3.9 G/DL (ref 3.4–5)
ALBUMIN/GLOB SERPL: 1.1 {RATIO} (ref 1–2)
ALP LIVER SERPL-CCNC: 81 U/L
ALT SERPL-CCNC: 41 U/L
ANION GAP SERPL CALC-SCNC: 5 MMOL/L (ref 0–18)
AST SERPL-CCNC: 22 U/L (ref 15–37)
BILIRUB SERPL-MCNC: 0.7 MG/DL (ref 0.1–2)
BUN BLD-MCNC: 16 MG/DL (ref 7–18)
BUN/CREAT SERPL: 17.8 (ref 10–20)
CALCIUM BLD-MCNC: 9 MG/DL (ref 8.5–10.1)
CHLORIDE SERPL-SCNC: 105 MMOL/L (ref 98–112)
CO2 SERPL-SCNC: 30 MMOL/L (ref 21–32)
CREAT BLD-MCNC: 0.9 MG/DL
DEPRECATED RDW RBC AUTO: 48.2 FL (ref 35.1–46.3)
ERYTHROCYTE [DISTWIDTH] IN BLOOD BY AUTOMATED COUNT: 13.9 % (ref 11–15)
GLOBULIN PLAS-MCNC: 3.4 G/DL (ref 2.8–4.4)
GLUCOSE BLD-MCNC: 78 MG/DL (ref 70–99)
HCT VFR BLD AUTO: 47.8 %
HGB BLD-MCNC: 15 G/DL
M PROTEIN MFR SERPL ELPH: 7.3 G/DL (ref 6.4–8.2)
MCH RBC QN AUTO: 29.2 PG (ref 26–34)
MCHC RBC AUTO-ENTMCNC: 31.4 G/DL (ref 31–37)
MCV RBC AUTO: 93 FL
OSMOLALITY SERPL CALC.SUM OF ELEC: 290 MOSM/KG (ref 275–295)
PATIENT FASTING Y/N/NP: NO
PLATELET # BLD AUTO: 141 10(3)UL (ref 150–450)
POTASSIUM SERPL-SCNC: 3.7 MMOL/L (ref 3.5–5.1)
RBC # BLD AUTO: 5.14 X10(6)UL
SODIUM SERPL-SCNC: 140 MMOL/L (ref 136–145)
TSI SER-ACNC: 2.34 MIU/ML (ref 0.36–3.74)
WBC # BLD AUTO: 5.2 X10(3) UL (ref 4–11)

## 2021-07-20 PROCEDURE — 93000 ELECTROCARDIOGRAM COMPLETE: CPT | Performed by: FAMILY MEDICINE

## 2021-07-20 PROCEDURE — 3079F DIAST BP 80-89 MM HG: CPT | Performed by: FAMILY MEDICINE

## 2021-07-20 PROCEDURE — 84443 ASSAY THYROID STIM HORMONE: CPT

## 2021-07-20 PROCEDURE — 36415 COLL VENOUS BLD VENIPUNCTURE: CPT

## 2021-07-20 PROCEDURE — 3074F SYST BP LT 130 MM HG: CPT | Performed by: FAMILY MEDICINE

## 2021-07-20 PROCEDURE — 85027 COMPLETE CBC AUTOMATED: CPT

## 2021-07-20 PROCEDURE — 80053 COMPREHEN METABOLIC PANEL: CPT

## 2021-07-20 PROCEDURE — 99243 OFF/OP CNSLTJ NEW/EST LOW 30: CPT | Performed by: FAMILY MEDICINE

## 2021-07-20 RX ORDER — ROPINIROLE 0.5 MG/1
0.5 TABLET, FILM COATED ORAL NIGHTLY
Qty: 90 TABLET | Refills: 1 | Status: SHIPPED | OUTPATIENT
Start: 2021-07-20 | End: 2021-10-12 | Stop reason: DRUGHIGH

## 2021-07-20 RX ORDER — AMOXICILLIN AND CLAVULANATE POTASSIUM 875; 125 MG/1; MG/1
1 TABLET, FILM COATED ORAL 2 TIMES DAILY
Qty: 20 TABLET | Refills: 0 | Status: SHIPPED | OUTPATIENT
Start: 2021-07-20 | End: 2021-07-30

## 2021-07-20 NOTE — PROGRESS NOTES
HPI: Cynthia Rodriguez is a 72year old male who presents for pre-op physical.  Having endoscopic sinus, septoplasty, turbinate reduction by Dr. Jt Connell on 8/3/2021 at [address] [place] [address]. Will have general anesthesia. Has never had problems with anesthesia.       Used to be d Tab, TAKE 2 TABLETS BY MOUTH EVERY EVENING, Disp: 180 tablet, Rfl: 0  Cyanocobalamin (NASCOBAL) 500 MCG/0.1ML Nasal Solution, 1 spray by Nasal route once a week., Disp: 1 each, Rfl: 11  Albuterol Sulfate  (90 Base) MCG/ACT Inhalation Aero Soln, INHA good aeration               No wheezes, rales or rhonchi  Abd: soft, non-tender, non-distended          Normal bowel sounds; no masses          No hepatosplenomegaly  Extremities: No cyanosis, clubbing, edema. Pedal pulses 2+ viktoriya. MSK:  No abnormalities.

## 2021-07-28 ENCOUNTER — TELEPHONE (OUTPATIENT)
Dept: FAMILY MEDICINE CLINIC | Facility: CLINIC | Age: 66
End: 2021-07-28

## 2021-07-28 NOTE — TELEPHONE ENCOUNTER
Tammi Garduno from Dr. Humberto Gill office calling requesting fax of pts px notes.      Fax: [phone]

## 2021-07-28 NOTE — TELEPHONE ENCOUNTER
Called DR Nickerson's office, confirmed that pt needs a CXR as well. Pt contacted, advised to have it done ASAP. Pt stts he will go tomorrow morning. Will fax clearance as soon as results are in. All other testing is completed.

## 2021-07-30 ENCOUNTER — HOSPITAL ENCOUNTER (OUTPATIENT)
Dept: GENERAL RADIOLOGY | Facility: HOSPITAL | Age: 66
Discharge: HOME | End: 2021-07-30
Attending: FAMILY MEDICINE
Payer: COMMERCIAL

## 2021-07-30 DIAGNOSIS — Z01.818 PRE-OP EVALUATION: ICD-10-CM

## 2021-07-30 PROCEDURE — 71046 X-RAY EXAM CHEST 2 VIEWS: CPT | Performed by: FAMILY MEDICINE

## 2021-07-30 NOTE — TELEPHONE ENCOUNTER
Noah Blackman/ Surgery Scheduler of Dr. Yadira Simpson office is requesting history and physical, labs, chest XRAY, and EKG sent to their office at fax# [phone]. Noah Clark is requesting this by today 07/30.

## 2021-08-27 ENCOUNTER — TELEPHONE (OUTPATIENT)
Dept: GASTROENTEROLOGY | Facility: CLINIC | Age: 66
End: 2021-08-27

## 2021-08-27 NOTE — TELEPHONE ENCOUNTER
----- Message from Jeremy Truong CMA sent at 10/2/2018  4:18 PM CDT -----  Regarding: 3 yr CLN recall  Recall colon in 3 years per.  Colon done 9/26/18

## 2021-10-04 DIAGNOSIS — Z11.59 SCREENING EXAMINATION FOR OTHER ARTHROPOD-BORNE VIRAL DISEASES: Primary | ICD-10-CM

## 2021-10-12 ENCOUNTER — LAB ENCOUNTER (OUTPATIENT)
Dept: LAB | Age: 66
End: 2021-10-12
Attending: FAMILY MEDICINE
Payer: COMMERCIAL

## 2021-10-12 ENCOUNTER — OFFICE VISIT (OUTPATIENT)
Dept: FAMILY MEDICINE CLINIC | Facility: CLINIC | Age: 66
End: 2021-10-12
Payer: COMMERCIAL

## 2021-10-12 VITALS
BODY MASS INDEX: 30 KG/M2 | SYSTOLIC BLOOD PRESSURE: 148 MMHG | HEART RATE: 78 BPM | WEIGHT: 189 LBS | RESPIRATION RATE: 16 BRPM | DIASTOLIC BLOOD PRESSURE: 83 MMHG | TEMPERATURE: 98 F

## 2021-10-12 DIAGNOSIS — M79.661 RIGHT CALF PAIN: ICD-10-CM

## 2021-10-12 DIAGNOSIS — G25.81 RESTLESS LEG SYNDROME: ICD-10-CM

## 2021-10-12 DIAGNOSIS — R05.9 COUGH: Primary | ICD-10-CM

## 2021-10-12 DIAGNOSIS — R53.1 WEAKNESS: ICD-10-CM

## 2021-10-12 DIAGNOSIS — E53.8 VITAMIN B12 DEFICIENCY: ICD-10-CM

## 2021-10-12 DIAGNOSIS — J39.2 THROAT IRRITATION: ICD-10-CM

## 2021-10-12 DIAGNOSIS — R43.2 ABNORMAL SENSE OF TASTE: ICD-10-CM

## 2021-10-12 DIAGNOSIS — E53.8 FOLATE DEFICIENCY: ICD-10-CM

## 2021-10-12 PROCEDURE — 99214 OFFICE O/P EST MOD 30 MIN: CPT | Performed by: FAMILY MEDICINE

## 2021-10-12 PROCEDURE — 36415 COLL VENOUS BLD VENIPUNCTURE: CPT

## 2021-10-12 PROCEDURE — 86431 RHEUMATOID FACTOR QUANT: CPT

## 2021-10-12 PROCEDURE — 3079F DIAST BP 80-89 MM HG: CPT | Performed by: FAMILY MEDICINE

## 2021-10-12 PROCEDURE — 86039 ANTINUCLEAR ANTIBODIES (ANA): CPT

## 2021-10-12 PROCEDURE — 90662 IIV NO PRSV INCREASED AG IM: CPT | Performed by: FAMILY MEDICINE

## 2021-10-12 PROCEDURE — 90471 IMMUNIZATION ADMIN: CPT | Performed by: FAMILY MEDICINE

## 2021-10-12 PROCEDURE — 86038 ANTINUCLEAR ANTIBODIES: CPT

## 2021-10-12 PROCEDURE — 3077F SYST BP >= 140 MM HG: CPT | Performed by: FAMILY MEDICINE

## 2021-10-12 PROCEDURE — 85652 RBC SED RATE AUTOMATED: CPT

## 2021-10-12 PROCEDURE — 84443 ASSAY THYROID STIM HORMONE: CPT

## 2021-10-12 RX ORDER — ROPINIROLE 1 MG/1
1 TABLET, FILM COATED ORAL NIGHTLY
Qty: 90 TABLET | Refills: 0 | Status: SHIPPED | OUTPATIENT
Start: 2021-10-12 | End: 2021-12-16

## 2021-10-12 RX ORDER — AMOXICILLIN AND CLAVULANATE POTASSIUM 875; 125 MG/1; MG/1
1 TABLET, FILM COATED ORAL 2 TIMES DAILY
Qty: 20 TABLET | Refills: 0 | Status: SHIPPED | OUTPATIENT
Start: 2021-10-12 | End: 2021-10-22

## 2021-10-12 NOTE — PROGRESS NOTES
HPI: Luis Duggan is a 77year old male who presents for follow-up. Pt states that symptoms have not changed. He has chest congestion, difficulty swallowing, post nasal drip. Has not had sinus surgery yet due to respiratory symptoms.   Has been tested twice for allergies.    Meds: rOPINIRole HCl 0.5 MG Oral Tab, Take 1 tablet (0.5 mg total) by mouth nightly., Disp: 90 tablet, Rfl: 1  Cyanocobalamin (NASCOBAL) 500 MCG/0.1ML Nasal Solution, 1 spray by Nasal route once a week., Disp: 1 each, Rfl: 11  Albuterol Sulfat

## 2021-10-19 DIAGNOSIS — R76.8 POSITIVE ANA (ANTINUCLEAR ANTIBODY): Primary | ICD-10-CM

## 2021-10-22 ENCOUNTER — OFFICE VISIT (OUTPATIENT)
Dept: OTOLARYNGOLOGY | Facility: CLINIC | Age: 66
End: 2021-10-22
Payer: COMMERCIAL

## 2021-10-22 VITALS — WEIGHT: 185 LBS | BODY MASS INDEX: 28.04 KG/M2 | HEIGHT: 68 IN

## 2021-10-22 DIAGNOSIS — R43.0 ANOSMIA: Primary | ICD-10-CM

## 2021-10-22 DIAGNOSIS — J34.2 NASAL SEPTAL DEVIATION: ICD-10-CM

## 2021-10-22 DIAGNOSIS — R49.9 VOICE DISTURBANCE: ICD-10-CM

## 2021-10-22 DIAGNOSIS — R09.82 POSTNASAL DRIP: ICD-10-CM

## 2021-10-22 PROCEDURE — 31575 DIAGNOSTIC LARYNGOSCOPY: CPT | Performed by: SPECIALIST

## 2021-10-22 PROCEDURE — 99203 OFFICE O/P NEW LOW 30 MIN: CPT | Performed by: SPECIALIST

## 2021-10-22 PROCEDURE — 3008F BODY MASS INDEX DOCD: CPT | Performed by: SPECIALIST

## 2021-10-22 NOTE — PATIENT INSTRUCTIONS
You had several findings on your fiberoptic examination. The first is a right septal deviation. You also had very thick mucoid secretions. Your vocal cords were somewhat thickened and also bowing.   I have recommended that you use both Mucinex and a Nena Cottrell

## 2021-10-22 NOTE — PROGRESS NOTES
Ashley Husain is a 77year old male. Patient presents with:  Sinus Problem: pt presents today for frequent sinus infections, with congestions, and dryness on both nostrils. HPI:   With a loss of smell, this occurred after his vaccination was done. Visual impairment     eyeglasses      Social History:  Social History    Tobacco Use      Smoking status: Never Smoker      Smokeless tobacco: Never Used    Alcohol use: Not Currently      Comment: Beer & Wine Occasionally    Drug use: No       REVIEW OF S true and false vocal cords, aryepiglottic folds, piriform sinuses and post cricord area were examined for tumors and infectious processes as well as for evidence of reflux and retained secretions. Vocal cord mobility was also assessed.     Any abnormalitie

## 2021-11-04 ENCOUNTER — TELEPHONE (OUTPATIENT)
Dept: GASTROENTEROLOGY | Facility: CLINIC | Age: 66
End: 2021-11-04

## 2021-11-04 DIAGNOSIS — Z86.010 PERSONAL HISTORY OF COLONIC POLYPS: Primary | ICD-10-CM

## 2021-11-04 NOTE — TELEPHONE ENCOUNTER
ESOPHAGOGASTRODUODENOSCOPY (EGD) & COLONOSCOPY REPORT           America Yudy      1955 Age 61year old   PCP Marino Joy DO Endoscopist Nasra Ngo MD      Date of procedure: 18     Procedure: EGD w/cold biopsy & Colonoscop Views of the colon were good with washing. I then carefully withdrew the instrument from the patient who tolerated the procedure well.      Complications: None     EGD findings:       1. Esophagus:  The squamocolumnar junction was noted at 42 cm and appeare seen.  · Colonoscopy showed four polyps (s/p removal) and internal hemorrhoids. NO findings to explain change in bowels. Possibly IBS-D or medication related.      Recommend:  · Await pathology. Repeat colonoscopy in 3 years.    · Low dose imodium to help w

## 2021-11-05 NOTE — TELEPHONE ENCOUNTER
Last Procedure, Date, MD: MARIA C, 9/26/18, Dr. 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

## 2021-11-11 RX ORDER — POLYETHYLENE GLYCOL 3350, SODIUM CHLORIDE, SODIUM BICARBONATE, POTASSIUM CHLORIDE 420; 11.2; 5.72; 1.48 G/4L; G/4L; G/4L; G/4L
POWDER, FOR SOLUTION ORAL
Qty: 4000 ML | Refills: 0 | Status: SHIPPED | OUTPATIENT
Start: 2021-11-11 | End: 2022-01-10 | Stop reason: ALTCHOICE

## 2021-11-11 NOTE — TELEPHONE ENCOUNTER
Scheduling:  cljaxon w/ kenyatta w/ Dr. Nayan Linton  Dx: cln polyps  trilyte split dose sent e-scribe  Hold iron supplement 7 days prior to procedure

## 2021-11-15 ENCOUNTER — OFFICE VISIT (OUTPATIENT)
Dept: RHEUMATOLOGY | Facility: CLINIC | Age: 66
End: 2021-11-15
Payer: COMMERCIAL

## 2021-11-15 ENCOUNTER — LAB ENCOUNTER (OUTPATIENT)
Dept: LAB | Facility: HOSPITAL | Age: 66
End: 2021-11-15
Attending: INTERNAL MEDICINE
Payer: COMMERCIAL

## 2021-11-15 VITALS
DIASTOLIC BLOOD PRESSURE: 83 MMHG | WEIGHT: 194 LBS | HEART RATE: 51 BPM | HEIGHT: 67 IN | BODY MASS INDEX: 30.45 KG/M2 | SYSTOLIC BLOOD PRESSURE: 152 MMHG

## 2021-11-15 DIAGNOSIS — R53.83 FATIGUE, UNSPECIFIED TYPE: ICD-10-CM

## 2021-11-15 DIAGNOSIS — R76.8 POSITIVE ANA (ANTINUCLEAR ANTIBODY): ICD-10-CM

## 2021-11-15 DIAGNOSIS — R76.8 POSITIVE ANA (ANTINUCLEAR ANTIBODY): Primary | ICD-10-CM

## 2021-11-15 PROCEDURE — 86225 DNA ANTIBODY NATIVE: CPT

## 2021-11-15 PROCEDURE — 3079F DIAST BP 80-89 MM HG: CPT | Performed by: INTERNAL MEDICINE

## 2021-11-15 PROCEDURE — 86235 NUCLEAR ANTIGEN ANTIBODY: CPT

## 2021-11-15 PROCEDURE — 99244 OFF/OP CNSLTJ NEW/EST MOD 40: CPT | Performed by: INTERNAL MEDICINE

## 2021-11-15 PROCEDURE — 3008F BODY MASS INDEX DOCD: CPT | Performed by: INTERNAL MEDICINE

## 2021-11-15 PROCEDURE — 3077F SYST BP >= 140 MM HG: CPT | Performed by: INTERNAL MEDICINE

## 2021-11-15 PROCEDURE — 36415 COLL VENOUS BLD VENIPUNCTURE: CPT

## 2021-11-15 RX ORDER — TAMSULOSIN HYDROCHLORIDE 0.4 MG/1
0.4 CAPSULE ORAL NIGHTLY
COMMUNITY
Start: 2021-10-29

## 2021-11-15 RX ORDER — SERTRALINE HYDROCHLORIDE 100 MG/1
100 TABLET, FILM COATED ORAL DAILY
COMMUNITY
Start: 2021-01-01

## 2021-11-15 NOTE — PROGRESS NOTES
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 As you know, he is a 75-year-old gentleman who has been suffering for months, with fatigue, loss of taste, and loss of smell.   He has se drug allergies. Family history:  He has a cousin and paternal grandmother with rheumatoid arthritis. Social history:  He is single with 2 children. 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 No cigarettes.   Occasional a of taste and smell. 4.  Restless legs. 5.  Osteoarthritis. Hands. 6.  Obesity, history of gastric bypass. Obstructive sleep apnea, no longer using his CPAP machine. 7.  Anxiety and depression.       Thank you for inviting me to participate i

## 2021-11-17 NOTE — TELEPHONE ENCOUNTER
I called and left a voicemail message to schedule Colonoscopy    PHONE [facility], please transfer call to schedulers ext [phone]

## 2021-11-22 ENCOUNTER — TELEPHONE (OUTPATIENT)
Dept: RHEUMATOLOGY | Facility: CLINIC | Age: 66
End: 2021-11-22

## 2021-11-22 NOTE — TELEPHONE ENCOUNTER
I called Dontrell Mooney with his lab results from November 15th of 2021. 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 It is not causing disease, so is nothing to worry about.     His SSA, SSB, Sousa, RNP, and double-stranded DNA are nega

## 2021-11-23 NOTE — TELEPHONE ENCOUNTER
Scheduled for:  Colonoscopy 69[address]  Provider Name: Dr Nayan Linton   Date: 03/01/2022   Location:  [facility]    Sedation:  MAC  Time:  3293 (pt is aware to arrive at 1215)   Prep:  Trilyte,Sent via my chart on 11/23/2021  Meds/Allergies Reconciled?:  Physician reviewed

## 2021-12-13 NOTE — PROGRESS NOTES
[facility] AND [facility]  [address] [place] [id number]  Dept: [phone]  Loc: [phone]    07/24/18    Bariatric Initial Nutrition Assessment    Sridhar Son is a 58year old male.     Referring means of purging: no    Patient complains of: diarrhea from cow's milk    Patient's motivation for bariatric surgery: Wants to treat diabetes, has foot pain, long-term concerned about health and aging.     Allergies:  No Known Allergies    Height:  Ht Readi Results  Component Value Date   BVVT14UB 27.1 03/22/2018     No results found for: THIAMINE   No results found for: VITB1    Lab Results  Component Value Date/Time   FOLIC >91.7 15/24/4665 08:08 AM       Relevant Meds:      Current Outpatient Prescriptions Yes PM Snack     Dinner Evening Snack   Eggs, 2 toast, coffee  2 doughnut holes peach bocca burger, fries, grilled onions    Meal pattern consists of 3 meals, 1 snack and 0 protein supplements.     Total Kcal: >1800 cals  Excessive in: calories, fat, simple care  · F/U to reinforce goals  · F/U on vitamin/mineral supplementation  · Review quizzes  ·  for liquid protein diet prior to surgery    Additional RD visits required to review concepts?  Yes, to monitor goals  Patient understands protein requireme

## 2021-12-16 RX ORDER — ROPINIROLE 1 MG/1
1 TABLET, FILM COATED ORAL NIGHTLY
Qty: 90 TABLET | Refills: 1 | Status: SHIPPED | OUTPATIENT
Start: 2021-12-16 | End: 2022-04-20 | Stop reason: DRUGHIGH

## 2021-12-16 NOTE — TELEPHONE ENCOUNTER
Refill passed per [facility] protocol.      Requested Prescriptions   Pending Prescriptions Disp Refills    ROPINIROLE HCL 1 MG Oral Tab [Pharmacy Med Name: ROPINIROLE 1MG TABLETS] 90 tablet 0     Sig: TAKE 1 TABLET(1 MG) BY MOUTH EVERY NIGHT        Neurology Medications Passed - 12/16/2021  8:07 AM        Passed - Appointment in the past 6 or next 3 months                Recent Outpatient Visits              1 month ago Positive TYRESE (antinuclear antibody)    [facility], [address], Marielle Farooq MD    Office Visit    1 month ago [address] [facility] Uma Mathias MD    Office Visit    2 months ago Cough    [facility], [place] [address], [place], [place]    Office Visit    4 months ago Pre-op evaluation    [facility], [place] [address], [place], [place]    Office Visit    8 months ago Diabetes mellitus type 2, diet-controlled [facility])    [address] [place] [address], Doug Rae MD    Office Visit             Future Appointments         Provider Department Appt Notes    In 3 weeks Yordy Baez MD [address], [address], [place] POST OP F/U  [employer] PPO    In 2 months JESUS, JAZZ [facility] GI PROCEDURE Colon @ [address] [place] [address]

## 2022-01-10 ENCOUNTER — OFFICE VISIT (OUTPATIENT)
Dept: SURGERY | Facility: CLINIC | Age: 67
End: 2022-01-10
Payer: MEDICARE

## 2022-01-10 VITALS
HEART RATE: 78 BPM | HEIGHT: 67 IN | OXYGEN SATURATION: 97 % | DIASTOLIC BLOOD PRESSURE: 78 MMHG | SYSTOLIC BLOOD PRESSURE: 132 MMHG | BODY MASS INDEX: 31.23 KG/M2 | WEIGHT: 199 LBS

## 2022-01-10 DIAGNOSIS — E66.9 OBESITY (BMI 30-39.9): ICD-10-CM

## 2022-01-10 DIAGNOSIS — Z98.84 H/O GASTRIC BYPASS: ICD-10-CM

## 2022-01-10 DIAGNOSIS — E11.9 DIABETES MELLITUS TYPE 2, DIET-CONTROLLED (HCC): ICD-10-CM

## 2022-01-10 DIAGNOSIS — E55.9 VITAMIN D DEFICIENCY: ICD-10-CM

## 2022-01-10 DIAGNOSIS — E44.1 MILD PROTEIN-CALORIE MALNUTRITION (HCC): Primary | ICD-10-CM

## 2022-01-10 PROCEDURE — 99214 OFFICE O/P EST MOD 30 MIN: CPT | Performed by: INTERNAL MEDICINE

## 2022-01-10 NOTE — PROGRESS NOTES
[address] [place] [address],[address]  Dept: [phone]      Patient:  Guy Joseph  :      1955  MRN:      [id number]    Referring Provider: Germaine Quinn, Patient has no known allergies.      Social History:  Social History    Tobacco Use      Smoking status: Never Smoker      Smokeless tobacco: Never Used    Alcohol use: Not Currently      Comment: Beer & Wine Occasionally    Drug use: No    Surgical History negative  Gastrointestinal: negative  Musculoskeletal:positive for arthralgias and back pain  Neurological: negative  Behavioral/Psych: negative  Endocrine: negative     Has altered sense of taste and smell    All other systems were reviewed and are negati Question: Release to patient          Answer: Immediate          Durel Litten, MD

## 2022-01-24 ENCOUNTER — TELEPHONE (OUTPATIENT)
Dept: SURGERY | Facility: CLINIC | Age: 67
End: 2022-01-24

## 2022-01-24 NOTE — TELEPHONE ENCOUNTER
Per [facility] review, [place] prior authorization was denied. Patient does not qualify per Medicare rules. Routed to .

## 2022-02-26 ENCOUNTER — LAB ENCOUNTER (OUTPATIENT)
Dept: LAB | Facility: HOSPITAL | Age: 67
End: 2022-02-26
Attending: INTERNAL MEDICINE
Payer: MEDICARE

## 2022-02-26 DIAGNOSIS — E11.9 DIABETES MELLITUS TYPE 2, DIET-CONTROLLED (HCC): ICD-10-CM

## 2022-02-26 DIAGNOSIS — E55.9 VITAMIN D DEFICIENCY: ICD-10-CM

## 2022-02-26 DIAGNOSIS — Z01.818 PRE-OP TESTING: ICD-10-CM

## 2022-02-26 DIAGNOSIS — E44.1 MILD PROTEIN-CALORIE MALNUTRITION (HCC): ICD-10-CM

## 2022-02-26 DIAGNOSIS — E66.9 OBESITY (BMI 30-39.9): ICD-10-CM

## 2022-02-26 DIAGNOSIS — Z98.84 H/O GASTRIC BYPASS: ICD-10-CM

## 2022-02-26 LAB
SARS-COV-2 RNA RESP QL NAA+PROBE: NOT DETECTED
VIT D+METAB SERPL-MCNC: 29.6 NG/ML (ref 30–100)

## 2022-02-26 PROCEDURE — 36415 COLL VENOUS BLD VENIPUNCTURE: CPT

## 2022-02-26 PROCEDURE — 82306 VITAMIN D 25 HYDROXY: CPT

## 2022-02-26 PROCEDURE — 84425 ASSAY OF VITAMIN B-1: CPT

## 2022-03-01 ENCOUNTER — ANESTHESIA EVENT (OUTPATIENT)
Dept: ENDOSCOPY | Facility: HOSPITAL | Age: 67
End: 2022-03-01
Payer: MEDICARE

## 2022-03-01 ENCOUNTER — ANESTHESIA (OUTPATIENT)
Dept: ENDOSCOPY | Facility: HOSPITAL | Age: 67
End: 2022-03-01
Payer: MEDICARE

## 2022-03-01 ENCOUNTER — HOSPITAL ENCOUNTER (OUTPATIENT)
Facility: HOSPITAL | Age: 67
Setting detail: HOSPITAL OUTPATIENT SURGERY
Discharge: HOME | End: 2022-03-01
Attending: INTERNAL MEDICINE | Admitting: INTERNAL MEDICINE
Payer: MEDICARE

## 2022-03-01 VITALS
WEIGHT: 180 LBS | HEIGHT: 68 IN | SYSTOLIC BLOOD PRESSURE: 124 MMHG | TEMPERATURE: 97 F | HEART RATE: 63 BPM | RESPIRATION RATE: 18 BRPM | BODY MASS INDEX: 27.28 KG/M2 | DIASTOLIC BLOOD PRESSURE: 86 MMHG | OXYGEN SATURATION: 99 %

## 2022-03-01 DIAGNOSIS — Z01.818 PRE-OP TESTING: Primary | ICD-10-CM

## 2022-03-01 DIAGNOSIS — Z86.010 PERSONAL HISTORY OF COLONIC POLYPS: ICD-10-CM

## 2022-03-01 PROCEDURE — 45385 COLONOSCOPY W/LESION REMOVAL: CPT | Performed by: INTERNAL MEDICINE

## 2022-03-01 PROCEDURE — 0DBM8ZX EXCISION OF DESCENDING COLON, VIA NATURAL OR ARTIFICIAL OPENING ENDOSCOPIC, DIAGNOSTIC: ICD-10-PCS | Performed by: INTERNAL MEDICINE

## 2022-03-01 RX ORDER — LIDOCAINE HYDROCHLORIDE 10 MG/ML
INJECTION, SOLUTION EPIDURAL; INFILTRATION; INTRACAUDAL; PERINEURAL AS NEEDED
Status: DISCONTINUED | OUTPATIENT
Start: 2022-03-01 | End: 2022-03-01 | Stop reason: SURG

## 2022-03-01 RX ORDER — SODIUM CHLORIDE, SODIUM LACTATE, POTASSIUM CHLORIDE, CALCIUM CHLORIDE 600; 310; 30; 20 MG/100ML; MG/100ML; MG/100ML; MG/100ML
INJECTION, SOLUTION INTRAVENOUS CONTINUOUS
Status: DISCONTINUED | OUTPATIENT
Start: 2022-03-01 | End: 2022-03-01

## 2022-03-01 RX ADMIN — LIDOCAINE HYDROCHLORIDE 25 MG: 10 INJECTION, SOLUTION EPIDURAL; INFILTRATION; INTRACAUDAL; PERINEURAL at 13:27:00

## 2022-03-01 RX ADMIN — SODIUM CHLORIDE, SODIUM LACTATE, POTASSIUM CHLORIDE, CALCIUM CHLORIDE: 600; 310; 30; 20 INJECTION, SOLUTION INTRAVENOUS at 13:27:00

## 2022-03-01 NOTE — PRE-SEDATION ASSESSMENT
Physician Pre-Sedation Assessment    Pre-Sedation Assessment:    Sedation History: Previous Sedation with No Complications and Airway Assessed    Cardiac: normal S1, S2  Respiratory: breath sounds clear bilaterally   Abdomen: soft, BS (+), non-tender    ASA Classification: 3. A patient with a severe systemic disease.     Plan: IV Sedation

## 2022-03-01 NOTE — ANESTHESIA POSTPROCEDURE EVALUATION
Patient: Bhupinder Teran    Procedure Summary     Date: 03/01/22 Room / Location: [facility] ENDOSCOPY 01 / [facility] ENDOSCOPY    Anesthesia Start: 6769 Anesthesia Stop: 3650    Procedure: COLONOSCOPY (N/A ) Diagnosis:       Personal history of colonic polyps      (polyps, hemorrhoids)    Surgeons: Colin Albert MD Anesthesiologist: Timoteo Leroy MD    Anesthesia Type: MAC ASA Status: 3          Anesthesia Type: MAC    Vitals Value Taken Time   /74 03/01/22 1358   Temp  03/01/22 1358   Pulse 61 03/01/22 1358   Resp 14 03/01/22 1358   SpO2 98 03/01/22 1358       [facility] AN Post Evaluation:   Patient Evaluated in PACU  Patient Participation: complete - patient participated  Level of Consciousness: awake and alert  Pain Management: adequate  Airway Patency:patent  Yes    Cardiovascular Status: acceptable  Respiratory Status: acceptable  Postoperative Hydration acceptable      Brenda Oneal MD  3/1/2022 1:58 PM

## 2022-03-03 LAB — VITAMIN B1 (THIAMINE), WHOLE B: 192 NMOL/L

## 2022-03-07 ENCOUNTER — TELEPHONE (OUTPATIENT)
Dept: GASTROENTEROLOGY | Facility: CLINIC | Age: 67
End: 2022-03-07

## 2022-03-07 NOTE — TELEPHONE ENCOUNTER
Kyle Tsai MD  [facility] Clinical Staff  GI staff: please place recall for colonoscopy in 5 years       Results letter sent to patient via [employer] and also printed and mailed out to patient. Patient outreach entered for 5 year colonoscopy recall. Colonoscopy done on 3/1/2022; due on 3/1/2027. Health maintenance updated.

## 2022-03-30 ENCOUNTER — MED REC SCAN ONLY (OUTPATIENT)
Dept: FAMILY MEDICINE CLINIC | Facility: CLINIC | Age: 67
End: 2022-03-30

## 2022-04-20 ENCOUNTER — OFFICE VISIT (OUTPATIENT)
Dept: FAMILY MEDICINE CLINIC | Facility: CLINIC | Age: 67
End: 2022-04-20
Payer: MEDICARE

## 2022-04-20 VITALS
DIASTOLIC BLOOD PRESSURE: 83 MMHG | WEIGHT: 208 LBS | SYSTOLIC BLOOD PRESSURE: 144 MMHG | BODY MASS INDEX: 32 KG/M2 | TEMPERATURE: 98 F | HEART RATE: 61 BPM | RESPIRATION RATE: 16 BRPM

## 2022-04-20 DIAGNOSIS — G25.81 RLS (RESTLESS LEGS SYNDROME): Primary | ICD-10-CM

## 2022-04-20 DIAGNOSIS — R25.1 TREMORS OF NERVOUS SYSTEM: ICD-10-CM

## 2022-04-20 PROCEDURE — 99214 OFFICE O/P EST MOD 30 MIN: CPT | Performed by: FAMILY MEDICINE

## 2022-04-20 RX ORDER — PROPRANOLOL HYDROCHLORIDE 10 MG/1
TABLET ORAL
COMMUNITY
Start: 2022-04-14

## 2022-04-20 RX ORDER — ROPINIROLE 2 MG/1
2 TABLET, FILM COATED ORAL NIGHTLY
Qty: 90 TABLET | Refills: 0 | Status: SHIPPED | OUTPATIENT
Start: 2022-04-20

## 2022-04-28 RX ORDER — CYANOCOBALAMIN 500 UG/1
1 SPRAY NASAL
Qty: 4 EACH | Refills: 10 | Status: SHIPPED | OUTPATIENT
Start: 2022-04-28

## 2022-05-18 ENCOUNTER — TELEPHONE (OUTPATIENT)
Dept: SURGERY | Facility: CLINIC | Age: 67
End: 2022-05-18

## 2022-06-06 ENCOUNTER — OFFICE VISIT (OUTPATIENT)
Dept: NEUROLOGY | Facility: CLINIC | Age: 67
End: 2022-06-06
Payer: MEDICARE

## 2022-06-06 ENCOUNTER — TELEPHONE (OUTPATIENT)
Dept: PHYSICAL MEDICINE AND REHAB | Facility: CLINIC | Age: 67
End: 2022-06-06

## 2022-06-06 VITALS — DIASTOLIC BLOOD PRESSURE: 82 MMHG | SYSTOLIC BLOOD PRESSURE: 148 MMHG | HEART RATE: 76 BPM

## 2022-06-06 DIAGNOSIS — G25.0 ESSENTIAL TREMOR: ICD-10-CM

## 2022-06-06 DIAGNOSIS — M54.16 LUMBAR RADICULOPATHY, RIGHT: ICD-10-CM

## 2022-06-06 DIAGNOSIS — R13.10 DYSPHAGIA, UNSPECIFIED TYPE: ICD-10-CM

## 2022-06-06 DIAGNOSIS — R79.9 ABNORMAL FINDING OF BLOOD CHEMISTRY, UNSPECIFIED: ICD-10-CM

## 2022-06-06 DIAGNOSIS — G47.33 OSA (OBSTRUCTIVE SLEEP APNEA): ICD-10-CM

## 2022-06-06 DIAGNOSIS — H93.25: ICD-10-CM

## 2022-06-06 DIAGNOSIS — G25.81 RESTLESS LEG: Primary | ICD-10-CM

## 2022-06-06 DIAGNOSIS — G25.3 MYOCLONIC JERKING: ICD-10-CM

## 2022-06-06 RX ORDER — SERTRALINE HYDROCHLORIDE 25 MG/1
25 TABLET, FILM COATED ORAL DAILY
COMMUNITY

## 2022-06-06 RX ORDER — PROPRANOLOL HCL 60 MG
1 CAPSULE, EXTENDED RELEASE 24HR ORAL DAILY
Qty: 30 CAPSULE | Refills: 2 | Status: SHIPPED | OUTPATIENT
Start: 2022-06-06 | End: 2022-09-04

## 2022-06-06 RX ORDER — GABAPENTIN 300 MG/1
CAPSULE ORAL
Qty: 171 CAPSULE | Refills: 0 | Status: SHIPPED | OUTPATIENT
Start: 2022-06-06 | End: 2022-08-05

## 2022-06-06 NOTE — TELEPHONE ENCOUNTER
Per Medicare Guidelines -no authorization is required for imaging    Status: Approved-Covered Benefit     Patient notified via [employer] can proceed with scheduling Brain MRI CPT 96673

## 2022-07-06 DIAGNOSIS — G25.81 RESTLESS LEG: ICD-10-CM

## 2022-07-06 DIAGNOSIS — G25.0 ESSENTIAL TREMOR: ICD-10-CM

## 2022-07-06 RX ORDER — GABAPENTIN 300 MG/1
300 CAPSULE ORAL 3 TIMES DAILY
Qty: 90 CAPSULE | Refills: 5 | Status: SHIPPED | OUTPATIENT
Start: 2022-07-06

## 2022-08-16 ENCOUNTER — LAB ENCOUNTER (OUTPATIENT)
Dept: LAB | Facility: HOSPITAL | Age: 67
End: 2022-08-16
Attending: Other
Payer: MEDICARE

## 2022-08-16 DIAGNOSIS — R79.9 ABNORMAL FINDING OF BLOOD CHEMISTRY, UNSPECIFIED: ICD-10-CM

## 2022-08-16 DIAGNOSIS — G25.3 MYOCLONIC JERKING: ICD-10-CM

## 2022-08-16 DIAGNOSIS — G25.81 RESTLESS LEG: ICD-10-CM

## 2022-08-16 LAB
ALBUMIN SERPL-MCNC: 3.5 G/DL (ref 3.4–5)
ALBUMIN/GLOB SERPL: 1 {RATIO} (ref 1–2)
ALP LIVER SERPL-CCNC: 71 U/L
ALT SERPL-CCNC: 31 U/L
ANION GAP SERPL CALC-SCNC: 4 MMOL/L (ref 0–18)
AST SERPL-CCNC: 24 U/L (ref 15–37)
BILIRUB SERPL-MCNC: 1 MG/DL (ref 0.1–2)
BUN BLD-MCNC: 17 MG/DL (ref 7–18)
BUN/CREAT SERPL: 16.5 (ref 10–20)
CALCIUM BLD-MCNC: 8.7 MG/DL (ref 8.5–10.1)
CHLORIDE SERPL-SCNC: 108 MMOL/L (ref 98–112)
CO2 SERPL-SCNC: 30 MMOL/L (ref 21–32)
CREAT BLD-MCNC: 1.03 MG/DL
DEPRECATED HBV CORE AB SER IA-ACNC: 165.2 NG/ML
FASTING STATUS PATIENT QL REPORTED: NO
GFR SERPLBLD BASED ON 1.73 SQ M-ARVRAT: 80 ML/MIN/1.73M2 (ref 60–?)
GLOBULIN PLAS-MCNC: 3.5 G/DL (ref 2.8–4.4)
GLUCOSE BLD-MCNC: 71 MG/DL (ref 70–99)
IRON SATN MFR SERPL: 47 %
IRON SERPL-MCNC: 158 UG/DL
OSMOLALITY SERPL CALC.SUM OF ELEC: 294 MOSM/KG (ref 275–295)
POTASSIUM SERPL-SCNC: 3.9 MMOL/L (ref 3.5–5.1)
PROT SERPL-MCNC: 7 G/DL (ref 6.4–8.2)
SODIUM SERPL-SCNC: 142 MMOL/L (ref 136–145)
TIBC SERPL-MCNC: 335 UG/DL (ref 240–450)
TRANSFERRIN SERPL-MCNC: 225 MG/DL (ref 200–360)

## 2022-08-16 PROCEDURE — 82728 ASSAY OF FERRITIN: CPT

## 2022-08-16 PROCEDURE — 84466 ASSAY OF TRANSFERRIN: CPT

## 2022-08-16 PROCEDURE — 80053 COMPREHEN METABOLIC PANEL: CPT

## 2022-08-16 PROCEDURE — 36415 COLL VENOUS BLD VENIPUNCTURE: CPT

## 2022-08-16 PROCEDURE — 83921 ORGANIC ACID SINGLE QUANT: CPT

## 2022-08-16 PROCEDURE — 83540 ASSAY OF IRON: CPT

## 2022-08-19 LAB — MMA: 0.2 UMOL/L

## 2022-09-04 DIAGNOSIS — G25.0 ESSENTIAL TREMOR: ICD-10-CM

## 2022-09-06 NOTE — TELEPHONE ENCOUNTER
Refill request for propranolol ER 60 mg, take 1 cap daily, #30, 2 refills    LOV: 6/6/22  NOV: none  Last refilled on 6/6/22 with 2 refills

## 2022-09-07 RX ORDER — PROPRANOLOL HCL 60 MG
CAPSULE, EXTENDED RELEASE 24HR ORAL
Qty: 30 CAPSULE | Refills: 2 | Status: ON HOLD | OUTPATIENT
Start: 2022-09-07

## 2022-09-09 ENCOUNTER — ANESTHESIA (OUTPATIENT)
Dept: SURGERY | Facility: HOSPITAL | Age: 67
End: 2022-09-09
Payer: MEDICARE

## 2022-09-09 ENCOUNTER — HOSPITAL ENCOUNTER (INPATIENT)
Facility: HOSPITAL | Age: 67
LOS: 4 days | Discharge: HOME | DRG: 331 | End: 2022-09-13
Attending: EMERGENCY MEDICINE | Admitting: HOSPITALIST

## 2022-09-09 ENCOUNTER — APPOINTMENT (OUTPATIENT)
Dept: CT IMAGING | Facility: HOSPITAL | Age: 67
DRG: 331 | End: 2022-09-09
Attending: EMERGENCY MEDICINE

## 2022-09-09 ENCOUNTER — ANESTHESIA EVENT (OUTPATIENT)
Dept: SURGERY | Facility: HOSPITAL | Age: 67
End: 2022-09-09
Payer: MEDICARE

## 2022-09-09 DIAGNOSIS — K27.5 PERFORATED ULCER (HCC): Primary | ICD-10-CM

## 2022-09-09 DIAGNOSIS — Z98.84 HISTORY OF ROUX-EN-Y GASTRIC BYPASS: ICD-10-CM

## 2022-09-09 DIAGNOSIS — K63.1 BOWEL PERFORATION (HCC): Primary | ICD-10-CM

## 2022-09-09 LAB
ALBUMIN SERPL-MCNC: 3.8 G/DL (ref 3.4–5)
ALP LIVER SERPL-CCNC: 70 U/L
ALT SERPL-CCNC: 29 U/L
ANION GAP SERPL CALC-SCNC: 11 MMOL/L (ref 0–18)
AST SERPL-CCNC: 23 U/L (ref 15–37)
BASOPHILS # BLD AUTO: 0.03 X10(3) UL (ref 0–0.2)
BASOPHILS NFR BLD AUTO: 0.3 %
BILIRUB DIRECT SERPL-MCNC: 0.2 MG/DL (ref 0–0.2)
BILIRUB SERPL-MCNC: 1 MG/DL (ref 0.1–2)
BILIRUB UR QL: NEGATIVE
BUN BLD-MCNC: 19 MG/DL (ref 7–18)
BUN/CREAT SERPL: 18.4 (ref 10–20)
CALCIUM BLD-MCNC: 8.7 MG/DL (ref 8.5–10.1)
CHLORIDE SERPL-SCNC: 104 MMOL/L (ref 98–112)
CLARITY UR: CLEAR
CO2 SERPL-SCNC: 26 MMOL/L (ref 21–32)
COLOR UR: YELLOW
CREAT BLD-MCNC: 1.03 MG/DL
DEPRECATED RDW RBC AUTO: 44.4 FL (ref 35.1–46.3)
EOSINOPHIL # BLD AUTO: 0.08 X10(3) UL (ref 0–0.7)
EOSINOPHIL NFR BLD AUTO: 0.8 %
ERYTHROCYTE [DISTWIDTH] IN BLOOD BY AUTOMATED COUNT: 13 % (ref 11–15)
GFR SERPLBLD BASED ON 1.73 SQ M-ARVRAT: 80 ML/MIN/1.73M2 (ref 60–?)
GLUCOSE BLD-MCNC: 94 MG/DL (ref 70–99)
GLUCOSE UR-MCNC: NEGATIVE MG/DL
HCT VFR BLD AUTO: 50 %
HGB BLD-MCNC: 16.5 G/DL
HGB UR QL STRIP.AUTO: NEGATIVE
IMM GRANULOCYTES # BLD AUTO: 0.03 X10(3) UL (ref 0–1)
IMM GRANULOCYTES NFR BLD: 0.3 %
KETONES UR-MCNC: NEGATIVE MG/DL
LACTATE SERPL-SCNC: 1 MMOL/L (ref 0.4–2)
LEUKOCYTE ESTERASE UR QL STRIP.AUTO: NEGATIVE
LIPASE SERPL-CCNC: 106 U/L (ref 73–393)
LYMPHOCYTES # BLD AUTO: 0.55 X10(3) UL (ref 1–4)
LYMPHOCYTES NFR BLD AUTO: 5.4 %
MCH RBC QN AUTO: 30.2 PG (ref 26–34)
MCHC RBC AUTO-ENTMCNC: 33 G/DL (ref 31–37)
MCV RBC AUTO: 91.6 FL
MONOCYTES # BLD AUTO: 0.71 X10(3) UL (ref 0.1–1)
MONOCYTES NFR BLD AUTO: 7 %
NEUTROPHILS # BLD AUTO: 8.75 X10 (3) UL (ref 1.5–7.7)
NEUTROPHILS # BLD AUTO: 8.75 X10(3) UL (ref 1.5–7.7)
NEUTROPHILS NFR BLD AUTO: 86.2 %
NITRITE UR QL STRIP.AUTO: NEGATIVE
OSMOLALITY SERPL CALC.SUM OF ELEC: 294 MOSM/KG (ref 275–295)
PH UR: 6.5 [PH] (ref 5–8)
PLATELET # BLD AUTO: 105 10(3)UL (ref 150–450)
POTASSIUM SERPL-SCNC: 3.7 MMOL/L (ref 3.5–5.1)
PROT SERPL-MCNC: 7.2 G/DL (ref 6.4–8.2)
PROT UR-MCNC: NEGATIVE MG/DL
RBC # BLD AUTO: 5.46 X10(6)UL
SARS-COV-2 RNA RESP QL NAA+PROBE: NOT DETECTED
SODIUM SERPL-SCNC: 141 MMOL/L (ref 136–145)
SP GR UR STRIP: 1.02 (ref 1–1.03)
UROBILINOGEN UR STRIP-ACNC: 0.2
WBC # BLD AUTO: 10.2 X10(3) UL (ref 4–11)

## 2022-09-09 PROCEDURE — 74177 CT ABD & PELVIS W/CONTRAST: CPT | Performed by: EMERGENCY MEDICINE

## 2022-09-09 PROCEDURE — 99223 1ST HOSP IP/OBS HIGH 75: CPT | Performed by: HOSPITALIST

## 2022-09-09 PROCEDURE — 0DQA4ZZ REPAIR JEJUNUM, PERCUTANEOUS ENDOSCOPIC APPROACH: ICD-10-PCS | Performed by: SINGLE SPECIALTY

## 2022-09-09 RX ORDER — MORPHINE SULFATE 2 MG/ML
1 INJECTION, SOLUTION INTRAMUSCULAR; INTRAVENOUS EVERY 2 HOUR PRN
Status: DISCONTINUED | OUTPATIENT
Start: 2022-09-09 | End: 2022-09-09

## 2022-09-09 RX ORDER — OXYCODONE HYDROCHLORIDE 5 MG/1
5 TABLET ORAL EVERY 6 HOURS PRN
Status: DISCONTINUED | OUTPATIENT
Start: 2022-09-09 | End: 2022-09-13

## 2022-09-09 RX ORDER — SODIUM CHLORIDE, SODIUM LACTATE, POTASSIUM CHLORIDE, CALCIUM CHLORIDE 600; 310; 30; 20 MG/100ML; MG/100ML; MG/100ML; MG/100ML
INJECTION, SOLUTION INTRAVENOUS CONTINUOUS PRN
Status: DISCONTINUED | OUTPATIENT
Start: 2022-09-09 | End: 2022-09-09 | Stop reason: SURG

## 2022-09-09 RX ORDER — MORPHINE SULFATE 10 MG/ML
6 INJECTION, SOLUTION INTRAMUSCULAR; INTRAVENOUS EVERY 10 MIN PRN
Status: DISCONTINUED | OUTPATIENT
Start: 2022-09-09 | End: 2022-09-09 | Stop reason: HOSPADM

## 2022-09-09 RX ORDER — SODIUM CHLORIDE, SODIUM LACTATE, POTASSIUM CHLORIDE, CALCIUM CHLORIDE 600; 310; 30; 20 MG/100ML; MG/100ML; MG/100ML; MG/100ML
INJECTION, SOLUTION INTRAVENOUS CONTINUOUS
Status: CANCELLED | OUTPATIENT
Start: 2022-09-09

## 2022-09-09 RX ORDER — HYDROMORPHONE HYDROCHLORIDE 1 MG/ML
0.2 INJECTION, SOLUTION INTRAMUSCULAR; INTRAVENOUS; SUBCUTANEOUS EVERY 5 MIN PRN
Status: DISCONTINUED | OUTPATIENT
Start: 2022-09-09 | End: 2022-09-09 | Stop reason: HOSPADM

## 2022-09-09 RX ORDER — MORPHINE SULFATE 2 MG/ML
1 INJECTION, SOLUTION INTRAMUSCULAR; INTRAVENOUS EVERY 2 HOUR PRN
Status: DISCONTINUED | OUTPATIENT
Start: 2022-09-09 | End: 2022-09-13

## 2022-09-09 RX ORDER — MIDAZOLAM HYDROCHLORIDE 1 MG/ML
INJECTION INTRAMUSCULAR; INTRAVENOUS AS NEEDED
Status: DISCONTINUED | OUTPATIENT
Start: 2022-09-09 | End: 2022-09-09 | Stop reason: SURG

## 2022-09-09 RX ORDER — NICOTINE POLACRILEX 4 MG
30 LOZENGE BUCCAL
Status: DISCONTINUED | OUTPATIENT
Start: 2022-09-09 | End: 2022-09-09 | Stop reason: HOSPADM

## 2022-09-09 RX ORDER — GLYCOPYRROLATE 0.2 MG/ML
INJECTION, SOLUTION INTRAMUSCULAR; INTRAVENOUS AS NEEDED
Status: DISCONTINUED | OUTPATIENT
Start: 2022-09-09 | End: 2022-09-09 | Stop reason: SURG

## 2022-09-09 RX ORDER — METOCLOPRAMIDE HYDROCHLORIDE 5 MG/ML
10 INJECTION INTRAMUSCULAR; INTRAVENOUS EVERY 8 HOURS PRN
Status: DISCONTINUED | OUTPATIENT
Start: 2022-09-09 | End: 2022-09-13

## 2022-09-09 RX ORDER — MORPHINE SULFATE 2 MG/ML
2 INJECTION, SOLUTION INTRAMUSCULAR; INTRAVENOUS EVERY 2 HOUR PRN
Status: DISCONTINUED | OUTPATIENT
Start: 2022-09-09 | End: 2022-09-13

## 2022-09-09 RX ORDER — HYDROMORPHONE HYDROCHLORIDE 1 MG/ML
0.6 INJECTION, SOLUTION INTRAMUSCULAR; INTRAVENOUS; SUBCUTANEOUS EVERY 5 MIN PRN
Status: DISCONTINUED | OUTPATIENT
Start: 2022-09-09 | End: 2022-09-09 | Stop reason: HOSPADM

## 2022-09-09 RX ORDER — MORPHINE SULFATE 2 MG/ML
2 INJECTION, SOLUTION INTRAMUSCULAR; INTRAVENOUS EVERY 2 HOUR PRN
Status: DISCONTINUED | OUTPATIENT
Start: 2022-09-09 | End: 2022-09-09

## 2022-09-09 RX ORDER — ONDANSETRON 2 MG/ML
4 INJECTION INTRAMUSCULAR; INTRAVENOUS EVERY 6 HOURS PRN
Status: DISCONTINUED | OUTPATIENT
Start: 2022-09-09 | End: 2022-09-09

## 2022-09-09 RX ORDER — MORPHINE SULFATE 4 MG/ML
2 INJECTION, SOLUTION INTRAMUSCULAR; INTRAVENOUS EVERY 10 MIN PRN
Status: DISCONTINUED | OUTPATIENT
Start: 2022-09-09 | End: 2022-09-09 | Stop reason: HOSPADM

## 2022-09-09 RX ORDER — NEOSTIGMINE METHYLSULFATE 1 MG/ML
INJECTION, SOLUTION INTRAVENOUS AS NEEDED
Status: DISCONTINUED | OUTPATIENT
Start: 2022-09-09 | End: 2022-09-09 | Stop reason: SURG

## 2022-09-09 RX ORDER — LIDOCAINE HYDROCHLORIDE 10 MG/ML
INJECTION, SOLUTION EPIDURAL; INFILTRATION; INTRACAUDAL; PERINEURAL AS NEEDED
Status: DISCONTINUED | OUTPATIENT
Start: 2022-09-09 | End: 2022-09-09 | Stop reason: SURG

## 2022-09-09 RX ORDER — MORPHINE SULFATE 2 MG/ML
2 INJECTION, SOLUTION INTRAMUSCULAR; INTRAVENOUS ONCE
Status: COMPLETED | OUTPATIENT
Start: 2022-09-09 | End: 2022-09-09

## 2022-09-09 RX ORDER — SODIUM CHLORIDE 9 MG/ML
INJECTION, SOLUTION INTRAVENOUS CONTINUOUS
Status: ACTIVE | OUTPATIENT
Start: 2022-09-09 | End: 2022-09-09

## 2022-09-09 RX ORDER — DEXTROSE MONOHYDRATE 25 G/50ML
50 INJECTION, SOLUTION INTRAVENOUS
Status: DISCONTINUED | OUTPATIENT
Start: 2022-09-09 | End: 2022-09-09 | Stop reason: HOSPADM

## 2022-09-09 RX ORDER — DEXTROSE, SODIUM CHLORIDE, AND POTASSIUM CHLORIDE 5; .45; .15 G/100ML; G/100ML; G/100ML
INJECTION INTRAVENOUS CONTINUOUS
Status: DISCONTINUED | OUTPATIENT
Start: 2022-09-09 | End: 2022-09-13

## 2022-09-09 RX ORDER — ROCURONIUM BROMIDE 10 MG/ML
INJECTION, SOLUTION INTRAVENOUS AS NEEDED
Status: DISCONTINUED | OUTPATIENT
Start: 2022-09-09 | End: 2022-09-09 | Stop reason: SURG

## 2022-09-09 RX ORDER — NALOXONE HYDROCHLORIDE 0.4 MG/ML
80 INJECTION, SOLUTION INTRAMUSCULAR; INTRAVENOUS; SUBCUTANEOUS AS NEEDED
Status: DISCONTINUED | OUTPATIENT
Start: 2022-09-09 | End: 2022-09-09 | Stop reason: HOSPADM

## 2022-09-09 RX ORDER — ENOXAPARIN SODIUM 100 MG/ML
40 INJECTION SUBCUTANEOUS DAILY
Status: DISCONTINUED | OUTPATIENT
Start: 2022-09-10 | End: 2022-09-11

## 2022-09-09 RX ORDER — ONDANSETRON 2 MG/ML
4 INJECTION INTRAMUSCULAR; INTRAVENOUS EVERY 6 HOURS PRN
Status: DISCONTINUED | OUTPATIENT
Start: 2022-09-09 | End: 2022-09-13

## 2022-09-09 RX ORDER — HYDROMORPHONE HYDROCHLORIDE 1 MG/ML
0.4 INJECTION, SOLUTION INTRAMUSCULAR; INTRAVENOUS; SUBCUTANEOUS EVERY 5 MIN PRN
Status: DISCONTINUED | OUTPATIENT
Start: 2022-09-09 | End: 2022-09-09 | Stop reason: HOSPADM

## 2022-09-09 RX ORDER — MORPHINE SULFATE 4 MG/ML
4 INJECTION, SOLUTION INTRAMUSCULAR; INTRAVENOUS EVERY 10 MIN PRN
Status: DISCONTINUED | OUTPATIENT
Start: 2022-09-09 | End: 2022-09-09 | Stop reason: HOSPADM

## 2022-09-09 RX ORDER — SODIUM CHLORIDE, SODIUM LACTATE, POTASSIUM CHLORIDE, CALCIUM CHLORIDE 600; 310; 30; 20 MG/100ML; MG/100ML; MG/100ML; MG/100ML
INJECTION, SOLUTION INTRAVENOUS CONTINUOUS
Status: DISCONTINUED | OUTPATIENT
Start: 2022-09-09 | End: 2022-09-09 | Stop reason: HOSPADM

## 2022-09-09 RX ORDER — MORPHINE SULFATE 4 MG/ML
4 INJECTION, SOLUTION INTRAMUSCULAR; INTRAVENOUS EVERY 2 HOUR PRN
Status: DISCONTINUED | OUTPATIENT
Start: 2022-09-09 | End: 2022-09-09

## 2022-09-09 RX ORDER — MORPHINE SULFATE 4 MG/ML
4 INJECTION, SOLUTION INTRAMUSCULAR; INTRAVENOUS EVERY 2 HOUR PRN
Status: DISCONTINUED | OUTPATIENT
Start: 2022-09-09 | End: 2022-09-13

## 2022-09-09 RX ORDER — ACETAMINOPHEN 160 MG/5ML
1000 SOLUTION ORAL EVERY 8 HOURS
Status: DISCONTINUED | OUTPATIENT
Start: 2022-09-09 | End: 2022-09-13

## 2022-09-09 RX ORDER — DEXAMETHASONE SODIUM PHOSPHATE 4 MG/ML
VIAL (ML) INJECTION AS NEEDED
Status: DISCONTINUED | OUTPATIENT
Start: 2022-09-09 | End: 2022-09-09 | Stop reason: SURG

## 2022-09-09 RX ORDER — NICOTINE POLACRILEX 4 MG
15 LOZENGE BUCCAL
Status: DISCONTINUED | OUTPATIENT
Start: 2022-09-09 | End: 2022-09-09 | Stop reason: HOSPADM

## 2022-09-09 RX ORDER — ONDANSETRON 2 MG/ML
INJECTION INTRAMUSCULAR; INTRAVENOUS AS NEEDED
Status: DISCONTINUED | OUTPATIENT
Start: 2022-09-09 | End: 2022-09-09 | Stop reason: SURG

## 2022-09-09 RX ORDER — BUPIVACAINE HYDROCHLORIDE 2.5 MG/ML
INJECTION, SOLUTION EPIDURAL; INFILTRATION; INTRACAUDAL AS NEEDED
Status: DISCONTINUED | OUTPATIENT
Start: 2022-09-09 | End: 2022-09-09 | Stop reason: HOSPADM

## 2022-09-09 RX ADMIN — ROCURONIUM BROMIDE 5 MG: 10 INJECTION, SOLUTION INTRAVENOUS at 13:03:00

## 2022-09-09 RX ADMIN — NEOSTIGMINE METHYLSULFATE 5 MG: 1 INJECTION, SOLUTION INTRAVENOUS at 14:00:00

## 2022-09-09 RX ADMIN — SODIUM CHLORIDE, SODIUM LACTATE, POTASSIUM CHLORIDE, CALCIUM CHLORIDE: 600; 310; 30; 20 INJECTION, SOLUTION INTRAVENOUS at 13:54:00

## 2022-09-09 RX ADMIN — MIDAZOLAM HYDROCHLORIDE 2 MG: 1 INJECTION INTRAMUSCULAR; INTRAVENOUS at 12:59:00

## 2022-09-09 RX ADMIN — ROCURONIUM BROMIDE 30 MG: 10 INJECTION, SOLUTION INTRAVENOUS at 13:20:00

## 2022-09-09 RX ADMIN — DEXAMETHASONE SODIUM PHOSPHATE 4 MG: 4 MG/ML VIAL (ML) INJECTION at 13:12:00

## 2022-09-09 RX ADMIN — LIDOCAINE HYDROCHLORIDE 50 MG: 10 INJECTION, SOLUTION EPIDURAL; INFILTRATION; INTRACAUDAL; PERINEURAL at 13:04:00

## 2022-09-09 RX ADMIN — SODIUM CHLORIDE, SODIUM LACTATE, POTASSIUM CHLORIDE, CALCIUM CHLORIDE: 600; 310; 30; 20 INJECTION, SOLUTION INTRAVENOUS at 12:59:00

## 2022-09-09 RX ADMIN — ONDANSETRON 4 MG: 2 INJECTION INTRAMUSCULAR; INTRAVENOUS at 13:12:00

## 2022-09-09 RX ADMIN — GLYCOPYRROLATE 0.6 MG: 0.2 INJECTION, SOLUTION INTRAMUSCULAR; INTRAVENOUS at 14:00:00

## 2022-09-09 NOTE — OPERATIVE REPORT
Operative Note  [facility] BARIATRIC/WT MGMT    Patient Name: Venice Rodrigues    Procedure Date: 2022    : 1955    MRN: [id number]    Preoperative Diagnosis: Perforated ulcer ([address] [K27.5]    Postoperative Diagnosis: Perforated ulcer ([address] [K27.5]    Surgeon(s) and Role:     Lauren Kate MD - Primary  Assistant Mehul Marcelo      Procedure Performed:  DIAGNOSTIC LAPAROSCOPY, REPAIR OF MARGINAL ULCER    Anesthesia: General    Complications: no    Specimens: none    Operative Summary:   The patient was taken to the operating room and placed in a supine fashion with arms on arm boards bilaterally. A general anesthetic was administered and the patient was intubated without incident. Preoperative antibiotics had been given in ER. The abdomen was prepped and draped in the usual sterile fashion and a timeout was performed. I then placed an 11 visiport in the left paramedian position through the prior scar without difficulty. The initial diagnostic laparoscopy revealed no trocar injury. I placed two additional 5 mm ports bilaterally in the lateral subcostal regions and another 11 mm port in the right paramedian position, all through prior scars. I was pleased that there was essentially no contamination grossly visible. There was some adherent omental fat on top of the Andres limb and peeling this back revealed a perforated marginal ulcer in the typical location. This was approximately 5 mm in size. I primarily closed the ulcer using 2 interrupted 2-0 Vicryl sutures. Omental fat was then sewn on top of this using 3-0 silk in 3 locations. I was quite pleased with the coverage. Cursory examination and the rest of his abdomen was unremarkable. A 10 flat POLINA drain was trimmed and then placed across the repair. The end was brought out from the left lateral 5 mm trocar site and secured to the skin with 3-0 nylon. The abdomen was desufflated and ports removed.   The skin of all incisions was closed with 4-0 Vicryl. Skin glue was applied. A drain dressing was applied. The patient tolerated the procedure well. All sponge and instrument counts were correct.     Rosendo Durant MD 09/09/22

## 2022-09-09 NOTE — ANESTHESIA PROCEDURE NOTES
Airway  Date/Time: 9/9/2022 1:07 PM  Urgency: Elective      General Information and Staff    Patient location during procedure: OR  Anesthesiologist: Nico Ornelas MD  Resident/CRNA: Mukesh Mir CRNA  Performed: CRNA     Indications and Patient Condition  Indications for airway management: anesthesia  Sedation level: deep  Preoxygenated: yes  Patient position: sniffing  Mask difficulty assessment: 1 - vent by mask    Final Airway Details  Final airway type: endotracheal airway      Successful airway: ETT  Cuffed: yes   Successful intubation technique: Video laryngoscopy  Facilitating devices/methods: intubating stylet and cricoid pressure  Endotracheal tube insertion site: oral  Blade: GlideScope  Blade size: #4  ETT size (mm): 7.5    Cormack-Lehane Classification: grade I - full view of glottis  Placement verified by: chest auscultation and capnometry   Measured from: lips  ETT to lips (cm): 22  Number of attempts at approach: 1    Additional Comments  RSI with glidescope intubation, cricoid held throughout until ETT position confirmed with +ETCO2 and BBS. ETT advanced easily with no resistance.

## 2022-09-09 NOTE — ED INITIAL ASSESSMENT (HPI)
Pt to rm 23 from triage and triaged at bedside for complaint of mid abdominal pain that radiates to leftt upper arm

## 2022-09-09 NOTE — H&P
[facility]    PATIENT'S NAME: Kenton Mcdowell   ATTENDING PHYSICIAN: Marya Valente. Katherine Vasquez MD   PATIENT ACCOUNT#:   [id number]    LOCATION:  [address]  MEDICAL RECORD #:   [id number]       YOB: 1955  ADMISSION DATE:       09/09/2022    HISTORY AND PHYSICAL EXAMINATION    CHIEF COMPLAINT:  Perforated marginal versus peptic ulcer disease. 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 this morning, which woke him up from sleep, associated with nausea. CBC showed a white blood cell count of 10.2 with left shift, platelet count 022. Chemistry, liver function tests, urinalysis, and COVID testing were negative. CT scan of the abdomen showed postop changes of Andres-en-Y gastrojejunostomy, small focal ulceration just distal to the gastrojejunostomy anastomosis with extraluminal pneumoperitoneum in the left upper quadrant, minimal induration involving the adjacent soft tissues. Patient was started on IV Zosyn, Protonix, pain control. He will be admitted to the hospital for further management. PAST MEDICAL HISTORY:  Morbid obesity, obstructive sleep apnea, enlarged prostate, hypertension, DVT, anxiety, and ADHD. PAST SURGICAL HISTORY:  Laparoscopic Andres-en-Y gastric bypass surgery in January 2019, tracheostomy as a child, tonsillectomy, and appendectomy. MEDICATIONS:  Please see medication reconciliation list.    ALLERGIES:  No known drug allergies. FAMILY HISTORY:  Mother had diabetes mellitus type 2, hypertension, and cerebrovascular accident. Father had heart disease. SOCIAL HISTORY:  No tobacco or drug use. Occasional alcohol. Lives with his family. Independent in his basic activities of daily living.       REVIEW OF SYSTEMS:  Patient said he has been feeling body aches and bilateral shoulder discomfort for the last 2 to 3 days with decreased appetite. Last night he had epigastric intense pain associated with nausea. He felt subjective fevers and chills, but no recorded fever. Other 12-point review of systems negative. PHYSICAL EXAMINATION:    GENERAL:   Alert and oriented to time, place, and person. Mild to moderate distress. VITAL SIGNS:  Temperature 97.8, pulse 87, respiratory rate 18, blood pressure 161/94, pulse oximetry 97% on room air. HEENT:  Atraumatic. Oropharynx clear. Dry mucous membranes. Normal hard and soft palate. Eyes:  Anicteric sclerae. NECK:  Supple. No lymphadenopathy. Trachea midline. Full range of motion. LUNGS:  Clear to auscultation bilaterally. Normal respiratory effort. HEART:  Regular rate and rhythm. S1 and S2 auscultated. No murmur. ABDOMEN:  Soft. Tenderness noted, midline epigastric and left paramidline with no guarding. No rebound tenderness. Hypoactive bowel sounds. EXTREMITIES:  No peripheral edema, clubbing, or cyanosis. NEUROLOGIC:  Motor and sensory intact. Cranial nerves II through XII are intact. ASSESSMENT AND PLAN:    1. Abdominal pain. Ulceration distal to the gastrojejunostomy anastomosis with possible localized perforation. 2.   Hypertension. Patient will be admitted to general medical floor. IV fluids, pain control, antibiotics, Protonix. Bariatric Surgery consult. N.p.o.  Further recommendations to follow.     Dictated By Enmanuel Chatman MD  d: 09/09/2022 10:54:17  t: 09/09/2022 11:48:50  Job [id number]  QW/

## 2022-09-10 LAB
ANION GAP SERPL CALC-SCNC: 5 MMOL/L (ref 0–18)
BASOPHILS # BLD AUTO: 0.01 X10(3) UL (ref 0–0.2)
BASOPHILS NFR BLD AUTO: 0.1 %
BUN BLD-MCNC: 13 MG/DL (ref 7–18)
BUN/CREAT SERPL: 12.4 (ref 10–20)
CALCIUM BLD-MCNC: 8.1 MG/DL (ref 8.5–10.1)
CHLORIDE SERPL-SCNC: 109 MMOL/L (ref 98–112)
CO2 SERPL-SCNC: 27 MMOL/L (ref 21–32)
CREAT BLD-MCNC: 1.05 MG/DL
DEPRECATED RDW RBC AUTO: 44.2 FL (ref 35.1–46.3)
EOSINOPHIL # BLD AUTO: 0.01 X10(3) UL (ref 0–0.7)
EOSINOPHIL NFR BLD AUTO: 0.1 %
ERYTHROCYTE [DISTWIDTH] IN BLOOD BY AUTOMATED COUNT: 13.1 % (ref 11–15)
GFR SERPLBLD BASED ON 1.73 SQ M-ARVRAT: 78 ML/MIN/1.73M2 (ref 60–?)
GLUCOSE BLD-MCNC: 109 MG/DL (ref 70–99)
HCT VFR BLD AUTO: 42.4 %
HGB BLD-MCNC: 13.8 G/DL
IMM GRANULOCYTES # BLD AUTO: 0.03 X10(3) UL (ref 0–1)
IMM GRANULOCYTES NFR BLD: 0.3 %
LYMPHOCYTES # BLD AUTO: 0.77 X10(3) UL (ref 1–4)
LYMPHOCYTES NFR BLD AUTO: 6.9 %
MCH RBC QN AUTO: 29.9 PG (ref 26–34)
MCHC RBC AUTO-ENTMCNC: 32.5 G/DL (ref 31–37)
MCV RBC AUTO: 91.8 FL
MONOCYTES # BLD AUTO: 1.1 X10(3) UL (ref 0.1–1)
MONOCYTES NFR BLD AUTO: 9.8 %
NEUTROPHILS # BLD AUTO: 9.26 X10 (3) UL (ref 1.5–7.7)
NEUTROPHILS # BLD AUTO: 9.26 X10(3) UL (ref 1.5–7.7)
NEUTROPHILS NFR BLD AUTO: 82.8 %
OSMOLALITY SERPL CALC.SUM OF ELEC: 293 MOSM/KG (ref 275–295)
PLATELET # BLD AUTO: 88 10(3)UL (ref 150–450)
POTASSIUM SERPL-SCNC: 4 MMOL/L (ref 3.5–5.1)
RBC # BLD AUTO: 4.62 X10(6)UL
SODIUM SERPL-SCNC: 141 MMOL/L (ref 136–145)
WBC # BLD AUTO: 11.2 X10(3) UL (ref 4–11)

## 2022-09-10 PROCEDURE — 99233 SBSQ HOSP IP/OBS HIGH 50: CPT | Performed by: HOSPITALIST

## 2022-09-10 RX ORDER — GABAPENTIN 300 MG/1
300 CAPSULE ORAL 3 TIMES DAILY
Status: DISCONTINUED | OUTPATIENT
Start: 2022-09-10 | End: 2022-09-13

## 2022-09-10 RX ORDER — TAMSULOSIN HYDROCHLORIDE 0.4 MG/1
0.4 CAPSULE ORAL NIGHTLY
Status: DISCONTINUED | OUTPATIENT
Start: 2022-09-10 | End: 2022-09-13

## 2022-09-10 RX ORDER — ROPINIROLE 1 MG/1
2 TABLET, FILM COATED ORAL NIGHTLY
Status: DISCONTINUED | OUTPATIENT
Start: 2022-09-10 | End: 2022-09-11

## 2022-09-10 RX ORDER — BUPROPION HYDROCHLORIDE 100 MG/1
100 TABLET ORAL 3 TIMES DAILY
Status: DISCONTINUED | OUTPATIENT
Start: 2022-09-10 | End: 2022-09-11

## 2022-09-10 NOTE — PLAN OF CARE
Patient alert and oriented. POD #0, 3 lapsites to abdomen. POLINA drain in place. Receiving IV fluids and abx per MD order. Room air. Remote tele in place. SCDs and lovenox for DVT prophy. Oxy provided as needed for pain. Bed rest with bathroom privelages. Fall precautions maintained. Frequent rounding by nursing staff. Patient NPO, ok to take oral tylenol and meds with sips of water. Plan TBD.     Problem: Patient Centered Care  Goal: Patient preferences are identified and integrated in the patient's plan of care  Description: Interventions:  - What would you like us to know as we care for you?   - Provide timely, complete, and accurate information to patient/family  - Incorporate patient and family knowledge, values, beliefs, and cultural backgrounds into the planning and delivery of care  - Encourage patient/family to participate in care and decision-making at the level they choose  - Honor patient and family perspectives and choices  Outcome: Progressing     Problem: PAIN - ADULT  Goal: Verbalizes/displays adequate comfort level or patient's stated pain goal  Description: INTERVENTIONS:  - Encourage pt to monitor pain and request assistance  - Assess pain using appropriate pain scale  - Administer analgesics based on type and severity of pain and evaluate response  - Implement non-pharmacological measures as appropriate and evaluate response  - Consider cultural and social influences on pain and pain management  - Manage/alleviate anxiety  - Utilize distraction and/or relaxation techniques  - Monitor for opioid side effects  - Notify MD/LIP if interventions unsuccessful or patient reports new pain  - Anticipate increased pain with activity and pre-medicate as appropriate  Outcome: Progressing     Problem: RISK FOR INFECTION - ADULT  Goal: Absence of fever/infection during anticipated neutropenic period  Description: INTERVENTIONS  - Monitor WBC  - Administer growth factors as ordered  - Implement neutropenic guidelines  Outcome: Progressing     Problem: SAFETY ADULT - FALL  Goal: Free from fall injury  Description: INTERVENTIONS:  - Assess pt frequently for physical needs  - Identify cognitive and physical deficits and behaviors that affect risk of falls.   - [place] fall precautions as indicated by assessment.  - Educate pt/family on patient safety including physical limitations  - Instruct pt to call for assistance with activity based on assessment  - Modify environment to reduce risk of injury  - Provide assistive devices as appropriate  - Consider OT/PT consult to assist with strengthening/mobility  - Encourage toileting schedule  Outcome: Progressing     Problem: DISCHARGE PLANNING  Goal: Discharge to home or other facility with appropriate resources  Description: INTERVENTIONS:  - Identify barriers to discharge w/pt and caregiver  - Include patient/family/discharge partner in discharge planning  - Arrange for needed discharge resources and transportation as appropriate  - Identify discharge learning needs (meds, wound care, etc)  - Arrange for interpreters to assist at discharge as needed  - Consider post-discharge preferences of patient/family/discharge partner  - Complete POLST form as appropriate  - Assess patient's ability to be responsible for managing their own health  - Refer to Case Management Department for coordinating discharge planning if the patient needs post-hospital services based on physician/LIP order or complex needs related to functional status, cognitive ability or social support system  Outcome: Progressing

## 2022-09-10 NOTE — PLAN OF CARE
Sherice Horton is post op day #1 S/P ulcer repair. He is alert and 0x4, NPO status maintained per orders. IV fluids maintained. 3 lap sites ALLEN- gauze and tegaderm to POLINA drain site dry and intact- 40 ml output this shift. Voids freely- ambulating in halls. Pain managed with Oxy IR PRN. IV Zosyn as ordered. Plan for Upper GI Monday per surgery. Tele box 56- three calls from tele HR in 39-40's but asymptomatic and recovers into 70's when standing. MD notified- orders for EKG. Orders entered. [facility]- has hearing aids- glassess, on lovenox and scd's for DVT Proph. Plan is home when medically cleared  Problem: PAIN - ADULT  Goal: Verbalizes/displays adequate comfort level or patient's stated pain goal  Description: INTERVENTIONS:  - Encourage pt to monitor pain and request assistance  - Assess pain using appropriate pain scale  - Administer analgesics based on type and severity of pain and evaluate response  - Implement non-pharmacological measures as appropriate and evaluate response  - Consider cultural and social influences on pain and pain management  - Manage/alleviate anxiety  - Utilize distraction and/or relaxation techniques  - Monitor for opioid side effects  - Notify MD/LIP if interventions unsuccessful or patient reports new pain  - Anticipate increased pain with activity and pre-medicate as appropriate  Outcome: Progressing     Problem: RISK FOR INFECTION - ADULT  Goal: Absence of fever/infection during anticipated neutropenic period  Description: INTERVENTIONS  - Monitor WBC  - Administer growth factors as ordered  - Implement neutropenic guidelines  Outcome: Progressing     Problem: SAFETY ADULT - FALL  Goal: Free from fall injury  Description: INTERVENTIONS:  - Assess pt frequently for physical needs  - Identify cognitive and physical deficits and behaviors that affect risk of falls.   - [place] fall precautions as indicated by assessment.  - Educate pt/family on patient safety including physical limitations  - Instruct pt to call for assistance with activity based on assessment  - Modify environment to reduce risk of injury  - Provide assistive devices as appropriate  - Consider OT/PT consult to assist with strengthening/mobility  - Encourage toileting schedule  Outcome: Progressing     Problem: GASTROINTESTINAL - ADULT  Goal: Achieves appropriate nutritional intake (bariatric)  Description: INTERVENTIONS:  - Monitor for over-consumption  - Identify factors contributing to increased intake, treat as appropriate  - Monitor I&O, WT and lab values  - Obtain nutritional consult as needed  - Evaluate psychosocial factors contributing to over-consumption  Outcome: Progressing     Problem: SKIN/TISSUE INTEGRITY - ADULT  Goal: Skin integrity remains intact  Description: INTERVENTIONS  - Assess and document risk factors for pressure ulcer development  - Assess and document skin integrity  - Monitor for areas of redness and/or skin breakdown  - Initiate interventions, skin care algorithm/standards of care as needed  Outcome: Progressing

## 2022-09-10 NOTE — PLAN OF CARE
Problem: Patient Centered Care  Goal: Patient preferences are identified and integrated in the patient's plan of care  Description: Interventions:  - What would you like us to know as we care for you? I've been better  - Provide timely, complete, and accurate information to patient/family  - Incorporate patient and family knowledge, values, beliefs, and cultural backgrounds into the planning and delivery of care  - Encourage patient/family to participate in care and decision-making at the level they choose  - Honor patient and family perspectives and choices  Outcome: Progressing     Problem: PAIN - ADULT  Goal: Verbalizes/displays adequate comfort level or patient's stated pain goal  Description: INTERVENTIONS:  - Encourage pt to monitor pain and request assistance  - Assess pain using appropriate pain scale  - Administer analgesics based on type and severity of pain and evaluate response  - Implement non-pharmacological measures as appropriate and evaluate response  - Consider cultural and social influences on pain and pain management  - Manage/alleviate anxiety  - Utilize distraction and/or relaxation techniques  - Monitor for opioid side effects  - Notify MD/LIP if interventions unsuccessful or patient reports new pain  - Anticipate increased pain with activity and pre-medicate as appropriate  Outcome: Progressing  Note: C/o bilateral posterior shoulder pain and surgical abdominal pain. Khushboo Williamson states shoulder pain more bothersome than abdominal pain. Using ice packs to shoulders and abdomen. On scheduled liquid Tylenol. Also using PRN iV morphine with some relief. Problem: RISK FOR INFECTION - ADULT  Goal: Absence of fever/infection during anticipated neutropenic period  Description: INTERVENTIONS  - Monitor WBC  - Administer growth factors as ordered  - Implement neutropenic guidelines  Outcome: Progressing  Note: Last temp 99. Last CBC 10.2.  CBC ordered for am. Continues on IV Zosyn Q 8 Hr     Problem: SAFETY ADULT - FALL  Goal: Free from fall injury  Description: INTERVENTIONS:  - Assess pt frequently for physical needs  - Identify cognitive and physical deficits and behaviors that affect risk of falls. - [place] fall precautions as indicated by assessment.  - Educate pt/family on patient safety including physical limitations  - Instruct pt to call for assistance with activity based on assessment  - Modify environment to reduce risk of injury  - Provide assistive devices as appropriate  - Consider OT/PT consult to assist with strengthening/mobility  - Encourage toileting schedule  Outcome: Progressing  Note: Reviewed safety plan with Daine Sever . Room close to nurses station. Bed alarm active. He is up with standby assistance. Problem: DISCHARGE PLANNING  Goal: Discharge to home or other facility with appropriate resources  Description: INTERVENTIONS:  - Identify barriers to discharge w/pt and caregiver  - Include patient/family/discharge partner in discharge planning  - Arrange for needed discharge resources and transportation as appropriate  - Identify discharge learning needs (meds, wound care, etc)  - Arrange for interpreters to assist at discharge as needed  - Consider post-discharge preferences of patient/family/discharge partner  - Complete POLST form as appropriate  - Assess patient's ability to be responsible for managing their own health  - Refer to Case Management Department for coordinating discharge planning if the patient needs post-hospital services based on physician/LIP order or complex needs related to functional status, cognitive ability or social support system  Outcome: Progressing  Note: DC plan to home.  Date TBD     Problem: GASTROINTESTINAL - ADULT  Goal: Achieves appropriate nutritional intake (bariatric)  Description: INTERVENTIONS:  - Monitor for over-consumption  - Identify factors contributing to increased intake, treat as appropriate  - Monitor I&O, WT and lab values  - Obtain nutritional consult as needed  - Evaluate psychosocial factors contributing to over-consumption  Outcome: Progressing  Note: NPO except for meds at this time. Problem: SKIN/TISSUE INTEGRITY - ADULT  Goal: Skin integrity remains intact  Description: INTERVENTIONS  - Assess and document risk factors for pressure ulcer development  - Assess and document skin integrity  - Monitor for areas of redness and/or skin breakdown  - Initiate interventions, skin care algorithm/standards of care as needed  Outcome: Progressing  Note: Abdominal lap sites to abdomen clean dry & intact. POLINA site to left abdomen, clean dry & intact.    Goal: Incision(s), wounds(s) or drain site(s) healing without S/S of infection  Description: INTERVENTIONS:  - Assess and document risk factors for pressure ulcer development  - Assess and document skin integrity  - Assess and document dressing/incision, wound bed, drain sites and surrounding tissue  - Implement wound care per orders  - Initiate isolation precautions as appropriate  - Initiate Pressure Ulcer prevention bundle as indicated  Outcome: Progressing

## 2022-09-11 LAB
ANION GAP SERPL CALC-SCNC: 6 MMOL/L (ref 0–18)
BUN BLD-MCNC: 8 MG/DL (ref 7–18)
BUN/CREAT SERPL: 7.7 (ref 10–20)
CALCIUM BLD-MCNC: 8.3 MG/DL (ref 8.5–10.1)
CHLORIDE SERPL-SCNC: 110 MMOL/L (ref 98–112)
CO2 SERPL-SCNC: 27 MMOL/L (ref 21–32)
CREAT BLD-MCNC: 1.04 MG/DL
DEPRECATED RDW RBC AUTO: 45.1 FL (ref 35.1–46.3)
ERYTHROCYTE [DISTWIDTH] IN BLOOD BY AUTOMATED COUNT: 13.2 % (ref 11–15)
GFR SERPLBLD BASED ON 1.73 SQ M-ARVRAT: 79 ML/MIN/1.73M2 (ref 60–?)
GLUCOSE BLD-MCNC: 95 MG/DL (ref 70–99)
HCT VFR BLD AUTO: 42.3 %
HGB BLD-MCNC: 13.7 G/DL
MCH RBC QN AUTO: 30 PG (ref 26–34)
MCHC RBC AUTO-ENTMCNC: 32.4 G/DL (ref 31–37)
MCV RBC AUTO: 92.6 FL
OSMOLALITY SERPL CALC.SUM OF ELEC: 294 MOSM/KG (ref 275–295)
PLATELET # BLD AUTO: 79 10(3)UL (ref 150–450)
POTASSIUM SERPL-SCNC: 4 MMOL/L (ref 3.5–5.1)
RBC # BLD AUTO: 4.57 X10(6)UL
SODIUM SERPL-SCNC: 143 MMOL/L (ref 136–145)
T4 FREE SERPL-MCNC: 1.3 NG/DL (ref 0.8–1.7)
TSI SER-ACNC: 6.8 MIU/ML (ref 0.36–3.74)
WBC # BLD AUTO: 6.6 X10(3) UL (ref 4–11)

## 2022-09-11 PROCEDURE — 99233 SBSQ HOSP IP/OBS HIGH 50: CPT | Performed by: HOSPITALIST

## 2022-09-11 RX ORDER — BUPROPION HYDROCHLORIDE 100 MG/1
100 TABLET ORAL 3 TIMES DAILY
Status: DISCONTINUED | OUTPATIENT
Start: 2022-09-12 | End: 2022-09-13

## 2022-09-11 NOTE — PLAN OF CARE
Tiago Montoya is post op day #2 S/P ulcer repair. He is alert and 0x4, NPO status maintained per orders. IV fluids maintained. 3 lap sites ALLEN- gauze and tegaderm to POLINA drain site dry and intact- 40 ml output this shift. Voids freely- ambulating in halls. Pain managed with Oxy IR PRN. IV Zosyn as ordered. Plan for Upper GI Monday per surgery. Tele box 64- [place]- has hearing aids- glassess, on lovenox and scd's for DVT Proph. Plan is home when medically cleared. Problem: PAIN - ADULT  Goal: Verbalizes/displays adequate comfort level or patient's stated pain goal  Description: INTERVENTIONS:  - Encourage pt to monitor pain and request assistance  - Assess pain using appropriate pain scale  - Administer analgesics based on type and severity of pain and evaluate response  - Implement non-pharmacological measures as appropriate and evaluate response  - Consider cultural and social influences on pain and pain management  - Manage/alleviate anxiety  - Utilize distraction and/or relaxation techniques  - Monitor for opioid side effects  - Notify MD/LIP if interventions unsuccessful or patient reports new pain  - Anticipate increased pain with activity and pre-medicate as appropriate  Outcome: Progressing     Problem: RISK FOR INFECTION - ADULT  Goal: Absence of fever/infection during anticipated neutropenic period  Description: INTERVENTIONS  - Monitor WBC  - Administer growth factors as ordered  - Implement neutropenic guidelines  Outcome: Progressing     Problem: SAFETY ADULT - FALL  Goal: Free from fall injury  Description: INTERVENTIONS:  - Assess pt frequently for physical needs  - Identify cognitive and physical deficits and behaviors that affect risk of falls.   - [place] fall precautions as indicated by assessment.  - Educate pt/family on patient safety including physical limitations  - Instruct pt to call for assistance with activity based on assessment  - Modify environment to reduce risk of injury  - Provide assistive devices as appropriate  - Consider OT/PT consult to assist with strengthening/mobility  - Encourage toileting schedule  Outcome: Progressing     Problem: DISCHARGE PLANNING  Goal: Discharge to home or other facility with appropriate resources  Description: INTERVENTIONS:  - Identify barriers to discharge w/pt and caregiver  - Include patient/family/discharge partner in discharge planning  - Arrange for needed discharge resources and transportation as appropriate  - Identify discharge learning needs (meds, wound care, etc)  - Arrange for interpreters to assist at discharge as needed  - Consider post-discharge preferences of patient/family/discharge partner  - Complete POLST form as appropriate  - Assess patient's ability to be responsible for managing their own health  - Refer to Case Management Department for coordinating discharge planning if the patient needs post-hospital services based on physician/LIP order or complex needs related to functional status, cognitive ability or social support system  Outcome: Progressing     Problem: GASTROINTESTINAL - ADULT  Goal: Achieves appropriate nutritional intake (bariatric)  Description: INTERVENTIONS:  - Monitor for over-consumption  - Identify factors contributing to increased intake, treat as appropriate  - Monitor I&O, WT and lab values  - Obtain nutritional consult as needed  - Evaluate psychosocial factors contributing to over-consumption  Outcome: Progressing     Problem: SKIN/TISSUE INTEGRITY - ADULT  Goal: Skin integrity remains intact  Description: INTERVENTIONS  - Assess and document risk factors for pressure ulcer development  - Assess and document skin integrity  - Monitor for areas of redness and/or skin breakdown  - Initiate interventions, skin care algorithm/standards of care as needed  Outcome: Progressing

## 2022-09-11 NOTE — PLAN OF CARE
POD 1 to 2 s/p ulcer repair. Ambulates independently after set-up. NPO except sips with meds. Home meds reordered. Patient reports his ropinirole was discontinued for an alternative medication. PTA med list updated and ropinirole discontinued. Subcutaneous Lovenox for VTE prophylaxis. Remote tele in place. Voiding up to the bathroom. Gel ice PRN for shoulder pain. Receiving IVF and Zosyn. POLINA drain intact. Plan for upper GI Mon. Problem: Patient Centered Care  Goal: Patient preferences are identified and integrated in the patient's plan of care  Description: Interventions:  - What would you like us to know as we care for you? From home.   - Provide timely, complete, and accurate information to patient/family  - Incorporate patient and family knowledge, values, beliefs, and cultural backgrounds into the planning and delivery of care  - Encourage patient/family to participate in care and decision-making at the level they choose  - Honor patient and family perspectives and choices  Outcome: Progressing     Problem: PAIN - ADULT  Goal: Verbalizes/displays adequate comfort level or patient's stated pain goal  Description: INTERVENTIONS:  - Encourage pt to monitor pain and request assistance  - Assess pain using appropriate pain scale  - Administer analgesics based on type and severity of pain and evaluate response  - Implement non-pharmacological measures as appropriate and evaluate response  - Consider cultural and social influences on pain and pain management  - Manage/alleviate anxiety  - Utilize distraction and/or relaxation techniques  - Monitor for opioid side effects  - Notify MD/LIP if interventions unsuccessful or patient reports new pain  - Anticipate increased pain with activity and pre-medicate as appropriate  Outcome: Progressing     Problem: RISK FOR INFECTION - ADULT  Goal: Absence of fever/infection during anticipated neutropenic period  Description: INTERVENTIONS  - Monitor WBC  - Administer growth factors as ordered  - Implement neutropenic guidelines  Outcome: Progressing     Problem: SAFETY ADULT - FALL  Goal: Free from fall injury  Description: INTERVENTIONS:  - Assess pt frequently for physical needs  - Identify cognitive and physical deficits and behaviors that affect risk of falls.   - [place] fall precautions as indicated by assessment.  - Educate pt/family on patient safety including physical limitations  - Instruct pt to call for assistance with activity based on assessment  - Modify environment to reduce risk of injury  - Provide assistive devices as appropriate  - Consider OT/PT consult to assist with strengthening/mobility  - Encourage toileting schedule  Outcome: Progressing     Problem: DISCHARGE PLANNING  Goal: Discharge to home or other facility with appropriate resources  Description: INTERVENTIONS:  - Identify barriers to discharge w/pt and caregiver  - Include patient/family/discharge partner in discharge planning  - Arrange for needed discharge resources and transportation as appropriate  - Identify discharge learning needs (meds, wound care, etc)  - Arrange for interpreters to assist at discharge as needed  - Consider post-discharge preferences of patient/family/discharge partner  - Complete POLST form as appropriate  - Assess patient's ability to be responsible for managing their own health  - Refer to Case Management Department for coordinating discharge planning if the patient needs post-hospital services based on physician/LIP order or complex needs related to functional status, cognitive ability or social support system  Outcome: Progressing     Problem: GASTROINTESTINAL - ADULT  Goal: Achieves appropriate nutritional intake (bariatric)  Description: INTERVENTIONS:  - Monitor for over-consumption  - Identify factors contributing to increased intake, treat as appropriate  - Monitor I&O, WT and lab values  - Obtain nutritional consult as needed  - Evaluate psychosocial factors contributing to over-consumption  Outcome: Progressing     Problem: SKIN/TISSUE INTEGRITY - ADULT  Goal: Skin integrity remains intact  Description: INTERVENTIONS  - Assess and document risk factors for pressure ulcer development  - Assess and document skin integrity  - Monitor for areas of redness and/or skin breakdown  - Initiate interventions, skin care algorithm/standards of care as needed  Outcome: Progressing  Goal: Incision(s), wounds(s) or drain site(s) healing without S/S of infection  Description: INTERVENTIONS:  - Assess and document risk factors for pressure ulcer development  - Assess and document skin integrity  - Assess and document dressing/incision, wound bed, drain sites and surrounding tissue  - Implement wound care per orders  - Initiate isolation precautions as appropriate  - Initiate Pressure Ulcer prevention bundle as indicated  Outcome: Progressing     Problem: Patient/Family Goals  Goal: Patient/Family Long Term Goal  Description: Patient's Long Term Goal: My long term goal is to be able to discharge from the hospital to home or rehab. Interventions:  - PT, OT, pain meds, non-pharmacologic pain control, education on pain meds. - See additional Care Plan goals for specific interventions  Outcome: Progressing  Goal: Patient/Family Short Term Goal  Description: Patient's Short Term Goal: My goal is to have my pain be controlled to a 5 or less. Interventions:   - PT, OT, pain meds, non-pharmacologic pain control, education on pain meds.   - See additional Care Plan goals for specific interventions  Outcome: Progressing

## 2022-09-12 ENCOUNTER — APPOINTMENT (OUTPATIENT)
Dept: GENERAL RADIOLOGY | Facility: HOSPITAL | Age: 67
DRG: 331 | End: 2022-09-12
Attending: SURGERY

## 2022-09-12 LAB
ANION GAP SERPL CALC-SCNC: 7 MMOL/L (ref 0–18)
BUN BLD-MCNC: 9 MG/DL (ref 7–18)
BUN/CREAT SERPL: 7.7 (ref 10–20)
CALCIUM BLD-MCNC: 8.9 MG/DL (ref 8.5–10.1)
CHLORIDE SERPL-SCNC: 105 MMOL/L (ref 98–112)
CO2 SERPL-SCNC: 29 MMOL/L (ref 21–32)
CREAT BLD-MCNC: 1.17 MG/DL
DEPRECATED RDW RBC AUTO: 44 FL (ref 35.1–46.3)
ERYTHROCYTE [DISTWIDTH] IN BLOOD BY AUTOMATED COUNT: 12.9 % (ref 11–15)
GFR SERPLBLD BASED ON 1.73 SQ M-ARVRAT: 68 ML/MIN/1.73M2 (ref 60–?)
GLUCOSE BLD-MCNC: 91 MG/DL (ref 70–99)
HCT VFR BLD AUTO: 46.6 %
HGB BLD-MCNC: 15.4 G/DL
MCH RBC QN AUTO: 30.3 PG (ref 26–34)
MCHC RBC AUTO-ENTMCNC: 33 G/DL (ref 31–37)
MCV RBC AUTO: 91.7 FL
OSMOLALITY SERPL CALC.SUM OF ELEC: 290 MOSM/KG (ref 275–295)
PLATELET # BLD AUTO: 100 10(3)UL (ref 150–450)
POTASSIUM SERPL-SCNC: 3.7 MMOL/L (ref 3.5–5.1)
RBC # BLD AUTO: 5.08 X10(6)UL
SODIUM SERPL-SCNC: 141 MMOL/L (ref 136–145)
WBC # BLD AUTO: 5.8 X10(3) UL (ref 4–11)

## 2022-09-12 PROCEDURE — 74240 X-RAY XM UPR GI TRC 1CNTRST: CPT | Performed by: SURGERY

## 2022-09-12 PROCEDURE — 99232 SBSQ HOSP IP/OBS MODERATE 35: CPT | Performed by: HOSPITALIST

## 2022-09-12 RX ORDER — HYDROCHLOROTHIAZIDE 12.5 MG/1
12.5 TABLET ORAL DAILY
Status: DISCONTINUED | OUTPATIENT
Start: 2022-09-12 | End: 2022-09-13

## 2022-09-12 NOTE — PLAN OF CARE
POD 2 to 3 s/p ulcer repair. Ambulates independently after set-up. NPO except sips with meds. Wellbutrin retimed to be taken earlier in day per patient request, states taking the medication to late in the day causes him insomnia. Subcutaneous Lovenox for VTE prophylaxis. Remote tele in place. Voiding up to the bathroom. Gel ice PRN for shoulder pain. Receiving IVF and Zosyn. POLINA drain intact. Plan for upper GI with water soluble contrast Mon. Update 0453: Orthostat BP's completed. Problem: Patient Centered Care  Goal: Patient preferences are identified and integrated in the patient's plan of care  Description: Interventions:  - What would you like us to know as we care for you?  Patient had gastric bypass surgery 1/2019.  - Provide timely, complete, and accurate information to patient/family  - Incorporate patient and family knowledge, values, beliefs, and cultural backgrounds into the planning and delivery of care  - Encourage patient/family to participate in care and decision-making at the level they choose  - Honor patient and family perspectives and choices  Outcome: Progressing     Problem: PAIN - ADULT  Goal: Verbalizes/displays adequate comfort level or patient's stated pain goal  Description: INTERVENTIONS:  - Encourage pt to monitor pain and request assistance  - Assess pain using appropriate pain scale  - Administer analgesics based on type and severity of pain and evaluate response  - Implement non-pharmacological measures as appropriate and evaluate response  - Consider cultural and social influences on pain and pain management  - Manage/alleviate anxiety  - Utilize distraction and/or relaxation techniques  - Monitor for opioid side effects  - Notify MD/LIP if interventions unsuccessful or patient reports new pain  - Anticipate increased pain with activity and pre-medicate as appropriate  Outcome: Progressing     Problem: RISK FOR INFECTION - ADULT  Goal: Absence of fever/infection during anticipated neutropenic period  Description: INTERVENTIONS  - Monitor WBC  - Administer growth factors as ordered  - Implement neutropenic guidelines  Outcome: Progressing     Problem: SAFETY ADULT - FALL  Goal: Free from fall injury  Description: INTERVENTIONS:  - Assess pt frequently for physical needs  - Identify cognitive and physical deficits and behaviors that affect risk of falls.   - [place] fall precautions as indicated by assessment.  - Educate pt/family on patient safety including physical limitations  - Instruct pt to call for assistance with activity based on assessment  - Modify environment to reduce risk of injury  - Provide assistive devices as appropriate  - Consider OT/PT consult to assist with strengthening/mobility  - Encourage toileting schedule  Outcome: Progressing     Problem: DISCHARGE PLANNING  Goal: Discharge to home or other facility with appropriate resources  Description: INTERVENTIONS:  - Identify barriers to discharge w/pt and caregiver  - Include patient/family/discharge partner in discharge planning  - Arrange for needed discharge resources and transportation as appropriate  - Identify discharge learning needs (meds, wound care, etc)  - Arrange for interpreters to assist at discharge as needed  - Consider post-discharge preferences of patient/family/discharge partner  - Complete POLST form as appropriate  - Assess patient's ability to be responsible for managing their own health  - Refer to Case Management Department for coordinating discharge planning if the patient needs post-hospital services based on physician/LIP order or complex needs related to functional status, cognitive ability or social support system  Outcome: Progressing     Problem: GASTROINTESTINAL - ADULT  Goal: Achieves appropriate nutritional intake (bariatric)  Description: INTERVENTIONS:  - Monitor for over-consumption  - Identify factors contributing to increased intake, treat as appropriate  - Monitor I&O, WT and lab values  - Obtain nutritional consult as needed  - Evaluate psychosocial factors contributing to over-consumption  Outcome: Progressing     Problem: SKIN/TISSUE INTEGRITY - ADULT  Goal: Skin integrity remains intact  Description: INTERVENTIONS  - Assess and document risk factors for pressure ulcer development  - Assess and document skin integrity  - Monitor for areas of redness and/or skin breakdown  - Initiate interventions, skin care algorithm/standards of care as needed  Outcome: Progressing  Goal: Incision(s), wounds(s) or drain site(s) healing without S/S of infection  Description: INTERVENTIONS:  - Assess and document risk factors for pressure ulcer development  - Assess and document skin integrity  - Assess and document dressing/incision, wound bed, drain sites and surrounding tissue  - Implement wound care per orders  - Initiate isolation precautions as appropriate  - Initiate Pressure Ulcer prevention bundle as indicated  Outcome: Progressing     Problem: Patient/Family Goals  Goal: Patient/Family Long Term Goal  Description: Patient's Long Term Goal: My long term goal is to be able to discharge from the hospital to home or rehab. Interventions:  - PT, OT, pain meds, non-pharmacologic pain control, education on pain meds. - See additional Care Plan goals for specific interventions  Outcome: Progressing  Goal: Patient/Family Short Term Goal  Description: Patient's Short Term Goal: My goal is to have my pain be controlled to a 5 or less. Interventions:   - PT, OT, pain meds, non-pharmacologic pain control, education on pain meds.   - See additional Care Plan goals for specific interventions  Outcome: Progressing

## 2022-09-12 NOTE — PLAN OF CARE
POD 3 s/p ulcer repair. Ambulates independently. Went for UGI study this AM, findings negative. Had 1 stool after returning, and is receiving IVF and Zosyn; tolerating well and reports no nausea. Diet has been upgraded to bariatric full liquids. Pt was educated about bariatric diet precautions; no straws or carbonated beverages. POLINA drain removed, site covered with gauze and tegaderm and is dry and intact. Pain has been controlled with oxycodone PRN, last given at 0956. Has not requested pain meds since. Plan is to go home tomorrow. Problem: PAIN - ADULT  Goal: Verbalizes/displays adequate comfort level or patient's stated pain goal  Description: INTERVENTIONS:  - Encourage pt to monitor pain and request assistance  - Assess pain using appropriate pain scale  - Administer analgesics based on type and severity of pain and evaluate response  - Implement non-pharmacological measures as appropriate and evaluate response  - Consider cultural and social influences on pain and pain management  - Manage/alleviate anxiety  - Utilize distraction and/or relaxation techniques  - Monitor for opioid side effects  - Notify MD/LIP if interventions unsuccessful or patient reports new pain  - Anticipate increased pain with activity and pre-medicate as appropriate  Outcome: Progressing     Problem: RISK FOR INFECTION - ADULT  Goal: Absence of fever/infection during anticipated neutropenic period  Description: INTERVENTIONS  - Monitor WBC  - Administer growth factors as ordered  - Implement neutropenic guidelines  Outcome: Progressing     Problem: SAFETY ADULT - FALL  Goal: Free from fall injury  Description: INTERVENTIONS:  - Assess pt frequently for physical needs  - Identify cognitive and physical deficits and behaviors that affect risk of falls.   - [place] fall precautions as indicated by assessment.  - Educate pt/family on patient safety including physical limitations  - Instruct pt to call for assistance with activity based on assessment  - Modify environment to reduce risk of injury  - Provide assistive devices as appropriate  - Consider OT/PT consult to assist with strengthening/mobility  - Encourage toileting schedule  Outcome: Progressing     Problem: DISCHARGE PLANNING  Goal: Discharge to home or other facility with appropriate resources  Description: INTERVENTIONS:  - Identify barriers to discharge w/pt and caregiver  - Include patient/family/discharge partner in discharge planning  - Arrange for needed discharge resources and transportation as appropriate  - Identify discharge learning needs (meds, wound care, etc)  - Arrange for interpreters to assist at discharge as needed  - Consider post-discharge preferences of patient/family/discharge partner  - Complete POLST form as appropriate  - Assess patient's ability to be responsible for managing their own health  - Refer to Case Management Department for coordinating discharge planning if the patient needs post-hospital services based on physician/LIP order or complex needs related to functional status, cognitive ability or social support system  Outcome: Progressing

## 2022-09-13 VITALS
HEIGHT: 68 IN | SYSTOLIC BLOOD PRESSURE: 140 MMHG | BODY MASS INDEX: 33.34 KG/M2 | DIASTOLIC BLOOD PRESSURE: 82 MMHG | OXYGEN SATURATION: 96 % | HEART RATE: 73 BPM | WEIGHT: 220 LBS | TEMPERATURE: 98 F | RESPIRATION RATE: 16 BRPM

## 2022-09-13 PROCEDURE — 99239 HOSP IP/OBS DSCHRG MGMT >30: CPT | Performed by: HOSPITALIST

## 2022-09-13 RX ORDER — ACETAMINOPHEN 160 MG/5ML
1000 SOLUTION ORAL EVERY 8 HOURS
Qty: 473 ML | Refills: 2 | Status: SHIPPED | OUTPATIENT
Start: 2022-09-13

## 2022-09-13 RX ORDER — OXYCODONE HYDROCHLORIDE 5 MG/1
5 TABLET ORAL EVERY 6 HOURS PRN
Qty: 10 TABLET | Refills: 0 | Status: SHIPPED | OUTPATIENT
Start: 2022-09-13

## 2022-09-13 RX ORDER — PANTOPRAZOLE SODIUM 40 MG/1
40 TABLET, DELAYED RELEASE ORAL DAILY
Qty: 90 TABLET | Refills: 0 | Status: SHIPPED | OUTPATIENT
Start: 2022-09-13 | End: 2022-09-21

## 2022-09-13 RX ORDER — HYDROCHLOROTHIAZIDE 12.5 MG/1
12.5 TABLET ORAL DAILY
Qty: 30 TABLET | Refills: 0 | Status: SHIPPED | OUTPATIENT
Start: 2022-09-14

## 2022-09-13 NOTE — PLAN OF CARE
Patient alert and oriented. VSS. Receiving IV fluids and abx per MD order. Room air. Pt denying any pain at the moment. Frequent repositioning. Fall precautions maintained. Frequent rounding by nursing staff. Patient cleared by internal medicine and bariatrics. Going home with no needs. IV removed, discharge education provided, patient sent home with all personal belongings, scripts, and discharge instructions. Addressed additional questions. Problem: Patient Centered Care  Goal: Patient preferences are identified and integrated in the patient's plan of care  Description: Interventions:  - What would you like us to know as we care for you?  I've been better  - Provide timely, complete, and accurate information to patient/family  - Incorporate patient and family knowledge, values, beliefs, and cultural backgrounds into the planning and delivery of care  - Encourage patient/family to participate in care and decision-making at the level they choose  - Honor patient and family perspectives and choices  Outcome: Adequate for Discharge     Problem: PAIN - ADULT  Goal: Verbalizes/displays adequate comfort level or patient's stated pain goal  Description: INTERVENTIONS:  - Encourage pt to monitor pain and request assistance  - Assess pain using appropriate pain scale  - Administer analgesics based on type and severity of pain and evaluate response  - Implement non-pharmacological measures as appropriate and evaluate response  - Consider cultural and social influences on pain and pain management  - Manage/alleviate anxiety  - Utilize distraction and/or relaxation techniques  - Monitor for opioid side effects  - Notify MD/LIP if interventions unsuccessful or patient reports new pain  - Anticipate increased pain with activity and pre-medicate as appropriate  Outcome: Adequate for Discharge     Problem: RISK FOR INFECTION - ADULT  Goal: Absence of fever/infection during anticipated neutropenic period  Description: INTERVENTIONS  - Monitor WBC  - Administer growth factors as ordered  - Implement neutropenic guidelines  Outcome: Adequate for Discharge     Problem: SAFETY ADULT - FALL  Goal: Free from fall injury  Description: INTERVENTIONS:  - Assess pt frequently for physical needs  - Identify cognitive and physical deficits and behaviors that affect risk of falls.   - [place] fall precautions as indicated by assessment.  - Educate pt/family on patient safety including physical limitations  - Instruct pt to call for assistance with activity based on assessment  - Modify environment to reduce risk of injury  - Provide assistive devices as appropriate  - Consider OT/PT consult to assist with strengthening/mobility  - Encourage toileting schedule  Outcome: Adequate for Discharge     Problem: DISCHARGE PLANNING  Goal: Discharge to home or other facility with appropriate resources  Description: INTERVENTIONS:  - Identify barriers to discharge w/pt and caregiver  - Include patient/family/discharge partner in discharge planning  - Arrange for needed discharge resources and transportation as appropriate  - Identify discharge learning needs (meds, wound care, etc)  - Arrange for interpreters to assist at discharge as needed  - Consider post-discharge preferences of patient/family/discharge partner  - Complete POLST form as appropriate  - Assess patient's ability to be responsible for managing their own health  - Refer to Case Management Department for coordinating discharge planning if the patient needs post-hospital services based on physician/LIP order or complex needs related to functional status, cognitive ability or social support system  Outcome: Adequate for Discharge     Problem: GASTROINTESTINAL - ADULT  Goal: Achieves appropriate nutritional intake (bariatric)  Description: INTERVENTIONS:  - Monitor for over-consumption  - Identify factors contributing to increased intake, treat as appropriate  - Monitor I&O, WT and lab values  - Obtain nutritional consult as needed  - Evaluate psychosocial factors contributing to over-consumption  Outcome: Adequate for Discharge     Problem: SKIN/TISSUE INTEGRITY - ADULT  Goal: Skin integrity remains intact  Description: INTERVENTIONS  - Assess and document risk factors for pressure ulcer development  - Assess and document skin integrity  - Monitor for areas of redness and/or skin breakdown  - Initiate interventions, skin care algorithm/standards of care as needed  Outcome: Adequate for Discharge  Goal: Incision(s), wounds(s) or drain site(s) healing without S/S of infection  Description: INTERVENTIONS:  - Assess and document risk factors for pressure ulcer development  - Assess and document skin integrity  - Assess and document dressing/incision, wound bed, drain sites and surrounding tissue  - Implement wound care per orders  - Initiate isolation precautions as appropriate  - Initiate Pressure Ulcer prevention bundle as indicated  Outcome: Adequate for Discharge     Problem: Patient/Family Goals  Goal: Patient/Family Long Term Goal  Description: Patient's Long Term Goal: pain medication     Interventions:  - follow md orders  -take pain medication as needed  -non-pharmacologic therapy  - See additional Care Plan goals for specific interventions  Outcome: Adequate for Discharge  Goal: Patient/Family Short Term Goal  Description: Patient's Short Term Goal: advance diet     Interventions:   - follow md orders  -advance diet   -if not tolerating go back to previous diet  -ambulate frequently  - See additional Care Plan goals for specific interventions  Outcome: Adequate for Discharge

## 2022-09-13 NOTE — PLAN OF CARE
POD 4. A&O x4. [place]. Remote tele. Full liquid bariatric diet, tolerating well. IVF and IV zosyn running per orders. Voiding freely. Up with standby assist. Plan is to go home in the morning. Call light within reach, safety measures in place. Problem: Patient Centered Care  Goal: Patient preferences are identified and integrated in the patient's plan of care  Description: Interventions:  - What would you like us to know as we care for you?  I've been better  - Provide timely, complete, and accurate information to patient/family  - Incorporate patient and family knowledge, values, beliefs, and cultural backgrounds into the planning and delivery of care  - Encourage patient/family to participate in care and decision-making at the level they choose  - Honor patient and family perspectives and choices  Outcome: Progressing     Problem: PAIN - ADULT  Goal: Verbalizes/displays adequate comfort level or patient's stated pain goal  Description: INTERVENTIONS:  - Encourage pt to monitor pain and request assistance  - Assess pain using appropriate pain scale  - Administer analgesics based on type and severity of pain and evaluate response  - Implement non-pharmacological measures as appropriate and evaluate response  - Consider cultural and social influences on pain and pain management  - Manage/alleviate anxiety  - Utilize distraction and/or relaxation techniques  - Monitor for opioid side effects  - Notify MD/LIP if interventions unsuccessful or patient reports new pain  - Anticipate increased pain with activity and pre-medicate as appropriate  Outcome: Progressing     Problem: RISK FOR INFECTION - ADULT  Goal: Absence of fever/infection during anticipated neutropenic period  Description: INTERVENTIONS  - Monitor WBC  - Administer growth factors as ordered  - Implement neutropenic guidelines  Outcome: Progressing     Problem: SAFETY ADULT - FALL  Goal: Free from fall injury  Description: INTERVENTIONS:  - Assess pt frequently for physical needs  - Identify cognitive and physical deficits and behaviors that affect risk of falls.   - [place] fall precautions as indicated by assessment.  - Educate pt/family on patient safety including physical limitations  - Instruct pt to call for assistance with activity based on assessment  - Modify environment to reduce risk of injury  - Provide assistive devices as appropriate  - Consider OT/PT consult to assist with strengthening/mobility  - Encourage toileting schedule  Outcome: Progressing     Problem: DISCHARGE PLANNING  Goal: Discharge to home or other facility with appropriate resources  Description: INTERVENTIONS:  - Identify barriers to discharge w/pt and caregiver  - Include patient/family/discharge partner in discharge planning  - Arrange for needed discharge resources and transportation as appropriate  - Identify discharge learning needs (meds, wound care, etc)  - Arrange for interpreters to assist at discharge as needed  - Consider post-discharge preferences of patient/family/discharge partner  - Complete POLST form as appropriate  - Assess patient's ability to be responsible for managing their own health  - Refer to Case Management Department for coordinating discharge planning if the patient needs post-hospital services based on physician/LIP order or complex needs related to functional status, cognitive ability or social support system  Outcome: Progressing     Problem: GASTROINTESTINAL - ADULT  Goal: Achieves appropriate nutritional intake (bariatric)  Description: INTERVENTIONS:  - Monitor for over-consumption  - Identify factors contributing to increased intake, treat as appropriate  - Monitor I&O, WT and lab values  - Obtain nutritional consult as needed  - Evaluate psychosocial factors contributing to over-consumption  Outcome: Progressing     Problem: SKIN/TISSUE INTEGRITY - ADULT  Goal: Skin integrity remains intact  Description: INTERVENTIONS  - Assess and document risk factors for pressure ulcer development  - Assess and document skin integrity  - Monitor for areas of redness and/or skin breakdown  - Initiate interventions, skin care algorithm/standards of care as needed  Outcome: Progressing  Goal: Incision(s), wounds(s) or drain site(s) healing without S/S of infection  Description: INTERVENTIONS:  - Assess and document risk factors for pressure ulcer development  - Assess and document skin integrity  - Assess and document dressing/incision, wound bed, drain sites and surrounding tissue  - Implement wound care per orders  - Initiate isolation precautions as appropriate  - Initiate Pressure Ulcer prevention bundle as indicated  Outcome: Progressing     Problem: Patient/Family Goals  Goal: Patient/Family Long Term Goal  Description: Patient's Long Term Goal: go home    Interventions:  - diet, pain management, monitoring   - See additional Care Plan goals for specific interventions  Outcome: Progressing  Goal: Patient/Family Short Term Goal  Description: Patient's Short Term Goal: control pain    Interventions:   - rest, ice, medication   - See additional Care Plan goals for specific interventions  Outcome: Progressing

## 2022-09-14 ENCOUNTER — TELEPHONE (OUTPATIENT)
Dept: FAMILY MEDICINE CLINIC | Facility: CLINIC | Age: 67
End: 2022-09-14

## 2022-09-14 ENCOUNTER — PATIENT OUTREACH (OUTPATIENT)
Dept: CASE MANAGEMENT | Age: 67
End: 2022-09-14

## 2022-09-14 DIAGNOSIS — Z02.9 ENCOUNTERS FOR UNSPECIFIED ADMINISTRATIVE PURPOSE: ICD-10-CM

## 2022-09-14 DIAGNOSIS — K63.1 BOWEL PERFORATION (HCC): ICD-10-CM

## 2022-09-14 PROCEDURE — 1111F DSCHRG MED/CURRENT MED MERGE: CPT

## 2022-09-14 RX ORDER — FLUTICASONE PROPIONATE 50 MCG
2 SPRAY, SUSPENSION (ML) NASAL 2 TIMES DAILY
COMMUNITY
Start: 2022-09-04

## 2022-09-14 RX ORDER — SILDENAFIL CITRATE 20 MG/1
20 TABLET ORAL DAILY PRN
COMMUNITY
Start: 2022-08-15

## 2022-09-14 NOTE — TELEPHONE ENCOUNTER
Spoke to pt for TCM today. Pt does not have HFU appt scheduled at this time. TCM/HFU appt recommended by 9/20/2022, as pt is a high risk for readmission. Offered TCM/HFU appt next Thursday with Dr. Radha Aguirre unable to book, as he can another appt at that time--patient requesting appt next Wednesday, any time with PCP. Please advise. BOOK BY DATE (last date for TCM): 9/27/2022    Clinical staff:  Please f/u with PCP (can res24 be used Wednesday, 9/21/2022 for TCM/HFU?) and pt and try to get them to schedule as pt would greatly benefit from TCM/HFU appt. Thank you!        Future Appointments   Date Time Provider Sherene Harman   9/28/2022  1:15 PM [address] [place] [address] MRI RM2 (3T WIDE) [address] [place] [address] MRI EM 7 [address] [place] [address]   10/6/2022  8:30 PM [address] [place] [address] SLEEP ROOMS [address] [place] [address] SLEEP EM [facility]   10/11/2022 11:40 AM Meka Pineda DO [id number] [place] [id number]

## 2022-09-19 NOTE — TELEPHONE ENCOUNTER
Future Appointments   Date Time Provider Shereen Harman   9/21/2022 10:00 AM DO PETERSON Calix   9/28/2022  1:15 PM [address] [place] [address] MRI RM2 (3T WIDE) [address] [place] [address] MRI  [address] [place] [address]   10/6/2022  8:30 PM [address] [place] [address] SLEEP ROOMS [address] [place] [address] SLEEP EM Sleep C[facility]   10/11/2022 11:40 AM Alycia Harris DO [id number] [place] [id number]

## 2022-09-21 ENCOUNTER — OFFICE VISIT (OUTPATIENT)
Dept: FAMILY MEDICINE CLINIC | Facility: CLINIC | Age: 67
End: 2022-09-21
Payer: MEDICARE

## 2022-09-21 ENCOUNTER — OFFICE VISIT (OUTPATIENT)
Dept: SURGERY | Facility: CLINIC | Age: 67
End: 2022-09-21

## 2022-09-21 VITALS
SYSTOLIC BLOOD PRESSURE: 120 MMHG | OXYGEN SATURATION: 98 % | WEIGHT: 207 LBS | BODY MASS INDEX: 31.37 KG/M2 | DIASTOLIC BLOOD PRESSURE: 74 MMHG | HEART RATE: 85 BPM | TEMPERATURE: 97 F | HEIGHT: 68 IN

## 2022-09-21 VITALS
HEART RATE: 78 BPM | DIASTOLIC BLOOD PRESSURE: 80 MMHG | OXYGEN SATURATION: 99 % | SYSTOLIC BLOOD PRESSURE: 126 MMHG | HEIGHT: 68 IN | BODY MASS INDEX: 31.07 KG/M2 | WEIGHT: 205 LBS

## 2022-09-21 DIAGNOSIS — Z98.84 HISTORY OF ROUX-EN-Y GASTRIC BYPASS: ICD-10-CM

## 2022-09-21 DIAGNOSIS — Z98.84 H/O GASTRIC BYPASS: Primary | ICD-10-CM

## 2022-09-21 DIAGNOSIS — K63.1 BOWEL PERFORATION (HCC): Primary | ICD-10-CM

## 2022-09-21 DIAGNOSIS — K28.9 MARGINAL ULCER: ICD-10-CM

## 2022-09-21 PROCEDURE — 1111F DSCHRG MED/CURRENT MED MERGE: CPT | Performed by: FAMILY MEDICINE

## 2022-09-21 PROCEDURE — 99495 TRANSJ CARE MGMT MOD F2F 14D: CPT | Performed by: FAMILY MEDICINE

## 2022-09-21 RX ORDER — OMEPRAZOLE 40 MG/1
40 CAPSULE, DELAYED RELEASE ORAL DAILY
Qty: 90 CAPSULE | Refills: 3 | Status: SHIPPED | OUTPATIENT
Start: 2022-09-21 | End: 2023-09-16

## 2022-09-26 ENCOUNTER — TELEPHONE (OUTPATIENT)
Dept: SURGERY | Facility: CLINIC | Age: 67
End: 2022-09-26

## 2022-09-28 ENCOUNTER — HOSPITAL ENCOUNTER (OUTPATIENT)
Dept: MRI IMAGING | Facility: HOSPITAL | Age: 67
Discharge: HOME | End: 2022-09-28
Attending: Other
Payer: MEDICARE

## 2022-09-28 DIAGNOSIS — H93.25: ICD-10-CM

## 2022-09-28 PROCEDURE — 70551 MRI BRAIN STEM W/O DYE: CPT | Performed by: OTHER

## 2022-10-06 ENCOUNTER — OFFICE VISIT (OUTPATIENT)
Dept: SLEEP CENTER | Age: 67
End: 2022-10-06
Attending: Other
Payer: MEDICARE

## 2022-10-06 DIAGNOSIS — G47.33 OSA (OBSTRUCTIVE SLEEP APNEA): ICD-10-CM

## 2022-10-06 DIAGNOSIS — Z76.89 SLEEP CONCERN: Primary | ICD-10-CM

## 2022-10-06 PROCEDURE — 95810 POLYSOM 6/> YRS 4/> PARAM: CPT

## 2022-10-19 ENCOUNTER — OFFICE VISIT (OUTPATIENT)
Dept: SURGERY | Facility: CLINIC | Age: 67
End: 2022-10-19
Payer: MEDICARE

## 2022-10-19 VITALS
WEIGHT: 215 LBS | DIASTOLIC BLOOD PRESSURE: 80 MMHG | HEIGHT: 68 IN | SYSTOLIC BLOOD PRESSURE: 118 MMHG | OXYGEN SATURATION: 97 % | HEART RATE: 61 BPM | BODY MASS INDEX: 32.58 KG/M2

## 2022-11-01 ENCOUNTER — TELEPHONE (OUTPATIENT)
Dept: NEUROLOGY | Facility: CLINIC | Age: 67
End: 2022-11-01

## 2022-11-01 NOTE — TELEPHONE ENCOUNTER
Spoke to patient to relay below information.      Call   Him     Tell him he has leah   Tell   Him Cecil will order a cpap   Titration

## 2022-11-09 ENCOUNTER — LAB ENCOUNTER (OUTPATIENT)
Dept: LAB | Age: 67
End: 2022-11-09
Attending: FAMILY MEDICINE
Payer: MEDICARE

## 2022-11-09 ENCOUNTER — OFFICE VISIT (OUTPATIENT)
Dept: FAMILY MEDICINE CLINIC | Facility: CLINIC | Age: 67
End: 2022-11-09
Payer: MEDICARE

## 2022-11-09 VITALS
TEMPERATURE: 98 F | HEIGHT: 67 IN | WEIGHT: 211 LBS | DIASTOLIC BLOOD PRESSURE: 84 MMHG | HEART RATE: 50 BPM | BODY MASS INDEX: 33.12 KG/M2 | SYSTOLIC BLOOD PRESSURE: 112 MMHG

## 2022-11-09 DIAGNOSIS — Z86.718 HISTORY OF DVT (DEEP VEIN THROMBOSIS): ICD-10-CM

## 2022-11-09 DIAGNOSIS — D69.6 THROMBOCYTOPENIA (HCC): ICD-10-CM

## 2022-11-09 DIAGNOSIS — Z98.84 H/O GASTRIC BYPASS: ICD-10-CM

## 2022-11-09 DIAGNOSIS — E66.9 OBESITY (BMI 30-39.9): ICD-10-CM

## 2022-11-09 DIAGNOSIS — F41.9 ANXIETY: ICD-10-CM

## 2022-11-09 DIAGNOSIS — R35.0 URINARY FREQUENCY: ICD-10-CM

## 2022-11-09 DIAGNOSIS — Z00.00 ENCOUNTER FOR ANNUAL WELLNESS EXAM IN MEDICARE PATIENT: ICD-10-CM

## 2022-11-09 DIAGNOSIS — Z00.00 ENCOUNTER FOR ANNUAL WELLNESS EXAM IN MEDICARE PATIENT: Primary | ICD-10-CM

## 2022-11-09 DIAGNOSIS — R79.89 ELEVATED TSH: ICD-10-CM

## 2022-11-09 PROBLEM — E11.9 DIABETES MELLITUS TYPE 2, DIET-CONTROLLED (HCC): Status: RESOLVED | Noted: 2020-01-20 | Resolved: 2022-11-09

## 2022-11-09 LAB
ALBUMIN SERPL-MCNC: 3.6 G/DL (ref 3.4–5)
ALBUMIN/GLOB SERPL: 1 {RATIO} (ref 1–2)
ALP LIVER SERPL-CCNC: 72 U/L
ALT SERPL-CCNC: 29 U/L
ANION GAP SERPL CALC-SCNC: 8 MMOL/L (ref 0–18)
AST SERPL-CCNC: 21 U/L (ref 15–37)
BILIRUB SERPL-MCNC: 0.7 MG/DL (ref 0.1–2)
BUN BLD-MCNC: 12 MG/DL (ref 7–18)
BUN/CREAT SERPL: 11.8 (ref 10–20)
CALCIUM BLD-MCNC: 8.7 MG/DL (ref 8.5–10.1)
CHLORIDE SERPL-SCNC: 106 MMOL/L (ref 98–112)
CHOLEST SERPL-MCNC: 128 MG/DL (ref ?–200)
CO2 SERPL-SCNC: 27 MMOL/L (ref 21–32)
CREAT BLD-MCNC: 1.02 MG/DL
DEPRECATED RDW RBC AUTO: 46.5 FL (ref 35.1–46.3)
ERYTHROCYTE [DISTWIDTH] IN BLOOD BY AUTOMATED COUNT: 13.8 % (ref 11–15)
FASTING PATIENT LIPID ANSWER: YES
FASTING STATUS PATIENT QL REPORTED: YES
GFR SERPLBLD BASED ON 1.73 SQ M-ARVRAT: 81 ML/MIN/1.73M2 (ref 60–?)
GLOBULIN PLAS-MCNC: 3.7 G/DL (ref 2.8–4.4)
GLUCOSE BLD-MCNC: 70 MG/DL (ref 70–99)
HCT VFR BLD AUTO: 49.8 %
HDLC SERPL-MCNC: 41 MG/DL (ref 40–59)
HGB BLD-MCNC: 16.1 G/DL
LDLC SERPL CALC-MCNC: 66 MG/DL (ref ?–100)
MCH RBC QN AUTO: 29.5 PG (ref 26–34)
MCHC RBC AUTO-ENTMCNC: 32.3 G/DL (ref 31–37)
MCV RBC AUTO: 91.2 FL
NONHDLC SERPL-MCNC: 87 MG/DL (ref ?–130)
OSMOLALITY SERPL CALC.SUM OF ELEC: 290 MOSM/KG (ref 275–295)
PLATELET # BLD AUTO: 169 10(3)UL (ref 150–450)
POTASSIUM SERPL-SCNC: 4 MMOL/L (ref 3.5–5.1)
PROT SERPL-MCNC: 7.3 G/DL (ref 6.4–8.2)
RBC # BLD AUTO: 5.46 X10(6)UL
SODIUM SERPL-SCNC: 141 MMOL/L (ref 136–145)
TRIGL SERPL-MCNC: 114 MG/DL (ref 30–149)
TSI SER-ACNC: 2.78 MIU/ML (ref 0.36–3.74)
VLDLC SERPL CALC-MCNC: 17 MG/DL (ref 0–30)
WBC # BLD AUTO: 6.9 X10(3) UL (ref 4–11)

## 2022-11-09 PROCEDURE — 85027 COMPLETE CBC AUTOMATED: CPT

## 2022-11-09 PROCEDURE — 36415 COLL VENOUS BLD VENIPUNCTURE: CPT | Performed by: FAMILY MEDICINE

## 2022-11-09 PROCEDURE — 84443 ASSAY THYROID STIM HORMONE: CPT

## 2022-11-09 PROCEDURE — 90732 PPSV23 VACC 2 YRS+ SUBQ/IM: CPT | Performed by: FAMILY MEDICINE

## 2022-11-09 PROCEDURE — 80053 COMPREHEN METABOLIC PANEL: CPT | Performed by: FAMILY MEDICINE

## 2022-11-09 PROCEDURE — G0402 INITIAL PREVENTIVE EXAM: HCPCS | Performed by: FAMILY MEDICINE

## 2022-11-09 PROCEDURE — 80061 LIPID PANEL: CPT | Performed by: FAMILY MEDICINE

## 2022-11-09 PROCEDURE — G0009 ADMIN PNEUMOCOCCAL VACCINE: HCPCS | Performed by: FAMILY MEDICINE

## 2022-11-09 RX ORDER — FLUTICASONE PROPIONATE 50 MCG
2 SPRAY, SUSPENSION (ML) NASAL 2 TIMES DAILY
Qty: 1 EACH | Refills: 3 | Status: SHIPPED | OUTPATIENT
Start: 2022-11-09

## 2022-11-11 ENCOUNTER — OFFICE VISIT (OUTPATIENT)
Dept: SURGERY | Facility: CLINIC | Age: 67
End: 2022-11-11
Payer: MEDICARE

## 2022-11-11 VITALS
DIASTOLIC BLOOD PRESSURE: 73 MMHG | OXYGEN SATURATION: 96 % | HEIGHT: 67 IN | HEART RATE: 63 BPM | SYSTOLIC BLOOD PRESSURE: 141 MMHG | BODY MASS INDEX: 33.41 KG/M2 | WEIGHT: 212.88 LBS

## 2022-11-11 DIAGNOSIS — E44.1 MILD PROTEIN-CALORIE MALNUTRITION (HCC): Primary | ICD-10-CM

## 2022-11-11 DIAGNOSIS — E66.9 OBESITY (BMI 30-39.9): ICD-10-CM

## 2022-11-11 DIAGNOSIS — Z98.84 H/O GASTRIC BYPASS: ICD-10-CM

## 2022-11-11 DIAGNOSIS — E55.9 VITAMIN D DEFICIENCY: ICD-10-CM

## 2022-11-11 PROCEDURE — 99214 OFFICE O/P EST MOD 30 MIN: CPT | Performed by: INTERNAL MEDICINE

## 2022-11-11 RX ORDER — TOPIRAMATE 25 MG/1
25 TABLET ORAL EVERY EVENING
Qty: 30 TABLET | Refills: 2 | Status: SHIPPED | OUTPATIENT
Start: 2022-11-11

## 2022-11-14 ENCOUNTER — LAB ENCOUNTER (OUTPATIENT)
Dept: LAB | Facility: HOSPITAL | Age: 67
End: 2022-11-14
Attending: INTERNAL MEDICINE
Payer: MEDICARE

## 2022-11-14 DIAGNOSIS — E44.1 MILD PROTEIN-CALORIE MALNUTRITION (HCC): ICD-10-CM

## 2022-11-14 DIAGNOSIS — E55.9 VITAMIN D DEFICIENCY: ICD-10-CM

## 2022-11-14 DIAGNOSIS — E66.9 OBESITY (BMI 30-39.9): ICD-10-CM

## 2022-11-14 DIAGNOSIS — Z98.84 H/O GASTRIC BYPASS: ICD-10-CM

## 2022-11-14 LAB
VIT B12 SERPL-MCNC: 801 PG/ML (ref 193–986)
VIT D+METAB SERPL-MCNC: 34.7 NG/ML (ref 30–100)

## 2022-11-14 PROCEDURE — 82306 VITAMIN D 25 HYDROXY: CPT

## 2022-11-14 PROCEDURE — 84425 ASSAY OF VITAMIN B-1: CPT

## 2022-11-14 PROCEDURE — 82607 VITAMIN B-12: CPT

## 2022-11-14 PROCEDURE — 36415 COLL VENOUS BLD VENIPUNCTURE: CPT

## 2022-11-18 LAB — VITAMIN B1 (THIAMINE), WHOLE B: 137 NMOL/L

## 2022-12-06 ENCOUNTER — OFFICE VISIT (OUTPATIENT)
Dept: NEUROLOGY | Facility: CLINIC | Age: 67
End: 2022-12-06
Payer: MEDICARE

## 2022-12-06 VITALS
SYSTOLIC BLOOD PRESSURE: 141 MMHG | HEIGHT: 67 IN | WEIGHT: 212 LBS | DIASTOLIC BLOOD PRESSURE: 73 MMHG | BODY MASS INDEX: 33.27 KG/M2 | HEART RATE: 63 BPM

## 2022-12-06 DIAGNOSIS — G25.81 RESTLESS LEG: ICD-10-CM

## 2022-12-06 DIAGNOSIS — G25.0 ESSENTIAL TREMOR: ICD-10-CM

## 2022-12-06 DIAGNOSIS — G20 PD (PARKINSON'S DISEASE) (HCC): Primary | ICD-10-CM

## 2022-12-06 PROCEDURE — 99214 OFFICE O/P EST MOD 30 MIN: CPT | Performed by: OTHER

## 2022-12-06 RX ORDER — PROPRANOLOL HYDROCHLORIDE 20 MG/1
10 TABLET ORAL 3 TIMES DAILY PRN
Qty: 180 TABLET | Refills: 0 | Status: SHIPPED | OUTPATIENT
Start: 2022-12-06 | End: 2023-04-05

## 2022-12-06 RX ORDER — GABAPENTIN 300 MG/1
CAPSULE ORAL
Qty: 108 CAPSULE | Refills: 1 | Status: SHIPPED | OUTPATIENT
Start: 2022-12-06

## 2022-12-25 PROBLEM — Z98.84 GASTRIC BYPASS STATUS FOR OBESITY: Status: RESOLVED | Noted: 2019-02-07 | Resolved: 2022-12-25

## 2022-12-25 PROBLEM — K28.9 MARGINAL ULCER: Status: RESOLVED | Noted: 2022-09-21 | Resolved: 2022-12-25

## 2022-12-25 PROBLEM — E66.3 OVERWEIGHT (BMI 25.0-29.9): Status: RESOLVED | Noted: 2020-01-20 | Resolved: 2022-12-25

## 2022-12-25 PROBLEM — G25.3 MYOCLONIC JERKING: Status: RESOLVED | Noted: 2022-06-06 | Resolved: 2022-12-25

## 2022-12-25 PROBLEM — E44.1 MILD PROTEIN-CALORIE MALNUTRITION (HCC): Status: RESOLVED | Noted: 2022-01-10 | Resolved: 2022-12-25

## 2022-12-25 PROBLEM — K63.1 BOWEL PERFORATION (HCC): Status: RESOLVED | Noted: 2022-09-09 | Resolved: 2022-12-25

## 2022-12-25 PROBLEM — Z98.84 HISTORY OF ROUX-EN-Y GASTRIC BYPASS: Status: RESOLVED | Noted: 2022-09-09 | Resolved: 2022-12-25

## 2022-12-25 PROBLEM — Z01.818 PREOP TESTING: Status: RESOLVED | Noted: 2019-01-28 | Resolved: 2022-12-25

## 2023-01-09 ENCOUNTER — OFFICE VISIT (OUTPATIENT)
Dept: NEUROLOGY | Facility: CLINIC | Age: 68
End: 2023-01-09
Payer: MEDICARE

## 2023-01-09 ENCOUNTER — OFFICE VISIT (OUTPATIENT)
Dept: PHYSICAL THERAPY | Facility: HOSPITAL | Age: 68
End: 2023-01-09
Attending: Other
Payer: MEDICARE

## 2023-01-09 VITALS
SYSTOLIC BLOOD PRESSURE: 140 MMHG | DIASTOLIC BLOOD PRESSURE: 72 MMHG | BODY MASS INDEX: 33.27 KG/M2 | HEART RATE: 62 BPM | WEIGHT: 212 LBS | HEIGHT: 67 IN

## 2023-01-09 DIAGNOSIS — G20 PD (PARKINSON'S DISEASE) (HCC): Primary | ICD-10-CM

## 2023-01-09 DIAGNOSIS — G20 PD (PARKINSON'S DISEASE) (HCC): ICD-10-CM

## 2023-01-09 PROBLEM — G20.A1 PARKINSON'S DISEASE: Status: ACTIVE | Noted: 2023-01-09

## 2023-01-09 PROBLEM — G20.A1 PARKINSON'S DISEASE (HCC): Status: ACTIVE | Noted: 2023-01-09

## 2023-01-09 PROCEDURE — 97110 THERAPEUTIC EXERCISES: CPT

## 2023-01-09 PROCEDURE — 97161 PT EVAL LOW COMPLEX 20 MIN: CPT

## 2023-01-09 PROCEDURE — 99214 OFFICE O/P EST MOD 30 MIN: CPT | Performed by: OTHER

## 2023-01-09 RX ORDER — TOPIRAMATE 25 MG/1
TABLET ORAL
Qty: 30 TABLET | Refills: 2 | Status: SHIPPED | OUTPATIENT
Start: 2023-01-09

## 2023-01-09 NOTE — PATIENT INSTRUCTIONS
It is more important to take the medication every 5 hours than take it at the same time. If you need to take it on a full stomach you can but you may not get as much benefit. Do not take it immediately before or immediately after eating a meal with a lot of protein. Take it  1 hour after eating a meal with a large amount of protein. If you had to, I would recommend taking the dose a little earlier rather that delaying it significantly and having worse symptoms.

## 2023-01-10 ENCOUNTER — APPOINTMENT (OUTPATIENT)
Dept: PHYSICAL THERAPY | Facility: HOSPITAL | Age: 68
End: 2023-01-10
Attending: Other
Payer: MEDICARE

## 2023-01-11 ENCOUNTER — APPOINTMENT (OUTPATIENT)
Dept: PHYSICAL THERAPY | Facility: HOSPITAL | Age: 68
End: 2023-01-11
Attending: Other
Payer: MEDICARE

## 2023-01-12 ENCOUNTER — OFFICE VISIT (OUTPATIENT)
Dept: PHYSICAL THERAPY | Facility: HOSPITAL | Age: 68
End: 2023-01-12
Attending: Other
Payer: MEDICARE

## 2023-01-12 PROCEDURE — 97112 NEUROMUSCULAR REEDUCATION: CPT

## 2023-01-16 ENCOUNTER — OFFICE VISIT (OUTPATIENT)
Dept: PHYSICAL THERAPY | Facility: HOSPITAL | Age: 68
End: 2023-01-16
Attending: Other
Payer: MEDICARE

## 2023-01-16 PROCEDURE — 97112 NEUROMUSCULAR REEDUCATION: CPT

## 2023-01-17 ENCOUNTER — APPOINTMENT (OUTPATIENT)
Dept: PHYSICAL THERAPY | Facility: HOSPITAL | Age: 68
End: 2023-01-17
Attending: Other
Payer: MEDICARE

## 2023-01-18 ENCOUNTER — OFFICE VISIT (OUTPATIENT)
Dept: PHYSICAL THERAPY | Facility: HOSPITAL | Age: 68
End: 2023-01-18
Attending: Other
Payer: MEDICARE

## 2023-01-18 PROCEDURE — 97112 NEUROMUSCULAR REEDUCATION: CPT

## 2023-01-19 ENCOUNTER — APPOINTMENT (OUTPATIENT)
Dept: PHYSICAL THERAPY | Facility: HOSPITAL | Age: 68
End: 2023-01-19
Attending: Other
Payer: MEDICARE

## 2023-01-23 ENCOUNTER — OFFICE VISIT (OUTPATIENT)
Dept: PHYSICAL THERAPY | Facility: HOSPITAL | Age: 68
End: 2023-01-23
Attending: Other
Payer: MEDICARE

## 2023-01-23 PROCEDURE — 97112 NEUROMUSCULAR REEDUCATION: CPT

## 2023-01-24 ENCOUNTER — APPOINTMENT (OUTPATIENT)
Dept: PHYSICAL THERAPY | Facility: HOSPITAL | Age: 68
End: 2023-01-24
Attending: Other
Payer: MEDICARE

## 2023-01-25 ENCOUNTER — APPOINTMENT (OUTPATIENT)
Dept: PHYSICAL THERAPY | Facility: HOSPITAL | Age: 68
End: 2023-01-25
Attending: Other
Payer: MEDICARE

## 2023-01-26 ENCOUNTER — OFFICE VISIT (OUTPATIENT)
Dept: PHYSICAL THERAPY | Facility: HOSPITAL | Age: 68
End: 2023-01-26
Attending: Other
Payer: MEDICARE

## 2023-01-26 PROCEDURE — 97112 NEUROMUSCULAR REEDUCATION: CPT

## 2023-01-30 ENCOUNTER — OFFICE VISIT (OUTPATIENT)
Dept: PHYSICAL THERAPY | Facility: HOSPITAL | Age: 68
End: 2023-01-30
Attending: Other
Payer: MEDICARE

## 2023-01-30 PROCEDURE — 97112 NEUROMUSCULAR REEDUCATION: CPT

## 2023-01-31 ENCOUNTER — APPOINTMENT (OUTPATIENT)
Dept: PHYSICAL THERAPY | Facility: HOSPITAL | Age: 68
End: 2023-01-31
Attending: Other
Payer: MEDICARE

## 2023-02-01 ENCOUNTER — APPOINTMENT (OUTPATIENT)
Dept: PHYSICAL THERAPY | Facility: HOSPITAL | Age: 68
End: 2023-02-01
Attending: Other
Payer: MEDICARE

## 2023-02-02 ENCOUNTER — OFFICE VISIT (OUTPATIENT)
Dept: PHYSICAL THERAPY | Facility: HOSPITAL | Age: 68
End: 2023-02-02
Attending: Other
Payer: MEDICARE

## 2023-02-02 PROCEDURE — 97112 NEUROMUSCULAR REEDUCATION: CPT

## 2023-02-02 PROCEDURE — 97110 THERAPEUTIC EXERCISES: CPT

## 2023-02-08 DIAGNOSIS — G25.81 RESTLESS LEG: ICD-10-CM

## 2023-02-09 DIAGNOSIS — G25.81 RESTLESS LEG: ICD-10-CM

## 2023-02-09 NOTE — TELEPHONE ENCOUNTER
The patient is requesting a refill on: GABAPENTIN 300 MG     I spoke with the patient and he stated he is taking 8 tablets daily - Take 2 tablets in the morning, afternoon, evening and before bed.     Last OV: 1/9/23  Next OV: 5/8/23  Last refilled: 12/6/23

## 2023-02-14 RX ORDER — GABAPENTIN 300 MG/1
CAPSULE ORAL
Qty: 230 CAPSULE | Refills: 1 | Status: SHIPPED | OUTPATIENT
Start: 2023-02-14

## 2023-02-14 RX ORDER — GABAPENTIN 300 MG/1
CAPSULE ORAL
Qty: 150 CAPSULE | Refills: 2 | Status: SHIPPED | OUTPATIENT
Start: 2023-02-14

## 2023-03-06 ENCOUNTER — PATIENT OUTREACH (OUTPATIENT)
Dept: CASE MANAGEMENT | Age: 68
End: 2023-03-06

## 2023-03-06 NOTE — PROCEDURES
The office order for Diabetes registry removal request is Approved and finalized on March 6, 2023.     Thanks,  [facility] [employer]

## 2023-04-03 DIAGNOSIS — G25.0 ESSENTIAL TREMOR: ICD-10-CM

## 2023-04-04 NOTE — TELEPHONE ENCOUNTER
Medication: Propranolol 20mg, 1/2 tab po TID PRN    LOV: 1/9/23  NOV: 5/8/23    Last refill/[place]: 12/6/22 (#180/0RF)

## 2023-04-05 RX ORDER — PROPRANOLOL HYDROCHLORIDE 20 MG/1
TABLET ORAL
Qty: 180 TABLET | Refills: 0 | Status: SHIPPED | OUTPATIENT
Start: 2023-04-05

## 2023-05-04 RX ORDER — TOPIRAMATE 25 MG/1
TABLET ORAL
Qty: 30 TABLET | Refills: 2 | Status: SHIPPED | OUTPATIENT
Start: 2023-05-04

## 2023-05-16 ENCOUNTER — OFFICE VISIT (OUTPATIENT)
Dept: NEUROLOGY | Facility: CLINIC | Age: 68
End: 2023-05-16
Payer: MEDICARE

## 2023-05-16 VITALS
BODY MASS INDEX: 30.31 KG/M2 | HEART RATE: 70 BPM | SYSTOLIC BLOOD PRESSURE: 118 MMHG | HEIGHT: 68 IN | DIASTOLIC BLOOD PRESSURE: 72 MMHG | WEIGHT: 200 LBS

## 2023-05-16 DIAGNOSIS — G25.81 RESTLESS LEG: ICD-10-CM

## 2023-05-16 DIAGNOSIS — G47.33 OSA (OBSTRUCTIVE SLEEP APNEA): ICD-10-CM

## 2023-05-16 DIAGNOSIS — G20 PD (PARKINSON'S DISEASE) (HCC): Primary | ICD-10-CM

## 2023-05-16 PROCEDURE — 99215 OFFICE O/P EST HI 40 MIN: CPT | Performed by: OTHER

## 2023-05-16 RX ORDER — GABAPENTIN 300 MG/1
CAPSULE ORAL
Qty: 1260 CAPSULE | Refills: 0 | Status: SHIPPED | OUTPATIENT
Start: 2023-05-16 | End: 2023-11-12

## 2023-05-16 NOTE — PATIENT INSTRUCTIONS
To avoid choking:  Behavioral steps to improve swallowing include:   Removing distractions during meals (do not watch television while eating)  Not talking while eating. Putting the fork or spoon down between bites to reduce rushing food. Clearing the mouth with small amounts of water between each mouthful. Mincing or pureeing foods can help, and patients should avoid foods with multiple consistencies (soup or stews with large solids). Ensure the chin and neck are in a neutral position.

## 2023-05-23 ENCOUNTER — PATIENT MESSAGE (OUTPATIENT)
Dept: NEUROLOGY | Facility: CLINIC | Age: 68
End: 2023-05-23

## 2023-05-23 ENCOUNTER — TELEPHONE (OUTPATIENT)
Dept: PHYSICAL THERAPY | Facility: HOSPITAL | Age: 68
End: 2023-05-23

## 2023-05-24 NOTE — TELEPHONE ENCOUNTER
From: Nabeel Michel  To: Camron Francois DO  Sent: 5/23/2023 7:33 PM CDT  Subject: Speech Therapy Appointment     In order for me to make a Speech Therapy Appointment you will need to put in an order for a Swallow Test or Video Swallow Test ( not sure if they are the same). Once the order is in I can schedule mary that and the therapist can be scheduled.  Thank you

## 2023-05-25 DIAGNOSIS — G20 PD (PARKINSON'S DISEASE) (HCC): Primary | ICD-10-CM

## 2023-05-26 NOTE — TELEPHONE ENCOUNTER
Hi,  It does not make sense that he would need a swallow study. The referral is for loud therapy for Parkinson disease. Patient's can get this through our order set. I ordered before but never set up the speech therapy component of the LVST big and loud therapy. So I ordered only as speech therapy. I will reorder it. Can someone please find out why he did not get it originally?   Thanks

## 2023-07-10 ENCOUNTER — APPOINTMENT (OUTPATIENT)
Dept: PHYSICAL THERAPY | Facility: HOSPITAL | Age: 68
End: 2023-07-10
Attending: Other
Payer: MEDICARE

## 2023-07-17 ENCOUNTER — APPOINTMENT (OUTPATIENT)
Dept: PHYSICAL THERAPY | Facility: HOSPITAL | Age: 68
End: 2023-07-17
Attending: Other
Payer: MEDICARE

## 2023-07-18 ENCOUNTER — APPOINTMENT (OUTPATIENT)
Dept: PHYSICAL THERAPY | Facility: HOSPITAL | Age: 68
End: 2023-07-18
Attending: Other
Payer: MEDICARE

## 2023-07-19 ENCOUNTER — APPOINTMENT (OUTPATIENT)
Dept: PHYSICAL THERAPY | Facility: HOSPITAL | Age: 68
End: 2023-07-19
Attending: Other
Payer: MEDICARE

## 2023-07-19 ENCOUNTER — TELEPHONE (OUTPATIENT)
Dept: SPEECH THERAPY | Facility: HOSPITAL | Age: 68
End: 2023-07-19

## 2023-07-20 ENCOUNTER — APPOINTMENT (OUTPATIENT)
Dept: PHYSICAL THERAPY | Facility: HOSPITAL | Age: 68
End: 2023-07-20
Attending: Other
Payer: MEDICARE

## 2023-07-24 ENCOUNTER — APPOINTMENT (OUTPATIENT)
Dept: PHYSICAL THERAPY | Facility: HOSPITAL | Age: 68
End: 2023-07-24
Attending: Other
Payer: MEDICARE

## 2023-07-25 ENCOUNTER — APPOINTMENT (OUTPATIENT)
Dept: PHYSICAL THERAPY | Facility: HOSPITAL | Age: 68
End: 2023-07-25
Attending: Other
Payer: MEDICARE

## 2023-07-26 ENCOUNTER — APPOINTMENT (OUTPATIENT)
Dept: PHYSICAL THERAPY | Facility: HOSPITAL | Age: 68
End: 2023-07-26
Attending: Other
Payer: MEDICARE

## 2023-07-31 ENCOUNTER — APPOINTMENT (OUTPATIENT)
Dept: PHYSICAL THERAPY | Facility: HOSPITAL | Age: 68
End: 2023-07-31
Attending: Other
Payer: MEDICARE

## 2023-08-01 ENCOUNTER — APPOINTMENT (OUTPATIENT)
Dept: PHYSICAL THERAPY | Facility: HOSPITAL | Age: 68
End: 2023-08-01
Attending: Other
Payer: MEDICARE

## 2023-08-02 ENCOUNTER — APPOINTMENT (OUTPATIENT)
Dept: PHYSICAL THERAPY | Facility: HOSPITAL | Age: 68
End: 2023-08-02
Attending: Other
Payer: MEDICARE

## 2023-08-03 ENCOUNTER — APPOINTMENT (OUTPATIENT)
Dept: PHYSICAL THERAPY | Facility: HOSPITAL | Age: 68
End: 2023-08-03
Attending: Other
Payer: MEDICARE

## 2023-08-07 ENCOUNTER — APPOINTMENT (OUTPATIENT)
Dept: PHYSICAL THERAPY | Facility: HOSPITAL | Age: 68
End: 2023-08-07
Attending: Other
Payer: MEDICARE

## 2023-08-08 ENCOUNTER — APPOINTMENT (OUTPATIENT)
Dept: PHYSICAL THERAPY | Facility: HOSPITAL | Age: 68
End: 2023-08-08
Attending: Other
Payer: MEDICARE

## 2023-08-09 ENCOUNTER — APPOINTMENT (OUTPATIENT)
Dept: PHYSICAL THERAPY | Facility: HOSPITAL | Age: 68
End: 2023-08-09
Attending: Other
Payer: MEDICARE

## 2023-08-10 ENCOUNTER — APPOINTMENT (OUTPATIENT)
Dept: PHYSICAL THERAPY | Facility: HOSPITAL | Age: 68
End: 2023-08-10
Attending: Other
Payer: MEDICARE

## 2023-08-14 ENCOUNTER — APPOINTMENT (OUTPATIENT)
Dept: PHYSICAL THERAPY | Facility: HOSPITAL | Age: 68
End: 2023-08-14
Attending: Other
Payer: MEDICARE

## 2023-09-01 ENCOUNTER — HOSPITAL ENCOUNTER (OUTPATIENT)
Dept: GENERAL RADIOLOGY | Facility: HOSPITAL | Age: 68
Discharge: HOME | End: 2023-09-01
Attending: Other
Payer: MEDICARE

## 2023-09-01 DIAGNOSIS — G20 PD (PARKINSON'S DISEASE) (HCC): ICD-10-CM

## 2023-09-01 PROCEDURE — 74230 X-RAY XM SWLNG FUNCJ C+: CPT | Performed by: OTHER

## 2023-09-08 DIAGNOSIS — G20 PD (PARKINSON'S DISEASE) (HCC): Primary | ICD-10-CM

## 2023-09-11 ENCOUNTER — OFFICE VISIT (OUTPATIENT)
Dept: SPEECH THERAPY | Age: 68
End: 2023-09-11
Attending: Other
Payer: MEDICARE

## 2023-09-11 DIAGNOSIS — G20 PD (PARKINSON'S DISEASE) (HCC): Primary | ICD-10-CM

## 2023-09-11 PROCEDURE — 92526 ORAL FUNCTION THERAPY: CPT

## 2023-09-11 PROCEDURE — 92610 EVALUATE SWALLOWING FUNCTION: CPT

## 2023-09-11 NOTE — PROGRESS NOTES
ADULT SWALLOWING EVALUATION:     Diagnosis:   PD (Parkinson's disease) ([address] ([place]) Oropharyngeal Dysphagia R13.12      Referring Provider: Phil Perry  Date of Evaluation:    9/11/2023    Precautions:  Aspiration Next MD visit:   none scheduled  Date of Surgery: n/a     PATIENT SUMMARY   Bhupinder Teran is a 76year old male who presents to therapy today with complaints of difficulty swallowing for the last year to year and a half. Pt reports food and liquid stopping momentarily in his throat and needs to swallow a second time to get it to go down. He coughs on food approximately once a month and feel like food particles are going down into his lungs. He coughs aggressively and sometimes feels it in his airway for 24 hours. He denies coughing/choking with liquids. He notes occasional shortness of breath when eating and drinking. He denies unintentional weight loss and odynophagia. Pt was diagnosed with Parkinson's disease December 2021. Current functional limitations include coughing and choking with food and liquid. .  Pain: none reported. Recent Medical History:   Past Medical History:   Diagnosis Date    ADHD (attention deficit hyperactivity disorder)     Anxiety state     BPH (benign prostatic hyperplasia)     BPH (benign prostatic hypertrophy)     Childhood asthma     Depression     Dissociative identity disorder [facility])     DVT (deep venous thrombosis) (HCC)     right leg    Esophageal reflux     H/O gastric bypass 01/28/2019    for weight loss    H/O tracheostomy     Age 2. 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     History of blood transfusion     45 yrs ago    Hypertension     Lipid screening 6/21/13    Marginal ulcer 9/21/2022    COLBY on CPAP     Dr. Cook Captain    Sleep apnea     CPAP    Tubular adenoma of colon 2022    repeat CLN in 5 years    Visual impairment     eyeglasses     Current Medications: gabapentin 300 MG Oral Cap, Take 2 capsules (600 mg total) by mouth every morning AND 2 capsules (600 mg total) Every afternoon at 2:00 pm AND 3 capsules (900 mg total) nightly. Take 2 capsules in the morning, 2 capsules in the afternoon, and 3 cap  in the evening 2 hrs before bedtime. ., Disp: 1260 capsule, Rfl: 0  carbidopa-levodopa  MG Oral Tab, Take 1.5 tablets by mouth 4 (four) times daily. Take it 4 times a day every 5 hours. , Disp: 540 tablet, Rfl: 1  TOPIRAMATE 25 MG Oral Tab, TAKE 1 TABLET(25 MG) BY MOUTH EVERY EVENING, Disp: 30 tablet, Rfl: 2  fluticasone propionate 50 MCG/ACT Nasal Suspension, 2 sprays by Nasal route 2 (two) times daily. , Disp: 1 each, Rfl: 3  Omeprazole 40 MG Oral Capsule Delayed Release, Take 1 capsule (40 mg total) by mouth daily. , Disp: 90 capsule, Rfl: 3  sildenafil 20 MG Oral Tab, Take 1 tablet (20 mg total) by mouth daily as needed. , Disp: , Rfl:   Cyanocobalamin (NASCOBAL) 500 MCG/0.1ML Nasal Solution, 1 spray by Nasal route every 7 days. , Disp: 4 each, Rfl: 10  tamsulosin (FLOMAX) cap, Take 1 capsule (0.4 mg total) by mouth nightly. TAKE AT BEDTIME, Disp: , Rfl:   BUPROPION  MG Oral Tab, TAKE 1 TABLET BY MOUTH THREE TIMES DAILY, Disp: 90 tablet, Rfl: 0  Multiple Vitamins-Minerals (BARIATRIC MULTIVITAMINS/IRON OR), Take by mouth., Disp: , Rfl:     No current facility-administered medications on file prior to visit. SWALLOWING HISTORY  Current Swallowing Diagnosis: oropharyngeal dysphagia R13.12    History of Recent: No recent respiratory illness    Daphnie Culp describes prior level of function worsening for the last year. Pt goals include wanting to eat without coughing or feeling like food is going down the wrong way. .    Gaston Mejia presents to speech therapy evaluation with primary c/o difficulty swallowing. The results of the objective tests and measures show mild oropharyngeal dysphagia. Pt and SLP discussed evaluation findings, pathology, POC and HEP. Pt voiced understanding and performs HEP correctly.  Skilled Speech Therapy is medically necessary to address the above impairments and reach functional goals. OBJECTIVE:   ORAL MOTOR EXAMINATION  Dentition: WFL  Facial and Oral Structure/Appearance: WFL  Symmetry: WFL  Strength: reduced throughout  Tone: WFL  Range of Motion: WFL  Rate of Motion: reduced throughout    Oral phase of swallow - Impaired - Reduced bolus formation, control and containment resulted in premature spillage with thin and thick liquids. Minimal oral residue was cleared with subsequent swallow. Reduced excursion of velum resulted in thin liquid penetration into nasopharynx, increased with use of chin tuck and cup drinking versus straw drinking. Pharyngeal Phase of Swallow - Impaired - The pharyngeal response triggered at the tongue base for puree and solids and at the valleculae to pyriform sinuses for thin and mildly thick liquids due to premature spillage. This premature spillage along with delayed epiglottic laryngeal closure resulted in laryngeal penetration before the swallow with thin liquids by cup and straw, with and without ejection from the upper airway. Penetration increased in depth to the vocal cords with consecutive drinks. A chin tuck reduced the penetration of thin liquids to shallow and transient to none. Slightly less penetration via straw than cup. With mildly thick liquids, shallow transient laryngeal penetration was observed. No definite aspiration occurred with any consistency. Mildly reduced base of tongue retraction resulted in mild vallecular retention with puree and solid consistencies and was reduced to minimal with liquids. Hyolaryngeal excursion was adequate. Compensatory Swallow Strategies Utilized: Strategies utilized: chin tuck, multiple swallows, liquid wash, cough/swallow throughout the meal.       Today's Treatment:  Pt education was provided on exam findings, treatment diagnosis, treatment plan, expectations, and prognosis.  Pt was also provided recommendations for oral exercises, pharyngeal exercises, and aspiration precautions      Patient was instructed in and issued a HEP for: oral exercises, pharyngeal exercises, and aspiration precautions     Charges: Jozef x1, [id number]      Total Timed Treatment: 45 min     Total Treatment Time: 45 min     PLAN OF CARE:    Goals: (to be met in 6 visits)   1) Nicolette Snyder will perform oral and pharyngeal exercises with 100% accuracy independently to increase strength and function. 2) Nicolette Snyder will use aspiration precautions with all oral intake. Frequency / Duration: Patient will be seen for 1x/week or a total of 6 visits over a 90 day period. Treatment will include: speech therapy, dysphagia therapy    Education or treatment limitation: Compliance  Rehab Potential:excellent    Patient/Family/Caregiver was advised of these findings, precautions, and treatment options and has agreed to actively participate in planning and for this course of care. Thank you for your referral. Please co-sign or sign and return this letter via fax as soon as possible to [phone]. If you have any questions, please contact me at Dept: [phone]    Sincerely,  Electronically signed by therapist: Misael Wakefield MS, CCC-SLP/L  Licensed Speech-Language Pathologist    [de-identified] certification required: Yes  I certify the need for these services furnished under this plan of treatment and while under my care.     X___________________________________________________ Date____________________    Certification From: 7/07/5170  To:12/10/2023

## 2023-09-18 ENCOUNTER — OFFICE VISIT (OUTPATIENT)
Dept: SPEECH THERAPY | Age: 68
End: 2023-09-18
Attending: Other
Payer: MEDICARE

## 2023-09-18 PROCEDURE — 92526 ORAL FUNCTION THERAPY: CPT

## 2023-09-25 ENCOUNTER — OFFICE VISIT (OUTPATIENT)
Dept: SPEECH THERAPY | Age: 68
End: 2023-09-25
Attending: Other
Payer: MEDICARE

## 2023-09-25 PROCEDURE — 92526 ORAL FUNCTION THERAPY: CPT

## 2023-09-25 NOTE — PROGRESS NOTES
Diagnosis:   PD (Parkinson's disease) ([facility] [address] ([facility]) Oropharyngeal Dysphagia R13.12        Referring Provider: Carola Fox  Date of Evaluation:   9/11/2023    Precautions:  Aspiration Next MD visit:   none scheduled  Date of Surgery: n/a   Insurance Primary/Secondary: MEDICARE / [facility]       # Auth Visits: 10 visits     Date POC Expires: 12/10/2023     Total Treatment time: 45 min  Charges: [id number]         Treatment Number: 3/10    Subjective: Patient was able to complete HEP 1-2x/day     Pain: 0/10     Objective/Goals: (to be met in 6 visits)   1) [facility] will perform oral and pharyngeal exercises with 100% accuracy independently to increase strength and function. Progress - all exercises were reviewed and demonstrated for the patient. Some are causing him to yawn or have jaw pain. His ROM was adjusted slightly and helped reduce this. 2) [facility] will use aspiration precautions with all oral intake. Progress - he is noticing ease with eating certain things after completing the exercises. HEP: given to target oral and pharyngeal exercises and aspiration precautions. Education: provided on LSVT LOUD strategies. Assessment: Patient completed the HEP to the best of his ability but he is not able to complete it 3x/day as advised. He has noted some improvement with swallowing various foods this week. Plan: target oral and pharyngeal exercises.

## 2023-09-26 RX ORDER — TOPIRAMATE 25 MG/1
25 TABLET ORAL EVERY EVENING
Qty: 30 TABLET | Refills: 2 | OUTPATIENT
Start: 2023-09-26

## 2023-10-02 ENCOUNTER — OFFICE VISIT (OUTPATIENT)
Dept: SPEECH THERAPY | Age: 68
End: 2023-10-02
Attending: Other
Payer: MEDICARE

## 2023-10-02 PROCEDURE — 92526 ORAL FUNCTION THERAPY: CPT

## 2023-10-02 NOTE — PROGRESS NOTES
Diagnosis:   PD (Parkinson's disease) ([facility] [address] (G20) Oropharyngeal Dysphagia R13.12        Referring Provider: Quique Osborne  Date of Evaluation:   9/11/2023    Precautions:  Aspiration Next MD visit:   none scheduled  Date of Surgery: n/a   Insurance Primary/Secondary: MEDICARE / 3 [facility]       # Auth Visits: 10 visits     Date POC Expires: 12/10/2023     Total Treatment time: 45 min  Charges: [id number]         Treatment Number: 4/10    Subjective: Patient was able to complete HEP 1-2x/day     Pain: 0/10     Objective/Goals: (to be met in 6 visits)   1) Nikolas Kumar will perform oral and pharyngeal exercises with 100% accuracy independently to increase strength and function. Progress - all exercises were reviewed and demonstrated for the patient. Some are causing him to yawn or have jaw pain. His ROM was adjusted slightly and helped reduce this. 2) Nikolas Kumar will use aspiration precautions with all oral intake. Progress - he is noticing ease with eating certain things after completing the exercises. HEP: given to target oral and pharyngeal exercises and aspiration precautions. Education: provided on LSVT LOUD strategies. Assessment: Patient completed the HEP to the best of his ability but he is not able to complete it 3x/day as advised. He has noted improvement with swallowing various foods and pills this week. Plan: target oral and pharyngeal exercises.

## 2023-10-09 ENCOUNTER — APPOINTMENT (OUTPATIENT)
Dept: SPEECH THERAPY | Age: 68
End: 2023-10-09
Attending: Other
Payer: MEDICARE

## 2023-10-16 ENCOUNTER — APPOINTMENT (OUTPATIENT)
Dept: SPEECH THERAPY | Age: 68
End: 2023-10-16
Attending: Other
Payer: MEDICARE

## 2023-10-16 ENCOUNTER — TELEPHONE (OUTPATIENT)
Dept: PHYSICAL THERAPY | Facility: HOSPITAL | Age: 68
End: 2023-10-16

## 2023-10-23 ENCOUNTER — OFFICE VISIT (OUTPATIENT)
Dept: SPEECH THERAPY | Age: 68
End: 2023-10-23
Attending: Other
Payer: MEDICARE

## 2023-10-23 PROCEDURE — 92526 ORAL FUNCTION THERAPY: CPT

## 2023-10-23 NOTE — PROGRESS NOTES
Discharge Summary    Diagnosis:   PD (Parkinson's disease) ([facility] [address] (Diane Poe) Oropharyngeal Dysphagia R13.12        Referring Provider: Amina Woodson  Date of Evaluation:   9/11/2023    Precautions:  Aspiration Next MD visit:   none scheduled  Date of Surgery: n/a   Insurance Primary/Secondary: MEDICARE / 3 [facility]       # Auth Visits: 10 visits     Date POC Expires: 12/10/2023     Total Treatment time: 45 min  Charges: [id number]         Treatment Number: 5/10    Subjective: Patient was able to complete HEP 1-2x/day     Pain: 0/10     Objective/Goals: (to be met in 6 visits)   1) Rexanne Hodgkins will perform oral and pharyngeal exercises with 100% accuracy independently to increase strength and function. Progress - all exercises were reviewed and demonstrated for the patient. 2) Rexanne Hodgkins will use aspiration precautions with all oral intake. Progress - he is noticing ease with eating certain things after completing the exercises. HEP: given to target oral and pharyngeal exercises and aspiration precautions. Education: provided on LSVT LOUD strategies. Assessment: Patient completed the HEP to the best of his ability but he is not able to complete it 3x/day as advised. He has noted improvement with swallowing various foods and pills this week.        Plan: discharge with all goals met

## 2023-12-27 DIAGNOSIS — G25.81 RESTLESS LEG: ICD-10-CM

## 2023-12-27 NOTE — TELEPHONE ENCOUNTER
Requested Prescriptions     Pending Prescriptions Disp Refills    gabapentin 300 MG Oral Cap [Pharmacy Med Name: GABAPENTIN 300MG CAPSULES] 230 capsule 1     Sig: TAKE 2 CAPSULES BY MOUTH IN THE MORNING, AFTERNOON, EVENING AND BEFORE BEDTIME.  TOTAL 8 CAPSULES DAILY     LOV: 5/16/23  NOV: none    Last refill/ILPMP: 5/16/23

## 2023-12-28 RX ORDER — GABAPENTIN 300 MG/1
CAPSULE ORAL
Qty: 630 CAPSULE | Refills: 1 | Status: SHIPPED | OUTPATIENT
Start: 2023-12-28

## 2024-03-08 RX ORDER — FLUTICASONE PROPIONATE 50 MCG
2 SPRAY, SUSPENSION (ML) NASAL 2 TIMES DAILY
Qty: 16 G | Refills: 0 | Status: SHIPPED | OUTPATIENT
Start: 2024-03-08

## 2024-03-08 NOTE — TELEPHONE ENCOUNTER
Refill passed per [facility] protocol.  Requested Prescriptions   Pending Prescriptions Disp Refills    FLUTICASONE PROPIONATE 50 MCG/ACT Nasal Suspension [Pharmacy Med Name: FLUTICASONE 50MCG NASAL SP (120) RX] 16 g 0     Sig: SHAKE LIQUID AND USE 2 SPRAYS IN EACH NOSTRIL TWICE DAILY.       Allergy Medication Protocol Passed - 3/7/2024  4:24 PM        Passed - In person appointment or virtual visit in the past 12 mos or appointment in next 3 mos     Recent Outpatient Visits              4 months ago     [facility] [place], JASON    Office Visit    5 months ago     [facility] [place], [place]    Office Visit    5 months ago     [facility] [place]    Office Visit    5 months ago     [facility] [place]    Office Visit    5 months ago PD (Parkinson's disease) ([facility])    [facility] [place], JASON    Office Visit          Future Appointments         Provider Department Appt Notes    In 3 days Weiler, Colleen M, DO [facility] Physical  LST PX 11/9/22  1/10/24 Payor Down; patient advised to bring ins. card to appt or call prior to appt to verify insurance. Self pay advised if unable to verify.    In 2 months Feliciano Sen MD [facility] ([place], [place]) Weight gain                  Recent Outpatient Visits              4 months ago     [facility] [place], JASON    Office Visit    5 months ago     [facility] [place]    Office Visit    5 months ago     [facility] [place], SLP    Office Visit    5 months ago     [facility] [place]    Office Visit    5  months ago PD (Parkinson's disease) ([facility])    [facility] [place], SLP    Office Visit          Future Appointments         Provider Department Appt Notes    In 3 days Weiler, Colleen M,  [facility] Physical  LST PX 11/9/22  1/10/24 Payor Down; patient advised to bring ins. card to appt or call prior to appt to verify insurance. Self pay advised if unable to verify.    In 2 months Feliciano Sen MD [facility] Weight gain

## 2024-03-11 ENCOUNTER — LAB ENCOUNTER (OUTPATIENT)
Dept: LAB | Age: 69
End: 2024-03-11
Attending: FAMILY MEDICINE
Payer: MEDICARE

## 2024-03-11 ENCOUNTER — OFFICE VISIT (OUTPATIENT)
Dept: FAMILY MEDICINE CLINIC | Facility: CLINIC | Age: 69
End: 2024-03-11
Payer: MEDICARE

## 2024-03-11 VITALS
DIASTOLIC BLOOD PRESSURE: 77 MMHG | RESPIRATION RATE: 16 BRPM | TEMPERATURE: 98 F | WEIGHT: 204.63 LBS | BODY MASS INDEX: 31.74 KG/M2 | HEART RATE: 74 BPM | HEIGHT: 67.13 IN | SYSTOLIC BLOOD PRESSURE: 128 MMHG

## 2024-03-11 DIAGNOSIS — F41.9 ANXIETY: ICD-10-CM

## 2024-03-11 DIAGNOSIS — Z00.00 ENCOUNTER FOR ANNUAL WELLNESS EXAM IN MEDICARE PATIENT: Primary | ICD-10-CM

## 2024-03-11 DIAGNOSIS — Z98.84 H/O GASTRIC BYPASS: ICD-10-CM

## 2024-03-11 DIAGNOSIS — Z00.00 ENCOUNTER FOR ANNUAL WELLNESS EXAM IN MEDICARE PATIENT: ICD-10-CM

## 2024-03-11 DIAGNOSIS — I83.90 VARICOSE VEIN: ICD-10-CM

## 2024-03-11 DIAGNOSIS — E66.8 OTHER OBESITY: ICD-10-CM

## 2024-03-11 DIAGNOSIS — G20.A1 PARKINSON'S DISEASE, UNSPECIFIED WHETHER DYSKINESIA PRESENT, UNSPECIFIED WHETHER MANIFESTATIONS FLUCTUATE: ICD-10-CM

## 2024-03-11 LAB
ALBUMIN SERPL-MCNC: 4.4 G/DL (ref 3.2–4.8)
ALBUMIN/GLOB SERPL: 1.5 {RATIO} (ref 1–2)
ALP LIVER SERPL-CCNC: 77 U/L
ALT SERPL-CCNC: 15 U/L
ANION GAP SERPL CALC-SCNC: 4 MMOL/L (ref 0–18)
AST SERPL-CCNC: 19 U/L (ref ?–34)
BILIRUB SERPL-MCNC: 0.6 MG/DL (ref 0.2–1.1)
BUN BLD-MCNC: 18 MG/DL (ref 9–23)
BUN/CREAT SERPL: 15 (ref 10–20)
CALCIUM BLD-MCNC: 9.2 MG/DL (ref 8.7–10.4)
CHLORIDE SERPL-SCNC: 107 MMOL/L (ref 98–112)
CHOLEST SERPL-MCNC: 123 MG/DL (ref ?–200)
CO2 SERPL-SCNC: 29 MMOL/L (ref 21–32)
CREAT BLD-MCNC: 1.2 MG/DL
DEPRECATED RDW RBC AUTO: 45.1 FL (ref 35.1–46.3)
EGFRCR SERPLBLD CKD-EPI 2021: 66 ML/MIN/1.73M2 (ref 60–?)
ERYTHROCYTE [DISTWIDTH] IN BLOOD BY AUTOMATED COUNT: 13.9 % (ref 11–15)
FASTING PATIENT LIPID ANSWER: NO
FASTING STATUS PATIENT QL REPORTED: NO
GLOBULIN PLAS-MCNC: 2.9 G/DL (ref 2.8–4.4)
GLUCOSE BLD-MCNC: 138 MG/DL (ref 70–99)
HCT VFR BLD AUTO: 48.2 %
HDLC SERPL-MCNC: 42 MG/DL (ref 40–59)
HGB BLD-MCNC: 15.6 G/DL
LDLC SERPL CALC-MCNC: 63 MG/DL (ref ?–100)
MCH RBC QN AUTO: 28.8 PG (ref 26–34)
MCHC RBC AUTO-ENTMCNC: 32.4 G/DL (ref 31–37)
MCV RBC AUTO: 88.9 FL
NONHDLC SERPL-MCNC: 81 MG/DL (ref ?–130)
OSMOLALITY SERPL CALC.SUM OF ELEC: 294 MOSM/KG (ref 275–295)
PLATELET # BLD AUTO: 120 10(3)UL (ref 150–450)
POTASSIUM SERPL-SCNC: 4.3 MMOL/L (ref 3.5–5.1)
PROT SERPL-MCNC: 7.3 G/DL (ref 5.7–8.2)
RBC # BLD AUTO: 5.42 X10(6)UL
SODIUM SERPL-SCNC: 140 MMOL/L (ref 136–145)
TRIGL SERPL-MCNC: 91 MG/DL (ref 30–149)
TSI SER-ACNC: 2.73 MIU/ML (ref 0.55–4.78)
VIT B12 SERPL-MCNC: 463 PG/ML (ref 211–911)
VIT D+METAB SERPL-MCNC: 30.2 NG/ML (ref 30–100)
VLDLC SERPL CALC-MCNC: 14 MG/DL (ref 0–30)
WBC # BLD AUTO: 6.1 X10(3) UL (ref 4–11)

## 2024-03-11 PROCEDURE — 84425 ASSAY OF VITAMIN B-1: CPT

## 2024-03-11 PROCEDURE — 36415 COLL VENOUS BLD VENIPUNCTURE: CPT

## 2024-03-11 PROCEDURE — 82306 VITAMIN D 25 HYDROXY: CPT

## 2024-03-11 PROCEDURE — 80061 LIPID PANEL: CPT

## 2024-03-11 PROCEDURE — G0438 PPPS, INITIAL VISIT: HCPCS | Performed by: FAMILY MEDICINE

## 2024-03-11 PROCEDURE — 80053 COMPREHEN METABOLIC PANEL: CPT

## 2024-03-11 PROCEDURE — 84443 ASSAY THYROID STIM HORMONE: CPT

## 2024-03-11 PROCEDURE — 82607 VITAMIN B-12: CPT

## 2024-03-11 PROCEDURE — 85027 COMPLETE CBC AUTOMATED: CPT

## 2024-03-11 RX ORDER — CARBIDOPA AND LEVODOPA 25; 100 MG/1; MG/1
1 TABLET, ORALLY DISINTEGRATING ORAL 3 TIMES DAILY
COMMUNITY

## 2024-03-11 NOTE — PROGRESS NOTES
Subjective:   Wolf Crabtree is a 68 year old male who presents for a Medicare Subsequent Annual Wellness visit (Pt already had Initial Annual Wellness) and scheduled follow up of multiple significant but stable problems.         68 yr old male who presents for physical. Retired . Tries to walk frequently and boxes once weekly. Eating healthy. Has son (Az) and daughter in [place].     Diagnosed with Parkinson's last year. Seeing motion disorders specialist now at [facility]. Had memory testing at [facility] as well which was normal. Has follow-up in 2 weeks.  On Carvidopa and Gabapentin. Seems to help leg and hand shaking. Getting better sleep.     Has mild sleep apnea.  Used CPAP but it was difficult. Sleep has been good and he is not excessively tired during the day.     Follows with Bariatrics ofr weight loss. Had bowel perforation in 9/22.  Sees Dr. Sen.     Follows with Psychiatry for history of DID, ANNMARIE and major depressive disorder.     On Flomax so urination is controlled.  Sees Urologist every 6 months.     Pt has concerns about circulation in bilateral legs.  Legs are always cold. Has varicose veins, worse on the right. Legs are often tingling.  Legs feel tired and sore after 5-10 minutes of standing. He has worn compression hose in the past.  Right foot and leg swells at times.     History/Other:   Fall Risk Assessment:   He has been screened for Falls and is low risk.      Cognitive Assessment:   He had a completely normal cognitive assessment - see flowsheet entries     Functional Ability/Status:   Wolf Crabtree has some abnormal functions as listed below:  He has Dressing and/or Bathing issues based on screening of functional status.  Difficulty dressing or bathing?: No  Bathing or Showering: Cannot do without help  Dressing: Able without help  He has Hearing problems based on screening of functional status.      Depression Screening (PHQ-2/PHQ-9): PHQ-2 SCORE: 0  , done 3/11/2024   Last  [place] Suicide Screening on 3/11/2024 was No Risk.          Advanced Directives:   He does NOT have a Living Will. [Do you have a living will?: No]  He does NOT have a Power of  for Health Care. [Do you have a healthcare power of ?: No]  Discussed Advance Care Planning with patient (and family/surrogate if present). Standard forms made available to patient in After Visit Summary.      Patient Active Problem List   Diagnosis    Anxiety    Obesity (BMI 30-39.9)    H/O gastric bypass    Parkinson's disease     Allergies:  He is allergic to pantoprazole.    Current Medications:  Outpatient Medications Marked as Taking for the 3/11/24 encounter (Office Visit) with Weiler, Colleen M, DO   Medication Sig    carbidopa-levodopa  MG Oral Tablet Dispersible Take 1 tablet by mouth 3 (three) times daily.    fluticasone propionate 50 MCG/ACT Nasal Suspension 2 sprays by Nasal route 2 (two) times daily.    gabapentin 300 MG Oral Cap TAKE 2 CAPSULES BY MOUTH IN THE MORNING, AFTERNOON, EVENING AND BEFORE BEDTIME. TOTAL 8 CAPSULES DAILY    sildenafil 20 MG Oral Tab Take 1 tablet (20 mg total) by mouth daily as needed.    tamsulosin (FLOMAX) cap Take 1 capsule (0.4 mg total) by mouth nightly. TAKE AT BEDTIME    BUPROPION  MG Oral Tab TAKE 1 TABLET BY MOUTH THREE TIMES DAILY    Multiple Vitamins-Minerals (BARIATRIC MULTIVITAMINS/IRON OR) Take by mouth.       Medical History:  He  has a past medical history of ADHD (attention deficit hyperactivity disorder), Anxiety state, BPH (benign prostatic hyperplasia), BPH (benign prostatic hypertrophy), Childhood asthma ([facility]), Depression, Dissociative identity disorder ([facility]), DVT (deep venous thrombosis) ([facility]), Esophageal reflux, H/O gastric bypass (01/28/2019), H/O tracheostomy, History of blood transfusion, Hypertension, Lipid screening (6/21/13), Marginal ulcer (9/21/2022), COLBY on CPAP, Sleep apnea, Tubular adenoma of colon (2022), and Visual  impairment.  Surgical History:  He  has a past surgical history that includes tonsillectomy; appendectomy; tracheostomy or larngectomy (1952); colonoscopy (N/A, 9/26/2018); colonoscopy; other; gastric bypass,obese<100cm bassem-en-y (01/28/2019); and colonoscopy (N/A, 3/1/2022).   Family History:  His family history includes Clotting Disorder in his sister; Diabetes in his mother; Heart Disorder in his father; Hypertension in his mother; Other in his paternal grandmother; Stroke in his mother.  Social History:  He  reports that he has never smoked. He has never used smokeless tobacco. He reports current alcohol use. He reports current drug use. Drug: Cannabis.    Tobacco:  He has never smoked tobacco.    CAGE Alcohol Screen:   CAGE screening score of 0 on 3/10/2024, showing low risk of alcohol abuse.      Patient Care Team:  Weiler, Colleen M, DO as PCP - General (Family Medicine)  Bud Retana DO (NEUROLOGY)  Stephani Gibson PT as Physical Therapist (Physical Therapy)  Saad Huerta SLP as Speech Language Pathologist (Speech-Language Pathologist)    Review of Systems  ROS:   Allergic/Immuno:  Negative for environmental allergies and food allergies  Cardiovascular:  Negative for chest pain and irregular heartbeat/palpitations  Constitutional:  Negative for decreased activity, fever, irritability and lethargy  Endocrine:  Negative for abnormal sleep patterns, increased activity, polydipsia and polyphagia  ENMT:  Negative for ear drainage, hearing loss and nasal drainage  Eyes:  Negative for eye discharge and vision loss  Gastrointestinal:  Negative for abdominal pain, constipation, decreased appetite, diarrhea and vomiting  Genitourinary:  Negative for dysuria and hematuria  Hema/Lymph:  Negative for easy bleeding and easy bruising  Integumentary:  Negative for pruritus and rash  Musculoskeletal:  Negative for bone/joint symptoms  Neurological:  Negative for gait disturbance  Psychiatric:  Negative for  inappropriate interaction and psychiatric symptoms      Objective:   Physical Exam       /77   Pulse 74   Temp 97.7 °F (36.5 °C) (Temporal)   Resp 16   Ht 5' 7.13\" (1.705 m)   Wt 204 lb 9.6 oz (92.8 kg)   BMI 31.92 kg/m²  Estimated body mass index is 31.92 kg/m² as calculated from the following:    Height as of this encounter: 5' 7.13\" (1.705 m).    Weight as of this encounter: 204 lb 9.6 oz (92.8 kg).    Medicare Hearing Assessment:   Hearing Screening    Screening Method: Questionnaire  I have a problem hearing over the telephone: No I have trouble following the conversations when two or more people are talking at the same time: Sometimes   I have trouble understanding things on the TV: No I have to strain to understand conversations: Sometimes   I have to worry about missing the telephone ring or doorbell: No I have trouble hearing conversations in a noisy background such as a crowded room or restaurant: Sometimes   I get confused about where sounds come from: No I misunderstand some words in a sentence and need to ask people to repeat themselves: Sometimes   I especially have trouble understanding the speech of women and children: No I have trouble understanding the speaker in a large room such as at a meeting or place of Sikhism: No   Many people I talk to seem to mumble (or don't speak clearly): No People get annoyed because I misunderstand what they say: No   I misunderstand what others are saying and make inappropriate responses: No I avoid social activities because I cannot hear well and fear I will reply improperly: No   Family members and friends have told me they think I may have hearing loss: No             Visual Acuity:   Right Eye Visual Acuity: Corrected Right Eye Chart Acuity: 20/20   Left Eye Visual Acuity: Corrected Left Eye Chart Acuity: 20/20   Both Eyes Visual Acuity: Corrected Both Eyes Chart Acuity: 20/20   Able To Tolerate Visual Acuity: Yes    Gen:  Well-nourished.  No  distress.  HEENT: Conjunctive clear.  Kashif ear canals clear.  Kashif TMs intact with good landmarks noted.  Nares patent.  Oral mucous membrane moist.  Normal lips, teeth, and gums.  Oropharynx normal.  Neck supple.  Good ROM.  No LAD.  Thyroid normal.  CV:  Regular rate and rhythm; no murmurs  Lungs:  Clear to ausculation; good aeration               No wheezes, rales or rhonchi  Abd: soft, non-tender, non-distended          Normal bowel sounds; no masses          No hepatosplenomegaly  Ext: superficial varicose veins noted, thick, distended veins noted bilaterally      Assessment & Plan:   Wolf Crabtree is a 68 year old male who presents for a Medicare Assessment.     1. Encounter for annual wellness exam in Medicare patient (Primary)    Encouraged exercise and healthy diet. Labs done.   -     CBC, Platelet; No Differential; Future; Expected date: 03/11/2024  -     Comp Metabolic Panel (14); Future; Expected date: 03/11/2024  -     Lipid Panel; Future; Expected date: 03/11/2024  -     Assay, Thyroid Stim Hormone; Future; Expected date: 03/11/2024    2. Varicose vein    Has distended veins.  Legs get tired.  Will refer to Vascular.   -     Vascular Surgery - In Network    3. H/O gastric bypass    Doing well.  Follows with Bariatrics. Labs done.  Overview:  for weight loss  Orders:  -     Vitamin B12; Future; Expected date: 03/11/2024  -     Vitamin B1 (Thiamine), Blood; Future; Expected date: 03/11/2024  -     Vitamin D; Future; Expected date: 03/11/2024    4. Other obesity    Follows with Bariatrics.   -     Vitamin D; Future; Expected date: 03/11/2024    5. Anxiety    Controlled.  Follows with Psychiatry.    6. Parkinson's disease, unspecified whether dyskinesia present, unspecified whether manifestations fluctuate    Shaking seems to have improved.  Following with Neurology at [facility].     The patient indicates understanding of these issues and agrees to the plan.  Reinforced healthy diet, lifestyle, and  exercise.      Return in 1 year (on 3/11/2025).     Colleen Weiler, DO, 3/17/2024     Supplementary Documentation:   General Health:  In the past six months, have you lost more than 10 pounds without trying?: 2 - No  Has your appetite been poor?: No  Type of Diet: Vegetarian  How does the patient maintain a good energy level?: Appropriate Exercise  How would you describe your daily physical activity?: Moderate  How would you describe your current health state?: Good  How do you maintain positive mental well-being?: Social Interaction;Visiting Family  On a scale of 0 to 10, with 0 being no pain and 10 being severe pain, what is your pain level?: 1 - (Mild)  In the past six months, have you experienced urine leakage?: 0-No  At any time do you feel concerned for the safety/well-being of yourself and/or your children, in your home or elsewhere?: No  Have you had any immunizations at another office such as Influenza, Hepatitis B, Tetanus, or Pneumococcal?: Yes        Wolf Crabtree's SCREENING SCHEDULE   Tests on this list are recommended by your physician but may not be covered, or covered at this frequency, by your insurer.   Please check with your insurance carrier before scheduling to verify coverage.   PREVENTATIVE SERVICES FREQUENCY &  COVERAGE DETAILS LAST COMPLETION DATE   Diabetes Screening    Fasting Blood Sugar / Glucose    One screening every 12 months if never tested or if previously tested but not diagnosed with pre-diabetes   One screening every 6 months if diagnosed with pre-diabetes Lab Results   Component Value Date     (H) 03/11/2024        Cardiovascular Disease Screening    Lipid Panel  Cholesterol  Lipoprotein (HDL)  Triglycerides Covered every 5 years for all Medicare beneficiaries without apparent signs or symptoms of cardiovascular disease Lab Results   Component Value Date    CHOLEST 123 03/11/2024    HDL 42 03/11/2024    LDL 63 03/11/2024    TRIG 91 03/11/2024          Electrocardiogram (EKG)   Covered if needed at Welcome to Medicare, and non-screening if indicated for medical reasons 09/10/2022      Ultrasound Screening for Abdominal Aortic Aneurysm (AAA) Covered once in a lifetime for one of the following risk factors    Men who are 65-75 years old and have ever smoked    Anyone with a family history -     Colorectal Cancer Screening  Covered for ages 50-85; only need ONE of the following:    Colonoscopy   Covered every 10 years    Covered every 2 years if patient is at high risk or previous colonoscopy was abnormal 03/01/2022    Health Maintenance   Topic Date Due    Colorectal Cancer Screening  03/01/2027       Flexible Sigmoidoscopy   Covered every 4 years -    Fecal Occult Blood Test Covered annually -   Prostate Cancer Screening    Prostate-Specific Antigen (PSA) Annually No results found for: \"PSA\"  Health Maintenance   Topic Date Due    PSA  03/12/2014      Immunizations    Influenza Covered once per flu season  Please get every year 01/10/2024  No recommendations at this time    Pneumococcal Each vaccine ([id number] & [id number]) covered once after 65 Prevnar 13: 04/26/2018    Hrmscutmo34: 11/09/2022     No recommendations at this time    Hepatitis B One screening covered for patients with certain risk factors   08/07/2017  No recommendations at this time    Tetanus Toxoid Not covered by Medicare Part B unless medically necessary (cut with metal); may be covered with your pharmacy prescription benefits -    Tetanus, Diptheria and Pertusis TD and TDaP Not covered by Medicare Part B -  No recommendations at this time    Zoster Not covered by Medicare Part B; may be covered with your pharmacy  prescription benefits -  No recommendations at this time     Annual Monitoring of Persistent Medications (ACE/ARB, digoxin diuretics, anticonvulsants)    Potassium Annually Lab Results   Component Value Date    K 4.3 03/11/2024         Creatinine   Annually Lab Results   Component  Value Date    CREATSERUM 1.20 03/11/2024         BUN Annually Lab Results   Component Value Date    BUN 18 03/11/2024       Drug Serum Conc Annually No results found for: \"DIGOXIN\", \"DIG\", \"VALP\"

## 2024-03-16 LAB — VITAMIN B1 WHOLE BLD: 152.8 NMOL/L

## 2024-03-17 PROBLEM — Z86.718 HISTORY OF DVT (DEEP VEIN THROMBOSIS): Status: RESOLVED | Noted: 2018-08-24 | Resolved: 2024-03-17

## 2024-03-18 NOTE — PATIENT INSTRUCTIONS
Wolf Crabtree's SCREENING SCHEDULE   Tests on this list are recommended by your physician but may not be covered, or covered at this frequency, by your insurer.   Please check with your insurance carrier before scheduling to verify coverage.   PREVENTATIVE SERVICES FREQUENCY &  COVERAGE DETAILS LAST COMPLETION DATE   Diabetes Screening    Fasting Blood Sugar / Glucose    One screening every 12 months if never tested or if previously tested but not diagnosed with pre-diabetes   One screening every 6 months if diagnosed with pre-diabetes Lab Results   Component Value Date     (H) 03/11/2024        Cardiovascular Disease Screening    Lipid Panel  Cholesterol  Lipoprotein (HDL)  Triglycerides Covered every 5 years for all Medicare beneficiaries without apparent signs or symptoms of cardiovascular disease Lab Results   Component Value Date    CHOLEST 123 03/11/2024    HDL 42 03/11/2024    LDL 63 03/11/2024    TRIG 91 03/11/2024         Electrocardiogram (EKG)   Covered if needed at Welcome to Medicare, and non-screening if indicated for medical reasons 09/10/2022      Ultrasound Screening for Abdominal Aortic Aneurysm (AAA) Covered once in a lifetime for one of the following risk factors   • Men who are 65-75 years old and have ever smoked   • Anyone with a family history -     Colorectal Cancer Screening  Covered for ages 50-85; only need ONE of the following:    Colonoscopy   Covered every 10 years    Covered every 2 years if patient is at high risk or previous colonoscopy was abnormal 03/01/2022    Health Maintenance   Topic Date Due   • Colorectal Cancer Screening  03/01/2027       Flexible Sigmoidoscopy   Covered every 4 years -    Fecal Occult Blood Test Covered annually -   Prostate Cancer Screening    Prostate-Specific Antigen (PSA) Annually No results found for: \"PSA\"  Health Maintenance   Topic Date Due   • PSA  03/12/2014      Immunizations    Influenza Covered once per flu season  Please get every  year 01/10/2024  No recommendations at this time    Pneumococcal Each vaccine ([id number] & [id number]) covered once after 65 Prevnar 13: 04/26/2018    Sdfrltntr93: 11/09/2022     No recommendations at this time    Hepatitis B One screening covered for patients with certain risk factors   08/07/2017  No recommendations at this time    Tetanus Toxoid Not covered by Medicare Part B unless medically necessary (cut with metal); may be covered with your pharmacy prescription benefits -    Tetanus, Diptheria and Pertusis TD and TDaP Not covered by Medicare Part B -  No recommendations at this time    Zoster Not covered by Medicare Part B; may be covered with your pharmacy  prescription benefits -  No recommendations at this time     Annual Monitoring of Persistent Medications (ACE/ARB, digoxin diuretics, anticonvulsants)    Potassium Annually Lab Results   Component Value Date    K 4.3 03/11/2024         Creatinine   Annually Lab Results   Component Value Date    CREATSERUM 1.20 03/11/2024         BUN Annually Lab Results   Component Value Date    BUN 18 03/11/2024       Drug Serum Conc Annually No results found for: \"DIGOXIN\", \"DIG\", \"VALP\"

## 2024-03-26 ENCOUNTER — OFFICE VISIT (OUTPATIENT)
Facility: CLINIC | Age: 69
End: 2024-03-26
Payer: MEDICARE

## 2024-03-26 VITALS — RESPIRATION RATE: 18 BRPM | HEIGHT: 67.13 IN | BODY MASS INDEX: 31.64 KG/M2 | WEIGHT: 204 LBS

## 2024-03-26 DIAGNOSIS — I83.813 VARICOSE VEINS OF BILATERAL LOWER EXTREMITIES WITH PAIN: Primary | ICD-10-CM

## 2024-03-26 NOTE — PROGRESS NOTES
VASCULAR SURGERY CONSULT NOTE      Wolf Crabtree   :  1955  MR#  [id number]    REFERRING PHYSICIAN:  No ref. provider found  PRIMARY CARE PHYSICIAN:  Colleen Weiler, DO    Chief Complaint   Patient presents with    Consult    Varicose Veins     Patient is c/o VV NICOLE LEG most RT LEG cramps most at night, restless legs and moderate pain.  He doesn't wear medical compressions  RT LEG blood clots Hx 4 years ago  DM II x 10-12 years       HPI:    The patient is a 68 year old male who has been referred to the clinic today for an evaluation of his bilateral lower extremity heaviness, tiredness, edema and pain after prolonged standing. This is interfering with his activities of daily living and exercise. He has not worn compression stockings in the recent past. Patient has a prior history of unprovoked DVT in right lower extremity 4 years ago.     PAST MEDICAL HISTORY:     Past Medical History:   Diagnosis Date    ADHD (attention deficit hyperactivity disorder)     Anxiety state     BPH (benign prostatic hyperplasia)     BPH (benign prostatic hypertrophy)     Childhood asthma (HCC)     Depression     Dissociative identity disorder (HCC)     DVT (deep venous thrombosis) (HCC)     right leg    Esophageal reflux     H/O gastric bypass 2019    for weight loss    H/O tracheostomy     Age 2. 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    History of blood transfusion     45 yrs ago    Hypertension     Lipid screening 13    Marginal ulcer 2022    COLBY on CPAP     Dr. Aarno Lucas    Sleep apnea     CPAP    Tubular adenoma of colon     repeat CLN in 5 years    Visual impairment     eyeglasses       PAST SURGICAL HISTORY:     Past Surgical History:   Procedure Laterality Date    APPENDECTOMY      COLONOSCOPY N/A 2018    Procedure: COLONOSCOPY;  Surgeon: HILTON Hansen MD;  Location: [facility] ENDOSCOPY    COLONOSCOPY      COLONOSCOPY N/A 3/1/2022    Procedure: COLONOSCOPY;  Surgeon: HILTON Hansen MD;   Location: [facility] ENDOSCOPY    GASTRIC BYPASS,OBESE<100CM DIANN-EN-Y  01/28/2019    Dr Campbell, [facility]    OTHER      right big toe surgery    TONSILLECTOMY      TRACHEOSTOMY OR LARNGECTOMY  1952    as child for croup        MEDICATIONS:     Current Outpatient Medications   Medication Sig Dispense Refill    carbidopa-levodopa  MG Oral Tablet Dispersible Take 1 tablet by mouth 3 (three) times daily.      fluticasone propionate 50 MCG/ACT Nasal Suspension 2 sprays by Nasal route 2 (two) times daily. 16 g 0    gabapentin 300 MG Oral Cap TAKE 2 CAPSULES BY MOUTH IN THE MORNING, AFTERNOON, EVENING AND BEFORE BEDTIME. TOTAL 8 CAPSULES DAILY 630 capsule 1    sildenafil 20 MG Oral Tab Take 1 tablet (20 mg total) by mouth daily as needed.      tamsulosin (FLOMAX) cap Take 1 capsule (0.4 mg total) by mouth nightly. TAKE AT BEDTIME      BUPROPION  MG Oral Tab TAKE 1 TABLET BY MOUTH THREE TIMES DAILY 90 tablet 0    Multiple Vitamins-Minerals (BARIATRIC MULTIVITAMINS/IRON OR) Take by mouth.         ALLERGIES:     Allergies   Allergen Reactions    Pantoprazole RASH       SOCIAL HISTORY:     Social History     Socioeconomic History    Marital status: Single   Tobacco Use    Smoking status: Never    Smokeless tobacco: Never   Vaping Use    Vaping Use: Never used   Substance and Sexual Activity    Alcohol use: Yes     Comment: Beer & Wine rarely    Drug use: Yes     Types: Cannabis   Other Topics Concern    Caffeine Concern No    Exercise Yes   Social History Narrative    Live with girlfriend    Work as [employer]        FAMILY HISTORY:     Family History   Problem Relation Age of Onset    Heart Disorder Father     Diabetes Mother     Hypertension Mother     Stroke Mother         Per NG CVA    Clotting Disorder Sister     Other (Other) Paternal Grandmother         Rheumatoid Arthritis       ROS:   A 12 point review of systems with pertinent positives and negatives listed in the HPI.    PHYSICAL EXAM:   Resp 18    Ht 5' 7.13\" (1.705 m)   Wt 204 lb (92.5 kg)   BMI 31.83 kg/m²   GENERAL: alert and orientated X 3, well developed, well nourished, in no apparent distress  HEENT: ears and throat are clear  NECK: supple, no lymphadenopathy, thyroid wnl  CAROTID: No bruits  RESPIRATORY: no rales, rhonchi, or wheezes B  CARDIO: RRR without murmur, no murmur, no gallop   ABDOMEN: soft, non-tender with no palpable aneurysm or masses  BACK: normal, no tenderness  SKIN: no rashes, warm and dry  NEURO/PSYCH: orientated x3, normal mood and affect, no sensory or motor deficit  EXTREMITIES: full range of motion, no tenderness or edema in either leg.   VASCULAR  Pulse exam right: femoral 2+, DP  2+, PT  2+  Pulse exam left: femoral  2+, DP  2+, PT  2+      Vein Assessment:      Mild scattered bilateral  LE varicose veins with no significant hemosiderin deposition    IMPRESSION:   Bilateral  lower extremity pain due to venous insufficiency.   Symptomatic calf varicosities.    PLAN:     We reviewed the options for management for venous insufficiency, including conservative therapy, sclerotherapy, stab phlebectomy, and endovenous laser ablation. The patient was educated in the benign condition of venous disease.  I have given the patient a prescription for Grade II compression stockings (20-30 mmHg, thigh-highs). We reviewed the importance of wearing these daily and consistently. We also reviewed other types of conservative measures, such as periodic leg elevation, walking for exercise, analgesic use, attempts at weight loss, and the avoidance of prolonged standing.  I have sent the patient for a venous reflux ultrasound. Should the ultrasound study reveal venous reflux with dilation and he does not have relief of symptoms with conservative therapy, then endovenous laser ablation may a possible treatment option.  I explained to the patient that his insurance company may require a 6-12 week trial period of conservative therapy prior to  authorizing venous ablation treatment.  The patient understood and agreed to proceed with this treatment plan, all of his questions were answered during this clinic visit. He was asked to FU in several weeks to assess his response to conservative treatment.      Thank you for allowing to participate in the care of your patient.     NOEL Bowman  Division of Vascular Surgery with Dr. Najjar.

## 2024-03-26 NOTE — PATIENT INSTRUCTIONS
Please call and make an appointment for your compression stockings. The list below are different vendors who have compression stockings available. The ones with asterisks accept insurance.        Adequate (%) 8

## 2024-04-08 ENCOUNTER — HOSPITAL ENCOUNTER (OUTPATIENT)
Dept: ULTRASOUND IMAGING | Age: 69
Discharge: HOME | End: 2024-04-08
Payer: MEDICARE

## 2024-04-08 DIAGNOSIS — I83.813 VARICOSE VEINS OF BILATERAL LOWER EXTREMITIES WITH PAIN: ICD-10-CM

## 2024-04-08 PROCEDURE — 93970 EXTREMITY STUDY: CPT

## 2024-05-09 ENCOUNTER — TELEPHONE (OUTPATIENT)
Facility: CLINIC | Age: 69
End: 2024-05-09

## 2024-05-09 NOTE — TELEPHONE ENCOUNTER
Left voicemail for patient that proivder was called away to hospital and we need to reschedule    Fall precautions

## 2024-05-14 ENCOUNTER — OFFICE VISIT (OUTPATIENT)
Facility: CLINIC | Age: 69
End: 2024-05-14

## 2024-05-14 VITALS — BODY MASS INDEX: 31.64 KG/M2 | HEIGHT: 67.13 IN | RESPIRATION RATE: 18 BRPM | WEIGHT: 204 LBS

## 2024-05-14 DIAGNOSIS — I83.813 VARICOSE VEINS OF BILATERAL LOWER EXTREMITIES WITH PAIN: Primary | ICD-10-CM

## 2024-05-14 NOTE — PROGRESS NOTES
Samer F. Najjar, MD.  Vascular and Endovascular Surgery          VASCULAR SURGERY VISIT NOTE       Wolf Crabtree   :  1955  MR#  [id number]    REFERRING PHYSICIAN:  Colleen M Weiler  PRIMARY CARE PHYSICIAN:  Colleen Weiler, DO      HPI:    The patient is a 68 year old male who is here for follow-up for his bilateral lower extremity heaviness, tiredness and pain after prolonged standing. This continues to interfere with his activities of daily living and exercise.  He was seen previously and determined to benefit from a trial of conservative therapy for venous insufficiency diagnosed clinically and after a positive venous reflux study. Conservative measures including periodic leg elevation, walking for exercise, attempts at weight loss, avoiding prolonged standing, and oral analgesics including wearing compression Grade II compression stockings (20-30 mm Hg) for greater than 6 weeks. All of these afforts have all failed to control the severe symptoms. He reports that the stockings were not helpful as his symptoms are unchanged.   He is interested in a more permanent treatment. There has been no change in his medical or surgical history since the last visit.      MEDICATIONS:     Current Outpatient Medications   Medication Sig Dispense Refill    fluticasone propionate 50 MCG/ACT Nasal Suspension 2 sprays by Nasal route 2 (two) times daily. 16 g 0    gabapentin 300 MG Oral Cap TAKE 2 CAPSULES BY MOUTH IN THE MORNING, AFTERNOON, EVENING AND BEFORE BEDTIME. TOTAL 8 CAPSULES DAILY 630 capsule 1    sildenafil 20 MG Oral Tab Take 1 tablet (20 mg total) by mouth daily as needed.      tamsulosin (FLOMAX) cap Take 1 capsule (0.4 mg total) by mouth nightly. TAKE AT BEDTIME      BUPROPION  MG Oral Tab TAKE 1 TABLET BY MOUTH THREE TIMES DAILY 90 tablet 0    Multiple Vitamins-Minerals (BARIATRIC MULTIVITAMINS/IRON OR) Take by mouth.      carbidopa-levodopa  MG Oral Tablet Dispersible Take 1 tablet by mouth  3 (three) times daily.         ALLERGIES:     Allergies   Allergen Reactions    Pantoprazole RASH       PHYSICAL EXAM:   Resp 18   Ht 5' 7.13\" (1.705 m)   Wt 204 lb (92.5 kg)   BMI 31.83 kg/m²     Scattered varicose veins along the bilateral LE        IMPRESSION:   Bilateral lower extremity pain due to venous insufficiency.   Symptomatic calf varicosities.    PLAN:     We reviewed the options for management for venous insufficiency, including conservative therapy, medical-grade compression hose, sclerotherapy, stab phlebectomy, and endovenous laser ablation. The patient was again educated in the benign condition of venous disease.  Given that the patient has worn the compression stockings for the past several weeks without improvement, I have recommended endovenous therapy with either radiofrequency ablation (RFA)  for his refluxing veins (B GSV) in addition to the possible need for stab phlebectomies. We discussed the risks of DVT/PE/death, bleeding, allergic reaction, nerve injury, infection, hyperpigmentation, chronic pain, recurrence and need for further interventions among other complications.   The patient understood and agreed to proceed with this treatment plan. All of his questions were answered during this clinic visit.   He wishes to proceed once approval is attained.    Thank you for allowing to participate in the care of your patients . Please do not hesitate to contact my office with any questions.      Sincerely,  Samer F. Najjar MD  Division of Vascular Surgery

## 2024-05-30 ENCOUNTER — OFFICE VISIT (OUTPATIENT)
Dept: SURGERY | Facility: CLINIC | Age: 69
End: 2024-05-30

## 2024-05-30 VITALS
SYSTOLIC BLOOD PRESSURE: 136 MMHG | DIASTOLIC BLOOD PRESSURE: 78 MMHG | HEIGHT: 67 IN | BODY MASS INDEX: 34.08 KG/M2 | OXYGEN SATURATION: 96 % | HEART RATE: 70 BPM | WEIGHT: 217.13 LBS

## 2024-05-30 DIAGNOSIS — Z98.84 H/O GASTRIC BYPASS: ICD-10-CM

## 2024-05-30 DIAGNOSIS — E66.9 OBESITY (BMI 30-39.9): ICD-10-CM

## 2024-05-30 DIAGNOSIS — E44.1 MILD PROTEIN-CALORIE MALNUTRITION (HCC): Primary | ICD-10-CM

## 2024-05-30 PROCEDURE — 99214 OFFICE O/P EST MOD 30 MIN: CPT | Performed by: INTERNAL MEDICINE

## 2024-05-30 RX ORDER — PHENTERMINE HYDROCHLORIDE 15 MG/1
15 CAPSULE ORAL EVERY MORNING
Qty: 30 CAPSULE | Refills: 5 | Status: SHIPPED | OUTPATIENT
Start: 2024-05-30

## 2024-05-30 NOTE — PROGRESS NOTES
[facility], [facility], [place]  [address]  [place] [place] [address]  Dept: [phone]      Patient:  Wolf Crabtree  :      1955  MRN:      [id number]    Referring Provider: Colleen Weiler, DO     Chief Complaint:     Chief Complaint   Patient presents with    Follow - Up    Weight Management     Weight check        Date of Surgery:   2019  Surgery Type:   Laparoscopic gastric bypass surgery     Subjective     Satiety:  positive  Food Intake:  <1 cup(s)  Fluid Intake:  64 oz  Protein Intake:  adeq grams  Vitamin:  Yes   Chewable bariatric  Exercise: Yes  Comorbidities:  Back pain-Improvement?  yes, Joint pain-Improvement?  yes, Diabetes-Improvement?  yes, Hypertension-Improvement?  yes, JERMAINE-Improvement?  yes and Snoring-Improvement?  yes    Objective     Vitals: /78   Pulse 70   Ht 5' 7\" (1.702 m)   Wt 217 lb 1.6 oz (98.5 kg)   SpO2 96%   BMI 34.00 kg/m²     Starting weight: 289   Current weight:   Interval weight loss: +05   Total weight loss:  -72    Last 3 Weights   24 1129 217 lb 1.6 oz (98.5 kg)   24 1502 204 lb (92.5 kg)   24 1427 204 lb (92.5 kg)       Patient Medications:    Current Outpatient Medications:     Phentermine HCl 15 MG Oral Cap, Take 1 capsule (15 mg total) by mouth every morning., Disp: 30 capsule, Rfl: 5    carbidopa-levodopa  MG Oral Tablet Dispersible, Take 1 tablet by mouth 3 (three) times daily., Disp: , Rfl:     fluticasone propionate 50 MCG/ACT Nasal Suspension, 2 sprays by Nasal route 2 (two) times daily., Disp: 16 g, Rfl: 0    gabapentin 300 MG Oral Cap, TAKE 2 CAPSULES BY MOUTH IN THE MORNING, AFTERNOON, EVENING AND BEFORE BEDTIME. TOTAL 8 CAPSULES DAILY, Disp: 630 capsule, Rfl: 1    sildenafil 20 MG Oral Tab, Take 1 tablet (20 mg total) by mouth daily as needed., Disp: , Rfl:     tamsulosin (FLOMAX) cap, Take 1 capsule (0.4 mg total) by mouth nightly. TAKE AT BEDTIME, Disp: ,  Rfl:     BUPROPION  MG Oral Tab, TAKE 1 TABLET BY MOUTH THREE TIMES DAILY, Disp: 90 tablet, Rfl: 0    Multiple Vitamins-Minerals (BARIATRIC MULTIVITAMINS/IRON OR), Take by mouth., Disp: , Rfl:     Allergies:  Pantoprazole     Social History:    Social History     Socioeconomic History    Marital status: Single   Tobacco Use    Smoking status: Never    Smokeless tobacco: Never   Vaping Use    Vaping status: Never Used   Substance and Sexual Activity    Alcohol use: Yes     Comment: Beer & Wine rarely    Drug use: Yes     Types: Cannabis   Other Topics Concern    Caffeine Concern No    Exercise Yes   Social History Narrative    Live with girlfriend    Work as [employer]     Social Determinants of Health      Received from [facility]    Housing Stability     Surgical History:    Past Surgical History:   Procedure Laterality Date    Appendectomy      Colonoscopy N/A 9/26/2018    Procedure: COLONOSCOPY;  Surgeon: HILTON Hansen MD;  Location: [facility] ENDOSCOPY    Colonoscopy      Colonoscopy N/A 3/1/2022    Procedure: COLONOSCOPY;  Surgeon: HILTON Hansen MD;  Location: [facility] ENDOSCOPY    Gastric bypass,obese<100cm bassem-en-y  01/28/2019    Dr Campbell, [facility]    Other      right big toe surgery    Tonsillectomy      Tracheostomy or larngectomy  1952    as child for croup       Family History:    Family History   Problem Relation Age of Onset    Heart Disorder Father     Diabetes Mother     Hypertension Mother     Stroke Mother         Per NG CVA    Clotting Disorder Sister     Other (Other) Paternal Grandmother         Rheumatoid Arthritis       Physical Exam:  Vital signs: Blood pressure 136/78, pulse 70, height 5' 7\" (1.702 m), weight 217 lb 1.6 oz (98.5 kg), SpO2 96%.  General appearance: alert, appears stated age and cooperative  Head: Normocephalic, without obvious abnormality, atraumatic  Eyes: conjunctivae/corneas clear. PERRL, EOM's intact. Fundi benign.  Neck: no  adenopathy, no carotid bruit, no JVD, supple, symmetrical, trachea midline and thyroid not enlarged, symmetric, no tenderness/mass/nodules  Back: symmetric, no curvature. ROM normal. No CVA tenderness.  Lungs: clear to auscultation bilaterally  Heart: S1, S2 normal, no murmur, click, rub or gallop, regular rate and rhythm  Abdomen: soft, non-tender; bowel sounds normal; no masses,  no organomegaly  Extremities: extremities normal, atraumatic, no cyanosis or edema  Pulses: 2+ and symmetric  Skin: Skin color, texture, turgor normal. No rashes or lesions       ROS:    Constitutional: positive for fatigue  Respiratory: negative  Cardiovascular: negative  Gastrointestinal: negative  Musculoskeletal:positive for arthralgias and back pain  Neurological: negative  Behavioral/Psych: negative  Endocrine: negative     Has altered sense of taste and smell    All other systems were reviewed and are negative    Assessment     HYPERTENSION:  The patient's blood pressure has been well controlled.  he has been checking it as instructed and has remained in relatively good control.    OBSTRUCTIVE SLEEP APNEA: The patient states his sleep apnea has been stable since the last clinic visit. There has not been any increase in hyper-somnolence.     Encounter Diagnosis(ses):   Encounter Diagnoses   Name Primary?    Mild protein-calorie malnutrition (HCC) Yes    H/O gastric bypass     Obesity (BMI 30-39.9)        Plan     S/P bassem en Y 01/28/2019  Doing well    Feels much better  Not as tired    DM2: has improved with weight loss    COLBY: improved and now off CPaP    HYPERTENSION: Blood pressure improved with weight loss.   Does get light headed when standing up quickly    Continue MVI    Loose skin noted with weight loss  Continues to exercise three times a week     Has ARLET  Did not tolerate zoloft due to sexual side effects    Has lost taste for many food groups.   Can still taste peanut butter and crystal light.   Careful with high calorie  foods.       PHENTERMINE: Since the patient would like to try phentermine, and is aware of the potential side effects (hypertension, palpitations, tachycardia, and anxiety), I will give Wolf Crabtree a prescription today to be used in conjunction with the above diet and exercise program. The patient will check his heart rate and blood pressure on a regular basis. He will call me if his BP goes over 140/90 or if he has palpitations or racing heart rate. He understands that I will not call in the prescription for him; he has to have an appointment to have the medication refilled.   Will start at 15 mg      Blood work done.     No orders of the defined types were placed in this encounter.          Feliciano Sen MD

## 2024-06-14 DIAGNOSIS — G25.81 RESTLESS LEG: ICD-10-CM

## 2024-06-14 RX ORDER — GABAPENTIN 300 MG/1
CAPSULE ORAL
Qty: 630 CAPSULE | Refills: 1 | OUTPATIENT
Start: 2024-06-14

## 2024-06-14 NOTE — TELEPHONE ENCOUNTER
LOV 05/16/23   NOV pt overdue for an appointment    Refill request for pt gabapentin 300 MG Oral Cap, reviewed by RN and routed to provider for review.    Request denied, pt needs to schedule a follow up appointment.

## 2024-08-07 ENCOUNTER — TELEPHONE (OUTPATIENT)
Facility: CLINIC | Age: 69
End: 2024-08-07

## 2024-08-07 NOTE — TELEPHONE ENCOUNTER
Called patient on 08/05/2024 to see if he wanted to set up vein procedure appointment. No answer, left  with callback number.

## 2024-08-07 NOTE — TELEPHONE ENCOUNTER
Called patient to see if he wanted to set up vein procedure appointment. No answer, left second  with callback number.

## 2024-08-09 NOTE — TELEPHONE ENCOUNTER
Called patient to see if he wanted to set up vein procedure appointment. No answer, left third VM.    Called Samantha (life partner) and was able to speak with her. Patient was with her so I was able to speak with him to schedule vein procedures. Patient scheduled for 01/03/2025 and 01/17/2025. Let him know he will need post procedure ultrasound scans on Monday 01/06 and 01/20. He understood. Also let him know I will be sending procedure instructions via [employer]. He understood.

## 2024-11-13 ENCOUNTER — OFFICE VISIT (OUTPATIENT)
Dept: SURGERY | Facility: CLINIC | Age: 69
End: 2024-11-13
Payer: MEDICARE

## 2024-11-13 VITALS
HEART RATE: 78 BPM | WEIGHT: 225.31 LBS | DIASTOLIC BLOOD PRESSURE: 78 MMHG | OXYGEN SATURATION: 98 % | BODY MASS INDEX: 35.36 KG/M2 | HEIGHT: 67 IN | SYSTOLIC BLOOD PRESSURE: 136 MMHG

## 2024-11-13 DIAGNOSIS — E66.9 OBESITY (BMI 30-39.9): ICD-10-CM

## 2024-11-13 DIAGNOSIS — E44.1 MILD PROTEIN-CALORIE MALNUTRITION (HCC): Primary | ICD-10-CM

## 2024-11-13 DIAGNOSIS — Z98.84 H/O GASTRIC BYPASS: ICD-10-CM

## 2024-11-13 PROCEDURE — 99214 OFFICE O/P EST MOD 30 MIN: CPT | Performed by: INTERNAL MEDICINE

## 2024-11-13 NOTE — PROGRESS NOTES
[facility], [facility], [place]  [address]  [facility] [address]  Dept: [phone]      Patient:  Wolf Crabtree  :      1955  MRN:      [id number]    Referring Provider: Colleen Weiler, DO     Chief Complaint:     Chief Complaint   Patient presents with    Follow - Up    Weight Management       Date of Surgery:   2019  Surgery Type:   Laparoscopic gastric bypass surgery     Subjective     Satiety:  positive  Food Intake:  <1 cup(s)  Fluid Intake:  64 oz  Protein Intake:  adeq grams  Vitamin:  Yes   Chewable bariatric  Exercise: Yes  Comorbidities:  Back pain-Improvement?  yes, Joint pain-Improvement?  yes, Diabetes-Improvement?  yes, Hypertension-Improvement?  yes, JERMAINE-Improvement?  yes and Snoring-Improvement?  yes    Objective     Vitals: /78   Pulse 78   Ht 5' 7\" (1.702 m)   Wt 225 lb 4.8 oz (102.2 kg)   SpO2 98%   BMI 35.29 kg/m²     Starting weight: 289   Current weight:   Interval weight loss: +08   Total weight loss:  -64    Last 3 Weights   24 1032 225 lb 4.8 oz (102.2 kg)   24 1129 217 lb 1.6 oz (98.5 kg)   24 1502 204 lb (92.5 kg)       Patient Medications:    Current Outpatient Medications:     fluticasone propionate 50 MCG/ACT Nasal Suspension, 2 sprays by Nasal route 2 (two) times daily., Disp: 16 g, Rfl: 0    gabapentin 300 MG Oral Cap, TAKE 2 CAPSULES BY MOUTH IN THE MORNING, AFTERNOON, EVENING AND BEFORE BEDTIME. TOTAL 8 CAPSULES DAILY, Disp: 630 capsule, Rfl: 1    sildenafil 20 MG Oral Tab, Take 1 tablet (20 mg total) by mouth daily as needed., Disp: , Rfl:     tamsulosin (FLOMAX) cap, Take 1 capsule (0.4 mg total) by mouth nightly. TAKE AT BEDTIME, Disp: , Rfl:     BUPROPION  MG Oral Tab, TAKE 1 TABLET BY MOUTH THREE TIMES DAILY, Disp: 90 tablet, Rfl: 0    Multiple Vitamins-Minerals (BARIATRIC MULTIVITAMINS/IRON OR), Take by mouth., Disp: , Rfl:     Phentermine HCl 15 MG Oral Cap, Take 1  capsule (15 mg total) by mouth every morning. (Patient not taking: Reported on 11/13/2024), Disp: 30 capsule, Rfl: 5    Allergies:  Pantoprazole     Social History:    Social History     Socioeconomic History    Marital status: Single   Tobacco Use    Smoking status: Never    Smokeless tobacco: Never   Vaping Use    Vaping status: Never Used   Substance and Sexual Activity    Alcohol use: Yes     Comment: Beer & Wine rarely    Drug use: Yes     Types: Cannabis   Other Topics Concern    Caffeine Concern No    Exercise Yes   Social History Narrative    Live with girlfriend    Work as [employer]     Social Drivers of Health      Received from [facility]    Housing Stability     Surgical History:    Past Surgical History:   Procedure Laterality Date    Appendectomy      Colonoscopy N/A 9/26/2018    Procedure: COLONOSCOPY;  Surgeon: HILTON Hansen MD;  Location: [facility] ENDOSCOPY    Colonoscopy      Colonoscopy N/A 3/1/2022    Procedure: COLONOSCOPY;  Surgeon: HILTON Hansen MD;  Location: [facility] ENDOSCOPY    Gastric bypass,obese<100cm bassem-en-y  01/28/2019    Dr Campbell, [facility]    Other      right big toe surgery    Tonsillectomy      Tracheostomy or larngectomy  1952    as child for croup       Family History:    Family History   Problem Relation Age of Onset    Heart Disorder Father     Diabetes Mother     Hypertension Mother     Stroke Mother         Per NG CVA    Clotting Disorder Sister     Other (Other) Paternal Grandmother         Rheumatoid Arthritis       Physical Exam:  Vital signs: Blood pressure 136/78, pulse 78, height 5' 7\" (1.702 m), weight 225 lb 4.8 oz (102.2 kg), SpO2 98%.  General appearance: alert, appears stated age and cooperative  Head: Normocephalic, without obvious abnormality, atraumatic  Eyes: conjunctivae/corneas clear. PERRL, EOM's intact. Fundi benign.  Neck: no adenopathy, no carotid bruit, no JVD, supple, symmetrical, trachea midline and thyroid not  enlarged, symmetric, no tenderness/mass/nodules  Back: symmetric, no curvature. ROM normal. No CVA tenderness.  Lungs: clear to auscultation bilaterally  Heart: S1, S2 normal, no murmur, click, rub or gallop, regular rate and rhythm  Abdomen: soft, non-tender; bowel sounds normal; no masses,  no organomegaly  Extremities: extremities normal, atraumatic, no cyanosis or edema  Pulses: 2+ and symmetric  Skin: Skin color, texture, turgor normal. No rashes or lesions       ROS:    Constitutional: positive for fatigue  Respiratory: negative  Cardiovascular: negative  Gastrointestinal: negative  Musculoskeletal:positive for arthralgias and back pain  Neurological: negative  Behavioral/Psych: negative  Endocrine: negative     Has altered sense of taste and smell    All other systems were reviewed and are negative    Assessment     HYPERTENSION:  The patient's blood pressure has been well controlled.  he has been checking it as instructed and has remained in relatively good control.    OBSTRUCTIVE SLEEP APNEA: The patient states his sleep apnea has been stable since the last clinic visit. There has not been any increase in hyper-somnolence.     Encounter Diagnosis(ses):   Encounter Diagnoses   Name Primary?    Mild protein-calorie malnutrition (HCC) Yes    H/O gastric bypass     Obesity (BMI 30-39.9)        Plan     S/P bassem en Y 01/28/2019  Doing well    Feels much better  Not as tired    DM2: has improved with weight loss    COLBY: improved and now off CPaP    HYPERTENSION: Blood pressure improved with weight loss.   Does get light headed when standing up quickly    Continue MVI    Loose skin noted with weight loss  Continues to exercise three times a week     Has ARLET  Did not tolerate zoloft due to sexual side effects    Has lost taste for many food groups.   Can still taste peanut butter and crystal light.   Careful with high calorie foods.       PHENTERMINE: did not tolerate    Will start Semiglutide    Compound  semaglutide is a custom-made medication that mimics the GLP-1 hormone. It is used to treat type 2 diabetes and lower the risk of heart or blood vessel disease. It works by increasing insulin release, lowering glucagon release, delaying gastric emptying and reducing appetite. Compound semaglutide is prescribed when an FDA-approved medication, dose, or dosage form is unavailable (ie. Nationwide shortage or no obesity coverage for GLP-1 meds).     Cost of these medications is  dollars monthly based on dose.        Blood work done.     No orders of the defined types were placed in this encounter.          Feliciano Sen MD

## 2025-01-17 ENCOUNTER — OFFICE VISIT (OUTPATIENT)
Facility: CLINIC | Age: 70
End: 2025-01-17
Payer: MEDICARE

## 2025-01-17 DIAGNOSIS — I83.813 VARICOSE VEINS OF BILATERAL LOWER EXTREMITIES WITH PAIN: Primary | ICD-10-CM

## 2025-01-17 NOTE — PROCEDURES
Samer F. Najjar, MD  Vascular Surgery  [facility]         Endovenous Radio Frequency Ablation (RFA) Therapy Operative Report    PATIENT:  Wolf Crabtree   : 1955     SURGEON: Samer F Najjar, MD     Pre-Op Diagnosis: Symptomatic Varicose Veins     Post-Op Diagnosis: Same    Date of Procedure: 2025     Procedure: Radio Frequency Endovascular ablation of the Right  Greater Saphenous Vein     The skin of the leg was prepped in the usual sterile fashion.  Ultrasound guidance was used throughout the procedure with a Diandra iu22/Operatixan.  The gretaer saphenous vein was cannulated using a micropuncture system. A 7F sheath was placed advanced over the wire.  The RFA catheter fiber was passed through the sheath and placed to the appropriate position but at least 5 centimeters from the deep vein.  Using tumescent anesthesia the entire sheathed portion of the vein was anesthetized assuring that there was at least 1 cm between the vein and the skin surface.  The entire length of the vein was treated using 6 cycles of the RFA.  Duplex imaging confirmed closure of the accessory saphenous vein with no evidence for DVT.    Steri strips and sterile dressing were applied. The patient tolerated the procedure well.    Cannulation Site: calf      Treatment Length: 48 cm   Cycles: 8     Treatment Time: 2 min:40 sec      Surgeon Signature: Samer F Najjar, MD

## 2025-01-20 ENCOUNTER — NURSE ONLY (OUTPATIENT)
Facility: CLINIC | Age: 70
End: 2025-01-20
Payer: MEDICARE

## 2025-01-20 DIAGNOSIS — I83.813 VARICOSE VEINS OF BOTH LOWER EXTREMITIES WITH PAIN: ICD-10-CM

## 2025-02-10 ENCOUNTER — OFFICE VISIT (OUTPATIENT)
Facility: CLINIC | Age: 70
End: 2025-02-10
Payer: MEDICARE

## 2025-02-10 DIAGNOSIS — I83.813 VARICOSE VEINS OF BOTH LOWER EXTREMITIES WITH PAIN: Primary | ICD-10-CM

## 2025-02-10 NOTE — PROCEDURES
Samer F. Najjar, MD  Vascular Surgery  [facility]         Endovenous Radio Frequency Ablation (RFA) Therapy Operative Report    PATIENT:  Wolf Crabtree   : 1955     SURGEON: Samer F Najjar, MD     Pre-Op Diagnosis: Symptomatic Varicose Veins     Post-Op Diagnosis: Same    Date of Procedure: 2/10/2025     Procedure: Radio Frequency Endovascular ablation of the Left  Greater Saphenous Vein     The skin of the leg was prepped in the usual sterile fashion.  Ultrasound guidance was used throughout the procedure with a Diandra iu22/what3wordsan.  The gretaer saphenous vein was cannulated using a micropuncture system. A 7F sheath was placed advanced over the wire.  The RFA catheter fiber was passed through the sheath and placed to the appropriate position but at least 5 centimeters from the deep vein.  Using tumescent anesthesia the entire sheathed portion of the vein was anesthetized assuring that there was at least 1 cm between the vein and the skin surface.  The entire length of the vein was treated using 6 cycles of the RFA.  Duplex imaging confirmed closure of the accessory saphenous vein with no evidence for DVT.    Steri strips and sterile dressing were applied. The patient tolerated the procedure well.    Cannulation Site: calf      Treatment Length: 55 cm   Cycles: 8     Treatment Time: 2 min : 40 sec      Surgeon Signature: Samer F Najjar, MD

## 2025-02-13 ENCOUNTER — NURSE ONLY (OUTPATIENT)
Facility: CLINIC | Age: 70
End: 2025-02-13
Payer: MEDICARE

## 2025-02-13 DIAGNOSIS — I83.813 VARICOSE VEINS OF BOTH LOWER EXTREMITIES WITH PAIN: ICD-10-CM

## 2025-02-13 PROCEDURE — 93971 EXTREMITY STUDY: CPT | Performed by: SURGERY

## 2025-03-14 ENCOUNTER — OFFICE VISIT (OUTPATIENT)
Facility: CLINIC | Age: 70
End: 2025-03-14
Payer: MEDICARE

## 2025-03-14 VITALS — SYSTOLIC BLOOD PRESSURE: 124 MMHG | BODY MASS INDEX: 34 KG/M2 | DIASTOLIC BLOOD PRESSURE: 74 MMHG | WEIGHT: 217 LBS

## 2025-03-14 DIAGNOSIS — I83.813 VARICOSE VEINS OF BILATERAL LOWER EXTREMITIES WITH PAIN: Primary | ICD-10-CM

## 2025-03-14 PROCEDURE — 99212 OFFICE O/P EST SF 10 MIN: CPT

## 2025-03-14 NOTE — PROGRESS NOTES
VASCULAR SURGERY OFFICE NOTE    3/14/2025    Name: Wolf Crabtree   : 1955  [id number]     REASON FOR VISIT:   Patient is a 69 year old male who is here for his follow up visit for RFA of the  bilateral GSV (Right GSV 25, Left GSV 2/10/25). Has done well at home.  He reports improving pain. Denies any new erythema at the access sites. The postop ultrasounds revealed no deep venous thrombosis with occlusion of the treated veins.   The patient has been wearing his compression stockings as instructed.    PAST MEDICAL HISTORY:     Past Medical History:    ADHD (attention deficit hyperactivity disorder)    Anxiety state    BPH (benign prostatic hyperplasia)    BPH (benign prostatic hypertrophy)    Childhood asthma (HCC)    Depression    Dissociative identity disorder (HCC)    DVT (deep venous thrombosis) (HCC)    right leg    Esophageal reflux    H/O gastric bypass    for weight loss    H/O tracheostomy    Age 2. 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    History of blood transfusion    45 yrs ago    Hypertension    Lipid screening    Marginal ulcer    COLBY on CPAP    Dr. Aaron Lucas    Sleep apnea    CPAP    Tubular adenoma of colon    repeat CLN in 5 years    Visual impairment    eyeglasses       PAST SURGICAL HISTORY:     Past Surgical History:   Procedure Laterality Date    Appendectomy      Colonoscopy N/A 2018    Procedure: COLONOSCOPY;  Surgeon: HILTON Hansen MD;  Location: [facility] ENDOSCOPY    Colonoscopy      Colonoscopy N/A 3/1/2022    Procedure: COLONOSCOPY;  Surgeon: HILTON Hansen MD;  Location: [facility] ENDOSCOPY    Gastric bypass,obese<100cm bassem-en-y  2019    Dr Campbell, [facility]    Other      right big toe surgery    Tonsillectomy      Tracheostomy or larngectomy      as child for croup        MEDICATIONS:     Current Outpatient Medications   Medication Sig Dispense Refill    CUSTOM MEDICATION Semaglutide/ cyanocobalamin    0.25 mg-   concentration: 1 /0.5 mg/  ML  Amount:  1 Ml vial  Instructions: inject 25 units/ 0.25 ML q weekly 1 each 5    fluticasone propionate 50 MCG/ACT Nasal Suspension 2 sprays by Nasal route 2 (two) times daily. 16 g 0    gabapentin 300 MG Oral Cap TAKE 2 CAPSULES BY MOUTH IN THE MORNING, AFTERNOON, EVENING AND BEFORE BEDTIME. TOTAL 8 CAPSULES DAILY 630 capsule 1    sildenafil 20 MG Oral Tab Take 1 tablet (20 mg total) by mouth daily as needed.      tamsulosin (FLOMAX) cap Take 1 capsule (0.4 mg total) by mouth nightly. TAKE AT BEDTIME      BUPROPION  MG Oral Tab TAKE 1 TABLET BY MOUTH THREE TIMES DAILY 90 tablet 0    Multiple Vitamins-Minerals (BARIATRIC MULTIVITAMINS/IRON OR) Take by mouth.         EXAM:   /74 (BP Location: Radial)   Wt 217 lb (98.4 kg)   BMI 33.99 kg/m²   The  bilateral calf access site has healed well with no erythema.       ASSESSMENT AND PLAN:   Successful treatment of the venous insufficiency with no complications.   The patient is very satisfied and will see me on an as needed basis.    NOEL Bowman  Division of Vascular Surgery.

## 2025-03-14 NOTE — PATIENT INSTRUCTIONS
MEDICAL GRADE COMPRESSION VENDORS:  The list below are different vendors who have compression stockings available  Please call and make an appointment  Some of the following fitted clinics accept insurance or Self Pay:                 Patients with HMO insurance needs one primary physician referral      [facility]                                          5.   [facility]                                       SELF PAY = $106-$170                                                                       SELF PAY (price varies)                        [facility]                                                                                 [address]                        [place], [place]         [facility]                                                                                             Phone # [phone] [address]                                                          Fax # [phone]                        [place], [place]                                                                                               Phone # [phone]                                                                                 Fax # [phone]              [employer]                                                  6.       [facility]   SELF PAY = $85-$150                                                                          SELF PAY (price varies)                        Some Insurance Plans over $200                                                 [address]                                                               Tel# [phone]                         Phone # [phone]                                                                     Fax # [phone]                         [address]                                                                                                [place], [place]                                                                                   [employer]                         Phone # [phone]                                                                     [place]                         Fax#[phone]                                                                              [employer]                                                                                                                                                                                                                                                                                     Medicare A-D    [facility]                                               7.  [facility]        SELF PAY = $32.99-$120                                           SELF PAY = [address]        No insurance plans accepted                                                        [place] [address]        [address]                                                                                  Phone # [phone]        [place], [place]                                                                                Fax # [phone]        Phone # [phone]; Fax # [phone]         Technician: [facility]        SELF PAY = $50-$156        No insurance plans accepted        No Custom compressions        2 pairs every 6 months (Lymphedema)                       [address] [place], [place]         Phone # [phone] Ext # 5; Fax # [phone]        Technician: Arlette

## 2025-04-30 ENCOUNTER — HOSPITAL ENCOUNTER (OUTPATIENT)
Dept: GENERAL RADIOLOGY | Age: 70
Discharge: HOME | End: 2025-04-30
Attending: FAMILY MEDICINE
Payer: MEDICARE

## 2025-04-30 ENCOUNTER — OFFICE VISIT (OUTPATIENT)
Dept: FAMILY MEDICINE CLINIC | Facility: CLINIC | Age: 70
End: 2025-04-30
Payer: MEDICARE

## 2025-04-30 ENCOUNTER — LAB ENCOUNTER (OUTPATIENT)
Dept: LAB | Age: 70
End: 2025-04-30
Attending: FAMILY MEDICINE
Payer: MEDICARE

## 2025-04-30 VITALS
DIASTOLIC BLOOD PRESSURE: 76 MMHG | WEIGHT: 210 LBS | HEART RATE: 69 BPM | HEIGHT: 67 IN | TEMPERATURE: 98 F | RESPIRATION RATE: 16 BRPM | BODY MASS INDEX: 32.96 KG/M2 | SYSTOLIC BLOOD PRESSURE: 124 MMHG

## 2025-04-30 DIAGNOSIS — R05.1 ACUTE COUGH: ICD-10-CM

## 2025-04-30 DIAGNOSIS — Z98.84 H/O GASTRIC BYPASS: ICD-10-CM

## 2025-04-30 DIAGNOSIS — E66.9 OBESITY (BMI 30-39.9): ICD-10-CM

## 2025-04-30 DIAGNOSIS — G20.A1 PARKINSON'S DISEASE WITHOUT DYSKINESIA OR FLUCTUATING MANIFESTATIONS (HCC): ICD-10-CM

## 2025-04-30 DIAGNOSIS — I83.90 VARICOSE VEINS: ICD-10-CM

## 2025-04-30 DIAGNOSIS — E44.1 MILD PROTEIN-CALORIE MALNUTRITION (HCC): ICD-10-CM

## 2025-04-30 DIAGNOSIS — F41.9 ANXIETY: ICD-10-CM

## 2025-04-30 DIAGNOSIS — E55.9 VITAMIN D DEFICIENCY, UNSPECIFIED: ICD-10-CM

## 2025-04-30 DIAGNOSIS — Z00.00 ENCOUNTER FOR ANNUAL WELLNESS EXAM IN MEDICARE PATIENT: Primary | ICD-10-CM

## 2025-04-30 DIAGNOSIS — Z00.00 ENCOUNTER FOR ANNUAL WELLNESS EXAM IN MEDICARE PATIENT: ICD-10-CM

## 2025-04-30 LAB
ALBUMIN SERPL-MCNC: 4.3 G/DL (ref 3.2–4.8)
ALBUMIN/GLOB SERPL: 1.5 {RATIO} (ref 1–2)
ALP LIVER SERPL-CCNC: 77 U/L (ref 45–117)
ALT SERPL-CCNC: 22 U/L (ref 10–49)
ANION GAP SERPL CALC-SCNC: 6 MMOL/L (ref 0–18)
AST SERPL-CCNC: 25 U/L (ref ?–34)
BILIRUB SERPL-MCNC: 0.7 MG/DL (ref 0.2–1.1)
BUN BLD-MCNC: 15 MG/DL (ref 9–23)
BUN/CREAT SERPL: 12.3 (ref 10–20)
CALCIUM BLD-MCNC: 8.7 MG/DL (ref 8.7–10.4)
CHLORIDE SERPL-SCNC: 103 MMOL/L (ref 98–112)
CHOLEST SERPL-MCNC: 133 MG/DL (ref ?–200)
CO2 SERPL-SCNC: 30 MMOL/L (ref 21–32)
CREAT BLD-MCNC: 1.22 MG/DL (ref 0.7–1.3)
DEPRECATED RDW RBC AUTO: 43.4 FL (ref 35.1–46.3)
EGFRCR SERPLBLD CKD-EPI 2021: 64 ML/MIN/1.73M2 (ref 60–?)
ERYTHROCYTE [DISTWIDTH] IN BLOOD BY AUTOMATED COUNT: 13.6 % (ref 11–15)
FASTING PATIENT LIPID ANSWER: NO
FASTING STATUS PATIENT QL REPORTED: NO
GLOBULIN PLAS-MCNC: 2.8 G/DL (ref 2–3.5)
GLUCOSE BLD-MCNC: 72 MG/DL (ref 70–99)
HCT VFR BLD AUTO: 45.4 % (ref 39–53)
HDLC SERPL-MCNC: 43 MG/DL (ref 40–59)
HGB BLD-MCNC: 15.1 G/DL (ref 13–17.5)
LDLC SERPL CALC-MCNC: 76 MG/DL (ref ?–100)
MCH RBC QN AUTO: 29 PG (ref 26–34)
MCHC RBC AUTO-ENTMCNC: 33.3 G/DL (ref 31–37)
MCV RBC AUTO: 87.1 FL (ref 80–100)
NONHDLC SERPL-MCNC: 90 MG/DL (ref ?–130)
OSMOLALITY SERPL CALC.SUM OF ELEC: 287 MOSM/KG (ref 275–295)
PLATELET # BLD AUTO: 123 10(3)UL (ref 150–450)
PLATELETS.RETICULATED NFR BLD AUTO: 4.5 % (ref 0–7)
POTASSIUM SERPL-SCNC: 3.8 MMOL/L (ref 3.5–5.1)
PROT SERPL-MCNC: 7.1 G/DL (ref 5.7–8.2)
RBC # BLD AUTO: 5.21 X10(6)UL (ref 3.8–5.8)
SODIUM SERPL-SCNC: 139 MMOL/L (ref 136–145)
TRIGL SERPL-MCNC: 69 MG/DL (ref 30–149)
TSI SER-ACNC: 2.34 UIU/ML (ref 0.55–4.78)
VIT B12 SERPL-MCNC: 963 PG/ML (ref 211–911)
VIT D+METAB SERPL-MCNC: 37.2 NG/ML (ref 30–100)
VLDLC SERPL CALC-MCNC: 11 MG/DL (ref 0–30)
WBC # BLD AUTO: 6.5 X10(3) UL (ref 4–11)

## 2025-04-30 PROCEDURE — 80061 LIPID PANEL: CPT

## 2025-04-30 PROCEDURE — 82607 VITAMIN B-12: CPT

## 2025-04-30 PROCEDURE — 80053 COMPREHEN METABOLIC PANEL: CPT

## 2025-04-30 PROCEDURE — 36415 COLL VENOUS BLD VENIPUNCTURE: CPT

## 2025-04-30 PROCEDURE — 85027 COMPLETE CBC AUTOMATED: CPT

## 2025-04-30 PROCEDURE — 84425 ASSAY OF VITAMIN B-1: CPT

## 2025-04-30 PROCEDURE — 84443 ASSAY THYROID STIM HORMONE: CPT

## 2025-04-30 PROCEDURE — 71046 X-RAY EXAM CHEST 2 VIEWS: CPT | Performed by: FAMILY MEDICINE

## 2025-04-30 PROCEDURE — 82306 VITAMIN D 25 HYDROXY: CPT

## 2025-04-30 NOTE — PROGRESS NOTES
Subjective:   Wolf Crabtree is a 69 year old male who presents for a Medicare Subsequent Annual Wellness visit (Pt already had Initial Annual Wellness) and scheduled follow up of multiple significant but stable problems.   History of Present Illness      HPI:  69 yr old male who presents for Medicare physical. Retired .  Has 2 children- son and daughter. Walking regularly and boxes. Eating healthy. Going to [place] in June.     Had cold recently and developed into chest congestion.  Has cough and some shortness of breath. Fatigued. Has productive cough. No fever.     Diagnosed with Parkinson's.  ?diagnosis. Sees Neurologist. Stopped Carvidopa. Has occasional hand shaking.  On Gabapentin for restless legs.     Has mild sleep apnea. Stopped using CPAP. Not having any increased symptoms.     Follows with Dr. Sen. Had bowel perforation in 9/22.     Sees Psychiatry weekly for DID, ANNMARIE and major depressive disorder.  Controlled. Using Semaglutide.  Lost 15 pounds. Sugar craving has gone away.     On Flomax.  Sees Urologist every 6 months.     Wearing compression hose for varicose veins.     Wears glasses and contacts.   History/Other:   Fall Risk Assessment:   He has been screened for Falls and is low risk.      Cognitive Assessment:   He had a completely normal cognitive assessment - see flowsheet entries     Functional Ability/Status:   Wolf Crabtree has some abnormal functions as listed below:  He has Dressing and/or Bathing issues based on screening of functional status.  Difficulty dressing or bathing?: (Patient-Rptd) No  Bathing or Showering: (Patient-Rptd) Cannot do without help  Dressing: (Patient-Rptd) Cannot do without help  He has Toileting difficulties based on screening of functional status.He has Eating difficulties based on screening of functional status.He has Driving difficulties based on screening of functional status. He has Meal Preparation difficulties based on screening of  functional status.He has difficulties Managing Money/Bills based on screening of functional status.He has difficulties Shopping for Groceries based on screening of functional status. He has difficulties Taking Meds as Rx'd based on screening of functional status. He has Hearing problems based on screening of functional status.      Depression Screening (PHQ):  PHQ-2 SCORE: 0  , done 4/30/2025             Advanced Directives:   He does NOT have a Living Will. [Do you have a living will?: (Patient-Rptd) Yes]  He does NOT have a Power of  for Health Care. [Do you have a healthcare power of ?: (Patient-Rptd) Yes]  Discussed Advance Care Planning with patient (and family/surrogate if present). Standard forms made available to patient in After Visit Summary.      Patient Active Problem List   Diagnosis    Anxiety    Obesity (BMI 30-39.9)    H/O gastric bypass    Mild protein-calorie malnutrition (HCC)    Parkinson's disease (HCC)     Allergies:  He is allergic to pantoprazole.    Current Medications:  Outpatient Medications Marked as Taking for the 4/30/25 encounter (Office Visit) with Weiler, Colleen M, DO   Medication Sig    [DISCONTINUED] CUSTOM MEDICATION Semaglutide/ cyanocobalamin    0.25 mg-   concentration: 1 /0.5 mg/ ML  Amount:  1 Ml vial  Instructions: inject 25 units/ 0.25 ML q weekly    fluticasone propionate 50 MCG/ACT Nasal Suspension 2 sprays by Nasal route 2 (two) times daily.    gabapentin 300 MG Oral Cap TAKE 2 CAPSULES BY MOUTH IN THE MORNING, AFTERNOON, EVENING AND BEFORE BEDTIME. TOTAL 8 CAPSULES DAILY    sildenafil 20 MG Oral Tab Take 1 tablet (20 mg total) by mouth daily as needed.    tamsulosin (FLOMAX) cap Take 1 capsule (0.4 mg total) by mouth nightly. TAKE AT BEDTIME    BUPROPION  MG Oral Tab TAKE 1 TABLET BY MOUTH THREE TIMES DAILY    Multiple Vitamins-Minerals (BARIATRIC MULTIVITAMINS/IRON OR) Take by mouth.       Medical History:  He  has a past medical history of ADHD  (attention deficit hyperactivity disorder), Anxiety state, BPH (benign prostatic hyperplasia), BPH (benign prostatic hypertrophy), Childhood asthma (HCC), Depression, Dissociative identity disorder (HCC), DVT (deep venous thrombosis) (HCC), Esophageal reflux, H/O gastric bypass (01/28/2019), H/O tracheostomy, History of blood transfusion, Hypertension, Lipid screening (6/21/13), Marginal ulcer (9/21/2022), COLBY on CPAP, Sleep apnea, Tubular adenoma of colon (2022), and Visual impairment.  Surgical History:  He  has a past surgical history that includes tonsillectomy; appendectomy; tracheostomy or larngectomy (1952); colonoscopy (N/A, 9/26/2018); colonoscopy; other; gastric bypass,obese<100cm bassem-en-y (01/28/2019); and colonoscopy (N/A, 3/1/2022).   Family History:  His family history includes Clotting Disorder in his sister; Diabetes in his mother; Heart Disorder in his father; Hypertension in his mother; Other in his paternal grandmother; Stroke in his mother.  Social History:  He  reports that he has never smoked. He has never used smokeless tobacco. He reports current alcohol use. He reports current drug use. Drug: Cannabis.    Tobacco:  He has never smoked tobacco.    CAGE Alcohol Screen:   CAGE screening score of 0 on 4/29/2025, showing low risk of alcohol abuse.      Patient Care Team:  Weiler, Colleen M, DO as PCP - General (Family Medicine)  Bud Retana DO (NEUROLOGY)  Stephani Gibson PT as Physical Therapist (Physical Therapy)  Saad Huerta SLP as Speech Language Pathologist (Speech-Language Pathologist)  Najjar, Samer F, MD (SURGERY, VASCULAR)    Review of Systems  ROS:   Allergic/Immuno:  Negative for environmental allergies and food allergies  Cardiovascular:  Negative for chest pain and irregular heartbeat/palpitations  Constitutional:  Negative for decreased activity, fever, irritability and lethargy  Endocrine:  Negative for abnormal sleep patterns, increased activity, polydipsia and  polyphagia  ENMT:  Negative for ear drainage, hearing loss and nasal drainage  Eyes:  Negative for eye discharge and vision loss  Gastrointestinal:  Negative for abdominal pain, constipation, decreased appetite, diarrhea and vomiting  Genitourinary:  Negative for dysuria and hematuria  Hema/Lymph:  Negative for easy bleeding and easy bruising  Integumentary:  Negative for pruritus and rash  Musculoskeletal:  Negative for bone/joint symptoms  Neurological:  Negative for gait disturbance  Psychiatric:  Negative for inappropriate interaction and psychiatric symptoms      Objective:   Physical Exam     /76   Pulse 69   Temp 97.6 °F (36.4 °C) (Temporal)   Resp 16   Ht 5' 7\" (1.702 m)   Wt 210 lb (95.3 kg)   BMI 32.89 kg/m²  Estimated body mass index is 32.89 kg/m² as calculated from the following:    Height as of this encounter: 5' 7\" (1.702 m).    Weight as of this encounter: 210 lb (95.3 kg).    Medicare Hearing Assessment:   Hearing Screening    Screening Method: Other  Other: hearing aids         Visual Acuity:   Right Eye Visual Acuity: Corrected Right Eye Chart Acuity: 20/20   Left Eye Visual Acuity: Corrected Left Eye Chart Acuity: 20/20   Both Eyes Visual Acuity: Corrected Both Eyes Chart Acuity: 20/20          Gen:  Well-nourished.  No distress.  HEENT: Conjunctive clear.  Kashif ear canals clear.  Kashif TMs intact with good landmarks noted.  Nares patent.  Oral mucous membrane moist.  Normal lips, teeth, and gums.  Oropharynx normal.  Neck supple.  Good ROM.  No LAD.  Thyroid normal.  CV:  Regular rate and rhythm; no murmurs  Lungs:  Clear to ausculation; good aeration               No wheezes, rales or rhonchi  Abd: soft, non-tender, non-distended          Normal bowel sounds; no masses          No hepatosplenomegaly  Extremities: No cyanosis, clubbing, edema.  Pedal pulses 2+ kashif.  MSK:  No abnormalities.        Assessment & Plan:   Wolf Crabtree is a 69 year old male who presents for a Medicare  Assessment.     1. Encounter for annual wellness exam in Medicare patient (Primary)    Active. Labs done.   -     CBC, Platelet; No Differential; Future; Expected date: 04/30/2025  -     Comp Metabolic Panel (14); Future; Expected date: 04/30/2025  -     Lipid Panel; Future; Expected date: 04/30/2025  -     TSH W Reflex To Free T4; Future; Expected date: 04/30/2025    2. Acute cough    Ongoing.  Will check CXR  -     XR CHEST PA + LAT CHEST (CPT=71046); Future; Expected date: 04/30/2025    3. H/O gastric bypass    Follows with Bariatrics.   Overview:  for weight loss  Orders:  -     Vitamin D; Future; Expected date: 04/30/2025  -     Vitamin B1 (Thiamine), Blood; Future; Expected date: 04/30/2025  -     Vitamin B12; Future; Expected date: 04/30/2025    4. Vitamin D deficiency, unspecified    Check Vitamin D level  -     Vitamin D; Future; Expected date: 04/30/2025    5. Parkinson's disease without dyskinesia or fluctuating manifestations (HCC)    Unclear on diagnosis. Following with Neurology    6. Mild protein-calorie malnutrition (HCC)    Encouraged healthy diet    7. Obesity (BMI 30-39.9)    Follows with Bariatrics.     8. Anxiety    Stable.  Follows with Psychiatry.     9. Varicose veins     Wears compression stockings.   Assessment & Plan      The patient indicates understanding of these issues and agrees to the plan.  Reinforced healthy diet, lifestyle, and exercise.      Return in 1 year (on 4/30/2026).     Colleen Weiler, DO, 6/2/2025     Supplementary Documentation:   General Health:  In the past six months, have you lost more than 10 pounds without trying?: (Patient-Rptd) 2 - No  Has your appetite been poor?: (Patient-Rptd) No  Type of Diet: (Patient-Rptd) Other  How does the patient maintain a good energy level?: (Patient-Rptd) Appropriate Exercise  How would you describe your daily physical activity?: (Patient-Rptd) Light  How would you describe your current health state?: (Patient-Rptd) Good  How do you  maintain positive mental well-being?: (Patient-Rptd) Social Interaction, Visiting Friends, Visiting Family  On a scale of 0 to 10, with 0 being no pain and 10 being severe pain, what is your pain level?: (Patient-Rptd) 2 - (Mild)  In the past six months, have you experienced urine leakage?: (Patient-Rptd) 0-No  At any time do you feel concerned for the safety/well-being of yourself and/or your children, in your home or elsewhere?: (Patient-Rptd) No  Have you had any immunizations at another office such as Influenza, Hepatitis B, Tetanus, or Pneumococcal?: (Patient-Rptd) Yes    Health Maintenance   Topic Date Due    PSA  03/12/2014    COVID-19 Vaccine (6 - 2024-25 season) 09/01/2024    Annual Physical  03/11/2025    Influenza Vaccine (Season Ended) 10/01/2025    Colorectal Cancer Screening  03/01/2027    Annual Depression Screening  Completed    Fall Risk Screening (Annual)  Completed    Pneumococcal Vaccine: 50+ Years  Completed    Zoster Vaccines  Completed    Meningococcal B Vaccine  Aged Out

## 2025-05-05 LAB — VITAMIN B1 WHOLE BLD: 133.5 NMOL/L

## 2025-05-21 ENCOUNTER — LAB ENCOUNTER (OUTPATIENT)
Dept: LAB | Facility: HOSPITAL | Age: 70
End: 2025-05-21
Attending: INTERNAL MEDICINE
Payer: MEDICARE

## 2025-05-21 ENCOUNTER — OFFICE VISIT (OUTPATIENT)
Dept: SURGERY | Facility: CLINIC | Age: 70
End: 2025-05-21
Payer: MEDICARE

## 2025-05-21 VITALS
SYSTOLIC BLOOD PRESSURE: 130 MMHG | RESPIRATION RATE: 16 BRPM | OXYGEN SATURATION: 97 % | HEART RATE: 96 BPM | WEIGHT: 207 LBS | HEIGHT: 67 IN | DIASTOLIC BLOOD PRESSURE: 80 MMHG | BODY MASS INDEX: 32.49 KG/M2

## 2025-05-21 DIAGNOSIS — E44.1 MILD PROTEIN-CALORIE MALNUTRITION (HCC): ICD-10-CM

## 2025-05-21 DIAGNOSIS — R73.09 ABNORMAL BLOOD SUGAR: ICD-10-CM

## 2025-05-21 DIAGNOSIS — Z98.84 H/O GASTRIC BYPASS: ICD-10-CM

## 2025-05-21 DIAGNOSIS — E44.1 MILD PROTEIN-CALORIE MALNUTRITION (HCC): Primary | ICD-10-CM

## 2025-05-21 DIAGNOSIS — E11.9 TYPE 2 DIABETES MELLITUS WITHOUT COMPLICATION, WITHOUT LONG-TERM CURRENT USE OF INSULIN (HCC): ICD-10-CM

## 2025-05-21 DIAGNOSIS — E66.9 OBESITY (BMI 30-39.9): ICD-10-CM

## 2025-05-21 LAB
EST. AVERAGE GLUCOSE BLD GHB EST-MCNC: 97 MG/DL (ref 68–126)
HBA1C MFR BLD: 5 % (ref ?–5.7)

## 2025-05-21 PROCEDURE — 83036 HEMOGLOBIN GLYCOSYLATED A1C: CPT

## 2025-05-21 PROCEDURE — 36415 COLL VENOUS BLD VENIPUNCTURE: CPT

## 2025-05-21 PROCEDURE — 99214 OFFICE O/P EST MOD 30 MIN: CPT | Performed by: INTERNAL MEDICINE

## 2025-05-21 NOTE — PROGRESS NOTES
[facility]  [address]  [facility] [address]  Dept: [phone]      Patient:  Wolf Crabtree  :      1955  MRN:      [id number]    Referring Provider: Colleen Weiler, DO     Chief Complaint:     Chief Complaint   Patient presents with    Follow - Up    Weight Management       Date of Surgery:   2019  Surgery Type:   Laparoscopic gastric bypass surgery     Subjective     Satiety:  positive  Food Intake:  <1 cup(s)  Fluid Intake:  64 oz  Protein Intake:  adeq grams  Vitamin:  Yes   Chewable bariatric  Exercise: Yes  Comorbidities:  Back pain-Improvement?  yes, Joint pain-Improvement?  yes, Diabetes-Improvement?  yes, Hypertension-Improvement?  yes, JERMAINE-Improvement?  yes and Snoring-Improvement?  yes    Objective     Vitals: /80   Pulse 96   Resp 16   Ht 5' 7\" (1.702 m)   Wt 207 lb (93.9 kg)   SpO2 97%   BMI 32.42 kg/m²     Starting weight: 289   Current weight:   Interval weight loss: -18   Total weight loss:  -82    Last 3 Weights   25 1141 207 lb (93.9 kg)   25 1034 210 lb (95.3 kg)   25 0952 217 lb (98.4 kg)       Patient Medications:    Current Outpatient Medications:     CUSTOM MEDICATION, Semaglutide/ cyanocobalamin  0.25 mg-   concentration: 1 /0.5 mg/ ML Amount:  1 Ml vial Instructions: inject 25 units/ 0.25 ML q weekly, Disp: 1 each, Rfl: 5    fluticasone propionate 50 MCG/ACT Nasal Suspension, 2 sprays by Nasal route 2 (two) times daily., Disp: 16 g, Rfl: 0    gabapentin 300 MG Oral Cap, TAKE 2 CAPSULES BY MOUTH IN THE MORNING, AFTERNOON, EVENING AND BEFORE BEDTIME. TOTAL 8 CAPSULES DAILY, Disp: 630 capsule, Rfl: 1    sildenafil 20 MG Oral Tab, Take 1 tablet (20 mg total) by mouth daily as needed., Disp: , Rfl:     tamsulosin (FLOMAX) cap, Take 1 capsule (0.4 mg total) by mouth nightly. TAKE AT BEDTIME, Disp: , Rfl:     BUPROPION  MG Oral Tab, TAKE 1 TABLET BY MOUTH THREE TIMES  DAILY, Disp: 90 tablet, Rfl: 0    Multiple Vitamins-Minerals (BARIATRIC MULTIVITAMINS/IRON OR), Take by mouth., Disp: , Rfl:     Allergies:  Pantoprazole     Social History:    Social History     Socioeconomic History    Marital status: Single   Tobacco Use    Smoking status: Never    Smokeless tobacco: Never   Vaping Use    Vaping status: Never Used   Substance and Sexual Activity    Alcohol use: Yes     Comment: Beer & Wine rarely    Drug use: Yes     Types: Cannabis     Comment: gummies   Other Topics Concern    Caffeine Concern No    Exercise Yes   Social History Narrative    Live with girlfriend    Work as [employer]     Social Drivers of Health     Food Insecurity: No Food Insecurity (4/30/2025)    NCSS - Food Insecurity     Worried About Running Out of Food in the Last Year: No     Ran Out of Food in the Last Year: No   Transportation Needs: No Transportation Needs (4/30/2025)    NCSS - Transportation     Lack of Transportation: No   Housing Stability: Not At Risk (4/30/2025)    NCSS - Housing/Utilities     Has Housing: Yes     Worried About Losing Housing: No     Unable to Get Utilities: No     Surgical History:    Past Surgical History:   Procedure Laterality Date    Appendectomy      Colonoscopy N/A 9/26/2018    Procedure: COLONOSCOPY;  Surgeon: HILTON Hansen MD;  Location: [facility] ENDOSCOPY    Colonoscopy      Colonoscopy N/A 3/1/2022    Procedure: COLONOSCOPY;  Surgeon: HILTON Hansen MD;  Location: [facility] ENDOSCOPY    Gastric bypass,obese<100cm bassem-en-y  01/28/2019    Dr Campbell, [facility]    Other      right big toe surgery    Tonsillectomy      Tracheostomy or larngectomy  1952    as child for croup       Family History:    Family History   Problem Relation Age of Onset    Heart Disorder Father     Diabetes Mother     Hypertension Mother     Stroke Mother         Per NG CVA    Clotting Disorder Sister     Other (Other) Paternal Grandmother         Rheumatoid Arthritis       Physical Exam:   Vital signs: Blood pressure 130/80, pulse 96, resp. rate 16, height 5' 7\" (1.702 m), weight 207 lb (93.9 kg), SpO2 97%.  General appearance: alert, appears stated age and cooperative  Head: Normocephalic, without obvious abnormality, atraumatic  Eyes: conjunctivae/corneas clear. PERRL, EOM's intact. Fundi benign.  Neck: no adenopathy, no carotid bruit, no JVD, supple, symmetrical, trachea midline and thyroid not enlarged, symmetric, no tenderness/mass/nodules  Back: symmetric, no curvature. ROM normal. No CVA tenderness.  Lungs: clear to auscultation bilaterally  Heart: S1, S2 normal, no murmur, click, rub or gallop, regular rate and rhythm  Abdomen: soft, non-tender; bowel sounds normal; no masses,  no organomegaly  Extremities: extremities normal, atraumatic, no cyanosis or edema  Pulses: 2+ and symmetric  Skin: Skin color, texture, turgor normal. No rashes or lesions       ROS:    Constitutional: positive for fatigue  Respiratory: negative  Cardiovascular: negative  Gastrointestinal: negative  Musculoskeletal:positive for arthralgias and back pain  Neurological: negative  Behavioral/Psych: negative  Endocrine: negative     Has altered sense of taste and smell    All other systems were reviewed and are negative    Assessment     HYPERTENSION:  The patient's blood pressure has been well controlled.  he has been checking it as instructed and has remained in relatively good control.    OBSTRUCTIVE SLEEP APNEA: The patient states his sleep apnea has been stable since the last clinic visit. There has not been any increase in hyper-somnolence.     Encounter Diagnosis(ses):   Encounter Diagnoses   Name Primary?    Mild protein-calorie malnutrition (HCC) Yes    H/O gastric bypass     Obesity (BMI 30-39.9)        Plan     S/P bassem en Y 01/28/2019  Doing well    Feels much better  Not as tired    DM2: has improved with weight loss    COLBY: improved and now off CPaP    HYPERTENSION: Blood pressure improved with weight loss.    Does get light headed when standing up quickly    Continue MVI    Loose skin noted with weight loss  Continues to exercise three times a week     Has ARLET  Did not tolerate zoloft due to sexual side effects    Has lost taste for many food groups.   Can still taste peanut butter and crystal light.   Careful with high calorie foods.       PHENTERMINE: did not tolerate        Compound semaglutide   Tolerating well      Blood work done.     No orders of the defined types were placed in this encounter.          Feliciano Sen MD

## (undated) DEVICE — SNARE ENDOSCOPIC 10MM ROUND

## (undated) DEVICE — SUTURE PASSOR WITH GUIDE

## (undated) DEVICE — SUT VICRYL 2-0 SH J417H

## (undated) DEVICE — SOLUTION  .9 1000ML BTL

## (undated) DEVICE — ORVIL DEVICE 25MM

## (undated) DEVICE — LINE MNTR ADLT SET O2 INTMD

## (undated) DEVICE — CLOSURE EXOFIN 1.0ML

## (undated) DEVICE — TISSUE RETRIEVAL SYSTEM: Brand: INZII RETRIEVAL SYSTEM

## (undated) DEVICE — ENSEAL X1 TISSUE SEALER, CURVED JAW, 37 CM SHAFT LENGTH: Brand: ENSEAL

## (undated) DEVICE — ECHELON FLEX POWERED PLUS LONG ARTICULATING ENDOSCOPIC LINEAR CUTTER, 60MM: Brand: ECHELON FLEX

## (undated) DEVICE — SUTURE VLOC NONAB 2-0 6\" 0605

## (undated) DEVICE — Device: Brand: DEFENDO AIR/WATER/SUCTION AND BIOPSY VALVE

## (undated) DEVICE — SNARE OPTMZ PLPCTM TRP

## (undated) DEVICE — SUT ETHILON 3-0 FS-1 669H

## (undated) DEVICE — DRAIN SILICONE FLAT 10X20

## (undated) DEVICE — GAMMEX® PI HYBRID SIZE 7, STERILE POWDER-FREE SURGICAL GLOVE, POLYISOPRENE AND NEOPRENE BLEND: Brand: GAMMEX

## (undated) DEVICE — VIOLET BRAIDED (POLYGLACTIN 910), SYNTHETIC ABSORBABLE SUTURE: Brand: COATED VICRYL

## (undated) DEVICE — ENDOSCOPY PORT CONNECTOR FOR OLYMPUS® SCOPES: Brand: ERBE

## (undated) DEVICE — TRAP MCS 40ML 5IN PLS SCR CAP

## (undated) DEVICE — KIT ENDO ORCAPOD 160/180/190

## (undated) DEVICE — Device: Brand: JELCO

## (undated) DEVICE — HOVERMATT 34IN SINGLE USE

## (undated) DEVICE — DERMABOND LIQUID ADHESIVE

## (undated) DEVICE — SUT SILK 3-0 PS-1 1684G

## (undated) DEVICE — SUTURE PDS II 1 CT-1

## (undated) DEVICE — CLIP LGT 11MM OPEN 2.8MM 235CM

## (undated) DEVICE — SUTURE SILK 2-0 SH

## (undated) DEVICE — TROCAR: Brand: KII FIOS FIRST ENTRY

## (undated) DEVICE — ELEVIEW INJ AGENT USED FOR ESD

## (undated) DEVICE — KIT CLEAN ENDOKIT 1.1OZ GOWNX2

## (undated) DEVICE — [HIGH FLOW INSUFFLATOR,  DO NOT USE IF PACKAGE IS DAMAGED,  KEEP DRY,  KEEP AWAY FROM SUNLIGHT,  PROTECT FROM HEAT AND RADIOACTIVE SOURCES.]: Brand: PNEUMOSURE

## (undated) DEVICE — SOL  .9 1000ML BAG

## (undated) DEVICE — STAPLER EEA XL 25MM

## (undated) DEVICE — 3M™ STERI-DRAPE™ INSTRUMENT POUCH 1018L: Brand: STERI-DRAPE™

## (undated) DEVICE — PROXIMATE SKIN STAPLERS (35 WIDE) CONTAINS 35 STAINLESS STEEL STAPLES (FIXED HEAD): Brand: PROXIMATE

## (undated) DEVICE — ENDOSCOPY PACK UPPER: Brand: MEDLINE INDUSTRIES, INC.

## (undated) DEVICE — WOUND RETRACTOR AND PROTECTOR: Brand: ALEXIS WOUND PROTECTOR-RETRACTOR

## (undated) DEVICE — SOL  .9 1000ML BTL

## (undated) DEVICE — ENDOPATH ECHELON ENDOSCOPIC LINEAR CUTTER RELOADS, BLUE, 60MM: Brand: ECHELON ENDOPATH

## (undated) DEVICE — LAP CHOLE: Brand: MEDLINE INDUSTRIES, INC.

## (undated) DEVICE — TROCARS: Brand: KII® BALLOON BLUNT TIP SYSTEM

## (undated) DEVICE — UNDYED BRAIDED (POLYGLACTIN 910), SYNTHETIC ABSORBABLE SUTURE: Brand: COATED VICRYL

## (undated) DEVICE — 35 ML SYRINGE REGULAR TIP: Brand: MONOJECT

## (undated) DEVICE — SNARE LARIAT HEXAGONAL 10X28

## (undated) DEVICE — FORCEP RADIAL JAW 4

## (undated) DEVICE — SNARE 9MM 230CM 2.4MM EXACTO

## (undated) DEVICE — HYBRID TUBING/CAP SET FOR OLYMPUS® SCOPES: Brand: ERBE

## (undated) DEVICE — MASK PROC W/VISOR ANTIGLARE

## (undated) DEVICE — MEDI-VAC NON-CONDUCTIVE SUCTION TUBING 6MM X 1.8M (6FT.) L: Brand: CARDINAL HEALTH

## (undated) DEVICE — ENDOSCOPY PACK - LOWER: Brand: MEDLINE INDUSTRIES, INC.

## (undated) DEVICE — NEEDLE CONTRAST INTERJECT 25G

## (undated) DEVICE — DISSECTOR SONICISION CORDLESS

## (undated) DEVICE — EVACUATOR URO RELIVAC 100CC

## (undated) DEVICE — TROCAR: Brand: KII® SLEEVE

## (undated) DEVICE — DRAPE SHEET LG

## (undated) NOTE — LETTER
8/27/2021    [facility] ([place], [place]) [address]            Dear Lora Tijerina,      Our records indicate that you are due for an appointment for a Colonoscopy in September 2021, or shortly there after, with S.  D

## (undated) NOTE — MR AVS SNAPSHOT
After Visit Summary   11/8/2018    Parul Gutierres    MRN: [id number]           Visit Information     Date & Time  11/8/2018  9:30 AM Provider  Roula Thompson MD [address] [place] [address], [address] [place] [address] Dept.  Phone  [phone] Fully enjoy your food when eating. Don’t eat while distracted and slow down. Avoid over sized portions. Don’t eat while when you’re bored.      EAT THESE FOODS MORE OFTEN: EAT THESE FOODS LESS OFTEN:   Make half your plate fruits and vegetables Highly DO YOU KNOW WHERE TO GO? Injury & illness are never convenient. If you are dealing with a   non-emergency, consider your options before heading to an ER.          SAME DAY  APPOINTMENTS  Available at primary care offices      AFTER HOURS CARE  Ashley Hanley

## (undated) NOTE — LETTER
No referring provider defined for this encounter. 01/09/23        Patient: Quan Hurt   YOB: 1955   Date of Visit: 1/9/2023       Dear  Dr. Natanael Fernandes,      Thank you for referring Quan Hurt to my practice. Please find my assessment and plan below. Clinically he reports he is doing much better on sinemet; also helped his restless leg. He just took his dose of sinement before being seen. His exam is about the same compared to [facility]. He is in [address] and [address] boxing. Given how long he is awake will increase dose to qid 5 hours apart.                Sincerely,   Ashley Ding DO   [address]  [address] [address]    Document electronically generated by:  Ashley Ding DO

## (undated) NOTE — LETTER
10/2/2018              [facility]        [address] [place] [address]         Dear Dearl Jerald,    I reviewed the pathology report from the biopsies done during your recent upper endoscopy.  Based on the report, you have NO evidence o

## (undated) NOTE — LETTER
MEDICAL GRADE COMPRESSION VENDORS:  The list below are different vendors who have compression stockings available  Please call and make an appointment  Some of the following fitted clinics accept insurance or Self Pay:                 Patients with HMO insurance needs one primary physician referral      [place] [facility]                                          5.   [facility]                                       SELF PAY = $106-$170                                                                       SELF PAY (price varies)                        [facility]                                                                                 [address]                        [place], [place]         [facility]                                                                                             Phone # [phone] [address]                                                          Fax # [phone]                        [place], [place]                                                                                               Phone # [phone]                                                                                 Fax # [phone]              [employer] [employer]                                                  6.       [facility]   SELF PAY = $85-$150                                                                          SELF PAY (price varies)                        Some Insurance Plans over $200                                                 [address]                                                               Tel# [phone]                         Phone # [phone]                                                                     Fax # [phone]                         [address]                                                                                                [place], [place]                                                                                   [employer]                         Phone # [phone]                                                                     [place]                         Fax#[phone]                                                                              Health Choice                                                                                                                                                                                                                                                                                     Medicare A-D    [facility]                                               7.  [facility]        SELF PAY = $32.99-$120                                           SELF PAY = [address]        No insurance plans accepted                                                        [place] [address]        [address]                                                                                  Phone # [phone]        [place], [place]                                                                                Fax # [phone]        Phone # [phone]; Fax # [phone]         Technician: [facility]        SELF PAY = $50-$156        No insurance plans accepted        No Custom compressions        2 pairs every 6 months (Lymphedema)                       [address] [place], [place]         Phone # [phone] Ext # 5; Fax # [phone]        Technician: Arlette

## (undated) NOTE — MR AVS SNAPSHOT
[facility]  [address], [address]  [phone]               Thank you for choosing us for your health care visit with Lucero Hooker MD.  We are glad to serve you and happy to provide you with this summary schedule your appointment. Failure to obtain required authorization numbers can create reimbursement difficulties for you. Assoc Dx:   Other pulmonary embolism without acute cor pulmonale, unspecified chronicity ([facility] [I26.99]          Reason for Today's Commonly known as: Toprol XL           PSEUDOEPHEDRINE HCL OR   Take  by mouth. take 2 tablet (60MG)  by oral route  every 6 hours as needed           tamsulosin HCl 0.4 MG Caps   Take by mouth daily.    Commonly known as:  FLOMAX           Typhoid Vaccine

## (undated) NOTE — LETTER
No referring provider defined for this encounter. 12/06/22        Patient: Jasmine Tariq   YOB: 1955   Date of Visit: 12/6/2022       Dear  Dr. Sana Muñoz,      Thank you for referring Jasmine Tariq to my practice. Please find my assessment and plan below. he has sx of parkinson disease. Will add sinement, refer to PT, and will go up on his gabapentin. He may not need propranolol.                Sincerely,   Jackelin Beltran DO   [address],  [id number] [place] [id number]    Document electronically generated by:  Jackelin Beltran DO

## (undated) NOTE — MR AVS SNAPSHOT
[facility]  [address] [place] [address] [phone]               Thank you for choosing us for your health care visit with Malia Cummings. Don Miller MD.  We are glad to serve you and happy to provide you with this summary of your visit. Fluticasone Propionate 50 MCG/ACT Susp   USE 1 SPRAY IN EACH NOSTRIL EVERY DAY   Commonly known as:  FLONASE           hydrochlorothiazide 25 MG Tabs   Take 1 tablet (25 mg total) by mouth daily.    Commonly known as:  HYDRODIURIL           MetFORMIN HCl 5

## (undated) NOTE — MR AVS SNAPSHOT
[facility]  [address], [address] [place] [address]  [phone]               Thank you for choosing us for your health care visit with Nurse.   We are glad to serve you and happy to provide you with this summary of your visit Commonly known as:  GLUCOPHAGE           Metoprolol Succinate ER 50 MG Tb24   Take 1 tablet (50 mg total) by mouth once daily. Commonly known as: Toprol XL           PSEUDOEPHEDRINE HCL OR   Take  by mouth.  take 2 tablet (60MG)  by oral route  every 6 h

## (undated) NOTE — LETTER
[address], [place]  Authorization for Invasive Procedures  1. I hereby authorize Dr. Shilpa Garcia , my physician and whomever may be designated as the doctor's assistant, to perform the following operation and/or procedure:  COLONOSCOPY on Adonis Elizondo at [facility].    2. My physician has explained to me the nature and purpose of the operation or other procedure, possible alternative methods of treatment, the risks involved and the possibility of complications to me. I understand the probable consequences of declining the recommended procedure and the alternative methods of treatment. I acknowledge that no guarantee has been made as to the result that may be obtained. 3. I recognize that during the course of this operation or other procedure, unforeseen conditions may necessitate additional or different procedures than those listed above. I, therefore, further authorize and request that the above-named physician, his/her physician assistants, or designees perform such procedures as are, in his/her professional opinion, necessary and desirable. If I have a Do Not Attempt Resuscitation (DNAR) order in place, that status will be suspended while in the operating room, procedural suite, and during the recovery period unless otherwise explicitly stated by me (or a person authorized to consent on my behalf). The surgeon or my attending physician will determine when the applicable recovery period ends for purposes of reinstating the DNAR order. 4. Should the need arise during my operation or immediate post-operative period; I also consent to the administration of blood and/or blood products.  Further, I understand that despite careful testing and screening of blood and blood products, I may still be subject to ill effects as a result of recieving a blood transfusion an/or blood producst. The following are some, but not all, of the potential risks that can occur: fever and allergic reactions, hemolytic reactions, transmission of disease such as hepatitis, AIDS, cytomegalovirus (CMV), and flluid overload. In the event that I wish to have autologous transfusions of my own blood, or a directed donor transfusion, I will discuss this with my physician. 5. I consent to the photographing of the operations or procedures to be performed for the purposes of advancing medicine, science, and/or education, provided my identity is not revealed. If the procedure has been videotaped, the physician/surgeon will obtain the original videotape. The hospital will not be responsible for storage or maintenance of this tape. 6. I consent to the presence of a  or observer as deemed necessary by my physician or his designee. 7. Any tissues or organs removed in the operation or other procedure may be disposed of by and at the discretion of [facility].    8. I understand that the physician and his/her physician assistants may not be employees or agents of [facility], [facility], [facility] [address] [facility], but are independent medical practitioners who have been permitted to use its facilities for the care and treatment of their patients. 9. Patients having a sterilization procedure: I understand that if the procedure is successful the results will be permanent and it will therefore be impossible for me to inseminate, conceive or bear children. I also understand that the procedure is intended to result in sterility, although the result has not been guaranteed. 10. I CERTIFY THAT I HAVE READ AND FULLY UNDERSTAND THE ABOVE CONSENT TO OPERATION and/or OTHER PROCEDURE. 11. I acknowledge that my physician has explained sedation/analgesia administration to me including the risks and benefits. I consent to the administration of sedation/analgesia as may be necessary or desirable in the judgment of my physician.      Signature of Patient:  ________________________________________________ Date: _________Time: _________    Responsible person in case of minor or unconscious: _____________________________Relationship: ____________     Witness Signature: ____________________________________________ Date: __________ Time: ___________    Statement of Physician  My signature below affirms that prior to the time of the procedure, I have explained to the patient and/or his legal representative, the risks and benefits involved in the proposed treatment and any reasonable alternative to the proposed treatment. I have also explained the risks and benefits involved in the refusal of the proposed treatment and have answered the patient's questions. If I have a significant financial interest in this procedure/surgery, I have disclosed this and had a discussion with my patient.     Signature of Physician:   ________________________________________Date: _________Time:_______ Patient Name: Miguel A Duran  : 1955   Printed: 2022    Medical Record #: [id number]

## (undated) NOTE — LETTER
Medical Grade Compression Hose        Patient: Wolf Crabtree      YOB: 1955           Diagnosis: Varicose Veins with Pain- Bilateral: I83.813       Compression: 20-30mmHg- Moderate  Style: Thigh-High        Amount: X 2  HCPS: - Thigh High          Physician Signature: _____________________  Date:  03/26/24                                         Hayden Castellanos APN

## (undated) NOTE — LETTER
[facility]  Administration of Anesthesia  1. I, John Loera, or _________________________________ acting on his behalf, (Patient) (Dependent/Representative) request to receive anesthesia for my pending procedure/operation/treatment. A physician (anesthesiologist) alone or an anesthesiologist working with a nurse anesthetist may administer my anesthesia. 2. I understand that my anesthesiologist is not an employee or agent of the hospital, but is an independent medical practitioner who has been permitted to use its facilities for the care and treatment of his/her patients. 3. I acknowledge that a physician from [facility] or their designate(s), recommended anesthesia for me using her/his medical judgment. The type(s) of anesthesia I may receive include:                a) General Anesthesia, b) Spinal/Epidural Anesthesia, c) Regional Anesthesia or d) Monitored Anesthesia Care. 4. If my spinal, regional or monitored anesthesia care (local) is not satisfactory for my comfort, or if my medical condition requires, I consent to the administration of general anesthesia. 5. I am aware that the practice of anesthesiology is not an exact science and that some foreseeable risks or consequences may occur. Some common risks/consequences include sore throat and hoarseness, nausea and vomiting, muscle soreness, backache, damage to the mouth/teeth/vocal cords and eye injury. I understand that more rare but serious potential risks of anesthesia include blood pressure changes, drug reactions, cardiac arrest, brain damage, paralysis or death. These risks apply to whether I have general, spinal/epidural, regional or monitored anesthesia care. 6. OBSTETRIC PATIENTS: Specific risks/consequences of spinal/epidural anesthesia may include itching, low blood pressure, difficulty urinating, slowing of the baby's heart rate and headache.  Rare risks include infections, high spinal block, spinal bleeding, seizure, cardiac arrest and death. 7. AWARENESS: I understand that it is possible (but unlikely) to have explicit memory of events from the operating room while under general anesthesia. 8. ELECTROCONVULSIVE THERAPY PATIENTS: This consent serves for all treatments in a single course of therapy. 9. I understand that I must inform my anesthesiologist when I last ate and/or drank to minimize the risk of anesthesia. 10. If I am pregnant, or may pregnant, I understand that elective surgery should be postponed until after the baby is born. Anesthetics cross the placenta and may temporarily anesthetize the baby. Although fetal complications of anesthesia during pregnancy are rare, they may include birth defects, premature labor, brain damage and death. 11. I certify that I informed the anesthesiologist, to the best of my ability, about medication I take including blood thinners, anticoagulants, herbal remedies, narcotics and recreational drugs (e.g. cocaine, marijuana, PCP). Failure to inform my anesthesiologist about these medicines may increase my risk of anesthetic complications. The nature and purpose of my anesthetic management was explained to me. I had the opportunity to ask questions and the answers and information provided meet my satisfaction.   I retain the right to withdraw this consent at any time prior to the administration of said anesthetic.    ___________________________________________________           _____________________________________________________  Patient Signature                                                                                      Witness Signature                ___________________________________________________           _____________________________________________________  Date/Time                                                                                               Responsible person in case of minor/ unconscious pt /Relationship    My signature below affirms that prior to the time of the procedure, I have explained to the patient and/or his/her guardian, the risks and benefits of undergoing anesthesia, as well as any reasonable alternatives.     ___________________________________________________            _____________________________________________________  Physician Signature                            Date/Time  Patient Name: Ashley Hurt     : 1955     Printed: 2022      Medical Record #: [id number]                              Page 1 of 1    ----------ANESTHESIA CONSENT----------

## (undated) NOTE — LETTER
3/14/2025      Medical Grade Compression Hose        Patient: Wolf Crabtree      YOB: 1955           Diagnosis: Symptomatic Venous Insufficiency: I87.2       Compression: 20-30mmHg- Moderate  Style: Knee-High        Amount: X 2  HCPS: - Knee High          Physician Signature: _____________________  Date:  03/14/25       Sincerely,        Emanuel Blake, APRN